# Patient Record
Sex: FEMALE | Race: BLACK OR AFRICAN AMERICAN | Employment: FULL TIME | URBAN - METROPOLITAN AREA
[De-identification: names, ages, dates, MRNs, and addresses within clinical notes are randomized per-mention and may not be internally consistent; named-entity substitution may affect disease eponyms.]

---

## 2018-12-11 ENCOUNTER — OFFICE VISIT (OUTPATIENT)
Dept: FAMILY MEDICINE CLINIC | Age: 53
End: 2018-12-11
Payer: COMMERCIAL

## 2018-12-11 VITALS
DIASTOLIC BLOOD PRESSURE: 82 MMHG | HEIGHT: 59 IN | SYSTOLIC BLOOD PRESSURE: 122 MMHG | HEART RATE: 87 BPM | RESPIRATION RATE: 16 BRPM | OXYGEN SATURATION: 98 % | TEMPERATURE: 98.1 F | BODY MASS INDEX: 43.85 KG/M2 | WEIGHT: 217.5 LBS

## 2018-12-11 DIAGNOSIS — Z23 NEED FOR TETANUS BOOSTER: ICD-10-CM

## 2018-12-11 DIAGNOSIS — Z23 NEED FOR INFLUENZA VACCINATION: ICD-10-CM

## 2018-12-11 DIAGNOSIS — Z00.00 WELL ADULT EXAM: Primary | ICD-10-CM

## 2018-12-11 DIAGNOSIS — Z12.39 SCREENING FOR BREAST CANCER: ICD-10-CM

## 2018-12-11 DIAGNOSIS — Z12.11 SCREEN FOR COLON CANCER: ICD-10-CM

## 2018-12-11 PROCEDURE — 90686 IIV4 VACC NO PRSV 0.5 ML IM: CPT | Performed by: FAMILY MEDICINE

## 2018-12-11 PROCEDURE — 90472 IMMUNIZATION ADMIN EACH ADD: CPT | Performed by: FAMILY MEDICINE

## 2018-12-11 PROCEDURE — 90471 IMMUNIZATION ADMIN: CPT | Performed by: FAMILY MEDICINE

## 2018-12-11 PROCEDURE — 99386 PREV VISIT NEW AGE 40-64: CPT | Performed by: FAMILY MEDICINE

## 2018-12-11 PROCEDURE — 90715 TDAP VACCINE 7 YRS/> IM: CPT | Performed by: FAMILY MEDICINE

## 2018-12-11 ASSESSMENT — PATIENT HEALTH QUESTIONNAIRE - PHQ9
2. FEELING DOWN, DEPRESSED OR HOPELESS: 0
SUM OF ALL RESPONSES TO PHQ QUESTIONS 1-9: 0
SUM OF ALL RESPONSES TO PHQ QUESTIONS 1-9: 0
1. LITTLE INTEREST OR PLEASURE IN DOING THINGS: 0
SUM OF ALL RESPONSES TO PHQ QUESTIONS 1-9: 0
SUM OF ALL RESPONSES TO PHQ9 QUESTIONS 1 & 2: 0
SUM OF ALL RESPONSES TO PHQ QUESTIONS 1-9: 0
2. FEELING DOWN, DEPRESSED OR HOPELESS: 0
1. LITTLE INTEREST OR PLEASURE IN DOING THINGS: 0
SUM OF ALL RESPONSES TO PHQ9 QUESTIONS 1 & 2: 0

## 2018-12-11 ASSESSMENT — ENCOUNTER SYMPTOMS
EYE ITCHING: 0
WHEEZING: 0
EYE REDNESS: 0
TROUBLE SWALLOWING: 0
NAUSEA: 0
CHEST TIGHTNESS: 0
VOMITING: 0
RHINORRHEA: 0
SHORTNESS OF BREATH: 0
ABDOMINAL PAIN: 0

## 2018-12-11 NOTE — PROGRESS NOTES
98.1 °F (36.7 °C), temperature source Tympanic, resp. rate 16, height 4' 11\" (1.499 m), weight 217 lb 8 oz (98.7 kg), last menstrual period 11/20/2018, SpO2 98 %, not currently breastfeeding. Physical Exam   Constitutional: She is oriented to person, place, and time. She appears well-developed and well-nourished. No distress. HENT:   Head: Normocephalic and atraumatic. Right Ear: External ear normal.   Left Ear: External ear normal.   Nose: Nose normal.   Mouth/Throat: Oropharynx is clear and moist. No oropharyngeal exudate. Eyes: Pupils are equal, round, and reactive to light. Conjunctivae and EOM are normal. Right eye exhibits no discharge. Left eye exhibits no discharge. No scleral icterus. Neck: Normal range of motion. Neck supple. No JVD present. No tracheal deviation present. No thyromegaly present. Cardiovascular: Normal rate, regular rhythm, normal heart sounds and intact distal pulses. Exam reveals no gallop and no friction rub. No murmur heard. Pulmonary/Chest: Effort normal and breath sounds normal. No respiratory distress. She has no wheezes. She has no rales. She exhibits no tenderness. Abdominal: Soft. Bowel sounds are normal. She exhibits no distension and no mass. There is no tenderness. There is no rebound and no guarding. Musculoskeletal: Normal range of motion. She exhibits no edema or tenderness. Lymphadenopathy:     She has no cervical adenopathy. Neurological: She is alert and oriented to person, place, and time. She has normal reflexes. No cranial nerve deficit. She exhibits normal muscle tone. Coordination normal.   Skin: Skin is warm. No rash noted. She is not diaphoretic. No erythema. No pallor. Psychiatric: She has a normal mood and affect. Her behavior is normal. Judgment and thought content normal.   Nursing note and vitals reviewed. Assessment:       Diagnosis Orders   1.  Well adult exam  Comprehensive Metabolic Panel    Lipid Panel    CBC Auto

## 2019-01-11 DIAGNOSIS — Z00.00 WELL ADULT EXAM: ICD-10-CM

## 2019-01-11 DIAGNOSIS — Z00.00 WELL ADULT EXAM: Primary | ICD-10-CM

## 2019-01-11 LAB
A/G RATIO: 1.2 (ref 1.1–2.2)
ALBUMIN SERPL-MCNC: 4.1 G/DL (ref 3.4–5)
ALP BLD-CCNC: 94 U/L (ref 40–129)
ALT SERPL-CCNC: 10 U/L (ref 10–40)
ANION GAP SERPL CALCULATED.3IONS-SCNC: 15 MMOL/L (ref 3–16)
AST SERPL-CCNC: 11 U/L (ref 15–37)
BASOPHILS ABSOLUTE: 0.1 K/UL (ref 0–0.2)
BASOPHILS RELATIVE PERCENT: 0.8 %
BILIRUB SERPL-MCNC: 0.3 MG/DL (ref 0–1)
BUN BLDV-MCNC: 10 MG/DL (ref 7–20)
CALCIUM SERPL-MCNC: 9 MG/DL (ref 8.3–10.6)
CHLORIDE BLD-SCNC: 102 MMOL/L (ref 99–110)
CHOLESTEROL, TOTAL: 158 MG/DL (ref 0–199)
CO2: 22 MMOL/L (ref 21–32)
CREAT SERPL-MCNC: <0.5 MG/DL (ref 0.6–1.1)
EOSINOPHILS ABSOLUTE: 0.1 K/UL (ref 0–0.6)
EOSINOPHILS RELATIVE PERCENT: 1.3 %
GFR AFRICAN AMERICAN: >60
GFR NON-AFRICAN AMERICAN: >60
GLOBULIN: 3.4 G/DL
GLUCOSE BLD-MCNC: 137 MG/DL (ref 70–99)
HCT VFR BLD CALC: 37.6 % (ref 36–48)
HDLC SERPL-MCNC: 53 MG/DL (ref 40–60)
HEMOGLOBIN: 11.8 G/DL (ref 12–16)
LDL CHOLESTEROL CALCULATED: 89 MG/DL
LYMPHOCYTES ABSOLUTE: 2.3 K/UL (ref 1–5.1)
LYMPHOCYTES RELATIVE PERCENT: 20.2 %
MCH RBC QN AUTO: 23.4 PG (ref 26–34)
MCHC RBC AUTO-ENTMCNC: 31.4 G/DL (ref 31–36)
MCV RBC AUTO: 74.6 FL (ref 80–100)
MONOCYTES ABSOLUTE: 0.4 K/UL (ref 0–1.3)
MONOCYTES RELATIVE PERCENT: 4 %
NEUTROPHILS ABSOLUTE: 8.3 K/UL (ref 1.7–7.7)
NEUTROPHILS RELATIVE PERCENT: 73.7 %
PDW BLD-RTO: 19.4 % (ref 12.4–15.4)
PLATELET # BLD: 285 K/UL (ref 135–450)
PMV BLD AUTO: 8.4 FL (ref 5–10.5)
POTASSIUM SERPL-SCNC: 4.3 MMOL/L (ref 3.5–5.1)
RBC # BLD: 5.05 M/UL (ref 4–5.2)
SODIUM BLD-SCNC: 139 MMOL/L (ref 136–145)
TOTAL PROTEIN: 7.5 G/DL (ref 6.4–8.2)
TRIGL SERPL-MCNC: 82 MG/DL (ref 0–150)
TSH SERPL DL<=0.05 MIU/L-ACNC: 1.41 UIU/ML (ref 0.27–4.2)
VLDLC SERPL CALC-MCNC: 16 MG/DL
WBC # BLD: 11.2 K/UL (ref 4–11)

## 2019-01-16 DIAGNOSIS — R73.09 ELEVATED GLUCOSE LEVEL: Primary | ICD-10-CM

## 2019-01-16 LAB — MISCELLANEOUS LAB TEST ORDER: NORMAL

## 2019-01-28 ENCOUNTER — OFFICE VISIT (OUTPATIENT)
Dept: FAMILY MEDICINE CLINIC | Age: 54
End: 2019-01-28
Payer: COMMERCIAL

## 2019-01-28 VITALS
RESPIRATION RATE: 16 BRPM | TEMPERATURE: 98.1 F | BODY MASS INDEX: 45.18 KG/M2 | SYSTOLIC BLOOD PRESSURE: 122 MMHG | OXYGEN SATURATION: 98 % | HEIGHT: 59 IN | WEIGHT: 224.1 LBS | DIASTOLIC BLOOD PRESSURE: 80 MMHG | HEART RATE: 86 BPM

## 2019-01-28 DIAGNOSIS — D58.2 HIGH BLOOD HEMOGLOBIN F (HCC): ICD-10-CM

## 2019-01-28 DIAGNOSIS — R73.9 HYPERGLYCEMIA: Primary | ICD-10-CM

## 2019-01-28 PROCEDURE — 99214 OFFICE O/P EST MOD 30 MIN: CPT | Performed by: FAMILY MEDICINE

## 2019-01-29 PROBLEM — D58.2 HIGH BLOOD HEMOGLOBIN F (HCC): Status: ACTIVE | Noted: 2019-01-29

## 2019-01-29 PROBLEM — R73.9 HYPERGLYCEMIA: Status: ACTIVE | Noted: 2019-01-29

## 2019-01-29 ASSESSMENT — ENCOUNTER SYMPTOMS
PHOTOPHOBIA: 0
SHORTNESS OF BREATH: 0
CHEST TIGHTNESS: 0
COLOR CHANGE: 0

## 2019-02-26 ENCOUNTER — OFFICE VISIT (OUTPATIENT)
Dept: FAMILY MEDICINE CLINIC | Age: 54
End: 2019-02-26
Payer: COMMERCIAL

## 2019-02-26 VITALS
TEMPERATURE: 98 F | OXYGEN SATURATION: 98 % | BODY MASS INDEX: 43.42 KG/M2 | WEIGHT: 215 LBS | HEART RATE: 92 BPM | DIASTOLIC BLOOD PRESSURE: 72 MMHG | SYSTOLIC BLOOD PRESSURE: 128 MMHG

## 2019-02-26 DIAGNOSIS — Z12.11 SCREEN FOR COLON CANCER: ICD-10-CM

## 2019-02-26 DIAGNOSIS — S16.1XXA STRAIN OF NECK MUSCLE, INITIAL ENCOUNTER: ICD-10-CM

## 2019-02-26 DIAGNOSIS — R05.9 COUGH: ICD-10-CM

## 2019-02-26 DIAGNOSIS — Z12.31 VISIT FOR SCREENING MAMMOGRAM: ICD-10-CM

## 2019-02-26 DIAGNOSIS — M54.6 ACUTE BILATERAL THORACIC BACK PAIN: Primary | ICD-10-CM

## 2019-02-26 DIAGNOSIS — R09.81 SINUS CONGESTION: ICD-10-CM

## 2019-02-26 PROCEDURE — 99214 OFFICE O/P EST MOD 30 MIN: CPT | Performed by: FAMILY MEDICINE

## 2019-02-26 RX ORDER — CYCLOBENZAPRINE HCL 10 MG
10 TABLET ORAL
COMMUNITY
Start: 2019-02-15 | End: 2020-06-02

## 2019-02-26 RX ORDER — FLUTICASONE PROPIONATE 50 MCG
2 SPRAY, SUSPENSION (ML) NASAL DAILY
Qty: 1 BOTTLE | Refills: 3 | Status: SHIPPED | OUTPATIENT
Start: 2019-02-26 | End: 2022-08-22

## 2019-02-26 ASSESSMENT — ENCOUNTER SYMPTOMS
HEARTBURN: 1
BOWEL INCONTINENCE: 0
SHORTNESS OF BREATH: 0
BACK PAIN: 1
ABDOMINAL PAIN: 0
WHEEZING: 0
COUGH: 1

## 2019-03-04 DIAGNOSIS — Z12.11 SCREENING FOR COLON CANCER: Primary | ICD-10-CM

## 2019-03-04 LAB
CONTROL: NORMAL
HEMOCCULT STL QL: POSITIVE

## 2019-03-04 PROCEDURE — 82274 ASSAY TEST FOR BLOOD FECAL: CPT | Performed by: FAMILY MEDICINE

## 2019-11-19 ENCOUNTER — OFFICE VISIT (OUTPATIENT)
Dept: FAMILY MEDICINE CLINIC | Age: 54
End: 2019-11-19

## 2019-11-19 VITALS
HEART RATE: 78 BPM | HEIGHT: 59 IN | RESPIRATION RATE: 16 BRPM | OXYGEN SATURATION: 98 % | BODY MASS INDEX: 42.58 KG/M2 | WEIGHT: 211.2 LBS | SYSTOLIC BLOOD PRESSURE: 118 MMHG | DIASTOLIC BLOOD PRESSURE: 72 MMHG | TEMPERATURE: 97.1 F

## 2019-11-19 DIAGNOSIS — Z23 NEED FOR INFLUENZA VACCINATION: ICD-10-CM

## 2019-11-19 DIAGNOSIS — M25.562 CHRONIC PAIN OF LEFT KNEE: Primary | ICD-10-CM

## 2019-11-19 DIAGNOSIS — G89.29 CHRONIC PAIN OF LEFT KNEE: Primary | ICD-10-CM

## 2019-11-19 PROCEDURE — 90686 IIV4 VACC NO PRSV 0.5 ML IM: CPT | Performed by: FAMILY MEDICINE

## 2019-11-19 PROCEDURE — 90471 IMMUNIZATION ADMIN: CPT | Performed by: FAMILY MEDICINE

## 2019-11-19 PROCEDURE — 99214 OFFICE O/P EST MOD 30 MIN: CPT | Performed by: FAMILY MEDICINE

## 2019-11-19 ASSESSMENT — PATIENT HEALTH QUESTIONNAIRE - PHQ9
SUM OF ALL RESPONSES TO PHQ QUESTIONS 1-9: 0
1. LITTLE INTEREST OR PLEASURE IN DOING THINGS: 0
SUM OF ALL RESPONSES TO PHQ QUESTIONS 1-9: 0
SUM OF ALL RESPONSES TO PHQ9 QUESTIONS 1 & 2: 0
2. FEELING DOWN, DEPRESSED OR HOPELESS: 0

## 2019-11-19 ASSESSMENT — ENCOUNTER SYMPTOMS: COLOR CHANGE: 0

## 2019-11-22 DIAGNOSIS — M25.562 CHRONIC PAIN OF LEFT KNEE: Primary | ICD-10-CM

## 2019-11-22 DIAGNOSIS — G89.29 CHRONIC PAIN OF LEFT KNEE: Primary | ICD-10-CM

## 2020-02-11 ENCOUNTER — OFFICE VISIT (OUTPATIENT)
Dept: ORTHOPEDIC SURGERY | Age: 55
End: 2020-02-11
Payer: COMMERCIAL

## 2020-02-11 VITALS
HEIGHT: 59 IN | SYSTOLIC BLOOD PRESSURE: 121 MMHG | HEART RATE: 74 BPM | BODY MASS INDEX: 42.94 KG/M2 | WEIGHT: 213 LBS | DIASTOLIC BLOOD PRESSURE: 60 MMHG

## 2020-02-11 PROCEDURE — 99204 OFFICE O/P NEW MOD 45 MIN: CPT | Performed by: PHYSICIAN ASSISTANT

## 2020-02-11 NOTE — PROGRESS NOTES
Patient Name: León Rene  : 1965  DOS: 2020        Chief Complaint:   Chief Complaint   Patient presents with    Knee Pain     Left knee       History of Present Illness:  León Rene is a 49 y.o. female who presents with a chief complaint of continued complaints of pain in the knee with irritation with walking, stairs and with overuse. Pain in the knee regularly with swelling and discomfort. Increase in pain over the past several years time. Patient notes she has been dealing with left knee pain over the past few years notes that she has been seeing a orthopedic at [facility] who is been giving her cortisone injections as well as Visco supplementation injections. She notes that the knee swells up approximately 3 to 4 weeks after receiving a corticosteroid injection and becomes very painful and will lock up causing her and inability to move his knee. She notes that she did not have this issue with the viscosupplementation injections when she did have them and they provided her 6 months worth of relief in regards to her pain. She notes she utilizes a compression brace to help with her knee on a daily basis and notes that she has limited to no issues when she utilizes her brace and when the knot/swelling is not there. However when the knot and swelling is there or does not use her knee brace the knee pain can get upwards of 10 out of 10 and has sent her to the emergency room a couple of times. If it injury fall trauma. Denies utilization of cane crutch or walker. Denies numbness burning tingling radiating pain down the leg. Does note on a day-to-day basis if she does not utilize her brace she will get a lot of pain especially with going up and down stairs as well as long periods of standing walking. Notes she is currently utilizing combination of Tylenol and ibuprofen to help manage her pain she was weaned off of pain medication which did provide some relief.           Medical History  Current Medications:   Prior to Admission medications    Medication Sig Start Date End Date Taking? Authorizing Provider   cyclobenzaprine (FLEXERIL) 10 MG tablet Take 10 mg by mouth 2/15/19   Historical Provider, MD   fluticasone [facility]) 50 MCG/ACT nasal spray 2 sprays by Nasal route daily 2/26/19 2/26/20  Carlitos Montez MD     Allergies: No Known Allergies    Review of Systems:     Review of Systems ______  Constitutional: Negative for fever and diaphoresis. ____________  Respiratory: Negative for shortness of breath.  ________  Gastrointestinal: Negative for abdominal pain. ________  Musculoskeletal: Positive for joint pain. ____  Skin: Negative for itching. ____  Neurological: Negative for loss of consciousness. ______________  All other systems reviewed and negative     No past medical history on file.   Family History   Problem Relation Age of Onset    Kidney Disease Mother     Heart Attack Father      Social History     Socioeconomic History    Marital status: Single     Spouse name: Not on file    Number of children: Not on file    Years of education: Not on file    Highest education level: Not on file   Occupational History    Not on file   Social Needs    Financial resource strain: Not on file    Food insecurity:     Worry: Not on file     Inability: Not on file    Transportation needs:     Medical: Not on file     Non-medical: Not on file   Tobacco Use    Smoking status: Never Smoker    Smokeless tobacco: Never Used   Substance and Sexual Activity    Alcohol use: No    Drug use: No    Sexual activity: Not on file   Lifestyle    Physical activity:     Days per week: Not on file     Minutes per session: Not on file    Stress: Not on file   Relationships    Social connections:     Talks on phone: Not on file     Gets together: Not on file     Attends [employer] service: Not on file     Active member of club or organization: Not on file     Attends meetings of clubs or organizations: Not on file     Relationship status: Not on file    Intimate partner violence:     Fear of current or ex partner: Not on file     Emotionally abused: Not on file     Physically abused: Not on file     Forced sexual activity: Not on file   Other Topics Concern    Not on file   Social History Narrative    Not on file         Physical Exam __  Constitutional: She is oriented to person, place, and time and well-developed, well-nourished, and in no distress. No distress. ____  HENT:   Head: Normocephalic and atraumatic. ____  Eyes: Conjunctivae are normal. ________  Cardiovascular: Intact distal pulses. ____  Pulmonary/Chest: Effort normal. ________________________  Neurological: She is alert and oriented to person, place, and time. ____  Skin: Skin is dry. She is not diaphoretic. ____  Psychiatric: Mood, affect and judgment normal. ______          Assessment   Vital Signs:      Vitals:    02/11/20 1338   BP: 121/60   Pulse: 74       left Knee shows minimal evidence for DJD with patellar obvious pseudolaxity, pain with weight bearing, antalgic gait and minimal palpable osteophytes. Inspection: Moderate anterior swelling. Swelling is present with mild effusion. The posterior aspect of the knee appears to be full with tenderness. There is no erythema, rash, or ecchymosis. Range of Motion:  Right 0 to 120 degrees left 0 to 120 degrees    No pain with varus or valgus testing    There is no noted deformity    Strength:  Hamstrings rated: 4/5. Quadriceps rated: 5/5    Palpation: There is moderate tenderness along the patellar greater lateral than medial joint line. Special Tests: Patellar Compression test is positive. Valgus & Varus test is positive. Skin: There are no rashes, ulcerations or lesions. Gait: Gait pattern is antalgic  Skin shows no rashes/ecchymosis to the affected area, no hyperesthesias, no discoloration, no temperature or color discrepancies.    NEUROLOGICALLY: There is no

## 2020-03-16 ENCOUNTER — HOSPITAL ENCOUNTER (OUTPATIENT)
Dept: MRI IMAGING | Age: 55
Discharge: HOME | End: 2020-03-16
Payer: COMMERCIAL

## 2020-03-16 PROCEDURE — 73721 MRI JNT OF LWR EXTRE W/O DYE: CPT

## 2020-06-02 ENCOUNTER — OFFICE VISIT (OUTPATIENT)
Dept: ORTHOPEDIC SURGERY | Age: 55
End: 2020-06-02
Payer: COMMERCIAL

## 2020-06-02 VITALS
DIASTOLIC BLOOD PRESSURE: 78 MMHG | BODY MASS INDEX: 45.95 KG/M2 | HEIGHT: 57 IN | TEMPERATURE: 98.1 F | WEIGHT: 212.96 LBS | HEART RATE: 82 BPM | SYSTOLIC BLOOD PRESSURE: 124 MMHG

## 2020-06-02 PROBLEM — G89.29 CHRONIC PAIN OF LEFT KNEE: Status: ACTIVE | Noted: 2020-06-02

## 2020-06-02 PROBLEM — M17.12 PRIMARY OSTEOARTHRITIS OF LEFT KNEE: Status: ACTIVE | Noted: 2020-06-02

## 2020-06-02 PROBLEM — M25.562 CHRONIC PAIN OF LEFT KNEE: Status: ACTIVE | Noted: 2020-06-02

## 2020-06-02 PROCEDURE — 99203 OFFICE O/P NEW LOW 30 MIN: CPT | Performed by: ORTHOPAEDIC SURGERY

## 2020-06-02 NOTE — PROGRESS NOTES
Heart Attack Father      Social History     Socioeconomic History    Marital status: Single     Spouse name: Not on file    Number of children: Not on file    Years of education: Not on file    Highest education level: Not on file   Occupational History    Not on file   Social Needs    Financial resource strain: Not on file    Food insecurity     Worry: Not on file     Inability: Not on file    Transportation needs     Medical: Not on file     Non-medical: Not on file   Tobacco Use    Smoking status: Never Smoker    Smokeless tobacco: Never Used   Substance and Sexual Activity    Alcohol use: No    Drug use: No    Sexual activity: Not on file   Lifestyle    Physical activity     Days per week: Not on file     Minutes per session: Not on file    Stress: Not on file   Relationships    Social connections     Talks on phone: Not on file     Gets together: Not on file     Attends Pent[employer]al service: Not on file     Active member of club or organization: Not on file     Attends meetings of clubs or organizations: Not on file     Relationship status: Not on file    Intimate partner violence     Fear of current or ex partner: Not on file     Emotionally abused: Not on file     Physically abused: Not on file     Forced sexual activity: Not on file   Other Topics Concern    Not on file   Social History Narrative    Not on file         Physical Exam __  Constitutional: She is oriented to person, place, and time and well-developed, well-nourished, and in no distress. No distress. ____  HENT:   Head: Normocephalic and atraumatic. ____  Eyes: Conjunctivae are normal. ________  Cardiovascular: Intact distal pulses. ____  Pulmonary/Chest: Effort normal. ________________________  Neurological: She is alert and oriented to person, place, and time. ____  Skin: Skin is dry.  She is not diaphoretic. ____  Psychiatric: Mood, affect and judgment normal. ______          Assessment   Vital Signs:      Vitals:    06/02/20 presence of patellofemoral osteoarthritis small meniscal tear is noted in the knee without evidence of major need for surgical intervention cartilage surfaces medial and laterally appear to be grossly intact other than defects and osteophytes medially which are mild    Assessment and Plan:       Diagnosis Orders   1. Chronic pain of left knee  Ambulatory referral to Physical Therapy    EUFLEXXA INJECTION (For Auth/Precert)   2. Primary osteoarthritis of left knee  Ambulatory referral to Physical Therapy    EUFLEXXA INJECTION (For Auth/Precert)              The orders below, if any, were placed during this visit:   Orders Placed This Encounter   Procedures    Ambulatory referral to Physical Therapy     Referral Priority:   Routine     Referral Type:   Eval and Treat     Requested Specialty:   Physical Therapy     Number of Visits Requested:   1    EUFLEXXA INJECTION (For Auth/Precert)     Standing Status:   Future     Standing Expiration Date:   6/2/2021              Treatment Plan: I discussed the diagnosis of arthritis with the patient. We've discussed treatment options which would include anti-inflammatory medication, physical therapy, bracing, cortisone injections, and Visco supplementation. We have also discussed the benefits of low impact exercise and weight loss. We have also discussed the possibility of joint replacement surgery in the future. 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     Physical therapy was recommended strongly to try to obtain better quad mechanics and additionally weight loss was emphasized significantly for her and the patellofemoral issues         Nella Bernstein MD

## 2020-06-04 ENCOUNTER — TELEPHONE (OUTPATIENT)
Dept: ORTHOPEDIC SURGERY | Age: 55
End: 2020-06-04

## 2020-06-24 ENCOUNTER — TELEPHONE (OUTPATIENT)
Dept: ORTHOPEDIC SURGERY | Age: 55
End: 2020-06-24

## 2020-06-30 ENCOUNTER — TELEPHONE (OUTPATIENT)
Dept: FAMILY MEDICINE CLINIC | Age: 55
End: 2020-06-30

## 2020-06-30 NOTE — TELEPHONE ENCOUNTER
[phone] (M)  OV: 11/19/2019    Patient is experiencing menopause and has an extreme itch in her private area. The itch has become painful and the patient has scratched to the point that the area is bleeding. Patient would like a medication to be prescribed or an appt to be scheduled. Please advise.

## 2020-07-06 NOTE — TELEPHONE ENCOUNTER
Patient is calling and can not make a 1:00 today would like to know what other day she can come in  Please advise  Marjorie Jones [phone]

## 2020-07-14 ENCOUNTER — OFFICE VISIT (OUTPATIENT)
Dept: ORTHOPEDIC SURGERY | Age: 55
End: 2020-07-14
Payer: COMMERCIAL

## 2020-07-14 VITALS
BODY MASS INDEX: 45.74 KG/M2 | HEART RATE: 81 BPM | SYSTOLIC BLOOD PRESSURE: 127 MMHG | WEIGHT: 212 LBS | DIASTOLIC BLOOD PRESSURE: 76 MMHG | HEIGHT: 57 IN | TEMPERATURE: 98.6 F

## 2020-07-14 PROCEDURE — 20610 DRAIN/INJ JOINT/BURSA W/O US: CPT | Performed by: PHYSICIAN ASSISTANT

## 2020-07-14 PROCEDURE — 99213 OFFICE O/P EST LOW 20 MIN: CPT | Performed by: PHYSICIAN ASSISTANT

## 2020-07-14 NOTE — PROGRESS NOTES
Patient Name: Christal Zhou  : 1965  DOS: 2020        Chief Complaint:   Chief Complaint   Patient presents with    Knee Pain     LT Alessandrafilemon Murillo #1       History of Present Illness:  Christal Zhou is a 48 y.o. female who presents with a chief complaint of continued complaints of pain in the knee with irritation with walking, stairs and with overuse. Pain in the knee regularly with swelling and discomfort. Currently: 2020  Patient is here for follow-up regarding left knee pain and osteoarthritis. She notes she is here for the initiation of the Euflexxa series. She states she is hopeful that this will provide better relief in regards to pain versus the cortisone injections. 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 Note she is utilizing a compression brace on the knee on a regular basis as it does help with reduction in pain and some stabilization. Notes most of the pain is with transitions, stairs and standing and walking for long periods of time. Rates her overall pain is a 7 at 8 out of 10. Denies numbness burning tingling radiating pains down the leg. Denies any falls trauma or injury. Is utilizing a cane to help with ambulation. Previously: 2020  Increase in pain over the past several months time. 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 Did increase in pain with work activities. Pain and slipping regularly. Did not do well with cortisone injections. She stated she had periods of time where she had to be hospitalized after the cortisone injections the knee would lock up and she would have severe amounts of pain. No gel injections were obtained            Medical History  Current Medications:   Prior to Admission medications    Medication Sig Start Date End Date Taking?  Authorizing Provider   Diclofenac Sodium POWD 2 g by Does not apply route 4 times daily Formula #3D Elizabeth 3% Baclo 2% Diclo 3%  Jai 6% Lido 2% Prilo 2% 6/2/20   Jah Quinones MD   diclofenac (VOLTAREN) 50 MG EC tablet Take 1 tablet by mouth 2 times daily (with meals) 6/2/20   Jah Quinones MD   fluticasone (FLONASE) 50 MCG/ACT nasal spray 2 sprays by Nasal route daily 2/26/19 2/26/20  Laura Raygoza MD     Allergies: No Known Allergies    Review of Systems:     Review of Systems ______  Constitutional: Negative for fever and diaphoresis. ____________  Respiratory: Negative for shortness of breath.  ________  Gastrointestinal: Negative for abdominal pain. ________  Musculoskeletal: Positive for joint pain. ____  Skin: Negative for itching. ____  Neurological: Negative for loss of consciousness. ______________  All other systems reviewed and negative     No past medical history on file.   Family History   Problem Relation Age of Onset    Kidney Disease Mother     Heart Attack Father      Social History     Socioeconomic History    Marital status: Single     Spouse name: Not on file    Number of children: Not on file    Years of education: Not on file    Highest education level: Not on file   Occupational History    Not on file   Social Needs    Financial resource strain: Not on file    Food insecurity     Worry: Not on file     Inability: Not on file    Transportation needs     Medical: Not on file     Non-medical: Not on file   Tobacco Use    Smoking status: Never Smoker    Smokeless tobacco: Never Used   Substance and Sexual Activity    Alcohol use: No    Drug use: No    Sexual activity: Not on file   Lifestyle    Physical activity     Days per week: Not on file     Minutes per session: Not on file    Stress: Not on file   Relationships    Social connections     Talks on phone: Not on file     Gets together: Not on file     Attends [employer] service: Not on file     Active member of club or organization: Not on file     Attends meetings of clubs or organizations: Not on file     Relationship status: Not on file    Intimate partner violence     Fear of current or ex partner: Not on file     Emotionally abused: Not on file     Physically abused: Not on file     Forced sexual activity: Not on file   Other Topics Concern    Not on file   Social History Narrative    Not on file         Physical Exam __  Constitutional: She is oriented to person, place, and time and well-developed, well-nourished, and in no distress. No distress. ____  HENT:   Head: Normocephalic and atraumatic. ____  Eyes: Conjunctivae are normal. ________  Cardiovascular: Intact distal pulses. ____  Pulmonary/Chest: Effort normal. ________________________  Neurological: She is alert and oriented to person, place, and time. ____  Skin: Skin is dry. She is not diaphoretic. ____  Psychiatric: Mood, affect and judgment normal. ______          Assessment   Vital Signs:      Vitals:    07/14/20 1150   BP: 127/76   Pulse: 81   Temp: 98.6 °F (37 °C)       left Knee shows evidence for DJD with medial minimal obvious pseudolaxity, pain with weight bearing, antalgic gait and palpable osteophytes. Give way of the knee and obvious antalgic gait noted    Inspection: Moderate anterior swelling. Swelling is present with mild effusion. The posterior aspect of the knee appears to be full with tenderness. There is no erythema, rash, or ecchymosis. Hypermobility of the patella is noted with obvious crepitus in this area    Range of Motion:  Right 0-120 left -5-110     Pain with varus valgus mild patellofemoral major to testing    There is no noted deformity swelling only    Strength:  Hamstrings rated: 4/5. Quadriceps rated: 4/5    Palpation: There is moderate tenderness along the medial and patellofemoral joint line. Special Tests: Patellar Compression test is positive. Valgus & Varus test is positive. Skin: There are no rashes, ulcerations or lesions.     Gait: Gait pattern is antalgic  Skin shows no rashes/ecchymosis to the affected area, no hyperesthesias, no discoloration, no temperature or color discrepancies. NEUROLOGICALLY: There is no evidence for sensory or motor deficits in the extremity. Coordination appears full with no spacticity or rigidity. Reflexes appear to be symmetric. Distal circulation intact. No signs of RSD. Additional Comments: Hip range of motion appears to be intact she does have significant issues with morbid obesity but is able to mobilize out of a sitting position standing and walking are very difficult but are improved markedly with use of a cane on the opposite side        Procedure(s):   Injection Procedure Note  Procedure Details     Verbal consent was obtained for the procedure. The left knee(s) was prepped with iodine and the skin was anesthetized. Using a 22 gauge needle the left knee(s) joint is injected with 2 ml Euflexxa viscosupplementation under the lateral aspect of the knee. The injection site was cleansed with topical isopropyl alcohol and a dressing was applied. Complications:  None; patient tolerated the procedure well. Diagnostic Test Findings:   Xray   Have reviewed the xrays above from 2/11/2020  and my impression is: AP lateral sunrise and PA flexion of the left knee performed on 2/11/2020 shows mild medial joint space narrowing and significant patellofemoral osteoarthritis. MRI evaluation performed on 3/16/2020 confirms the presence of patellofemoral osteoarthritis small meniscal tear is noted in the knee without evidence of major need for surgical intervention cartilage surfaces medial and laterally appear to be grossly intact other than defects and osteophytes medially which are mild    Assessment and Plan:       Diagnosis Orders   1.  Primary osteoarthritis of left knee  IA ARTHROCENTESIS ASPIR&/INJ MAJOR JT/BURSA W/O US    sodium hyaluronate (viscosup) injection 20 mg              The orders below, if any, were placed during this visit:   Orders Placed This Encounter   Procedures    [place] ARTHROCENTESIS ASPIR&/INJ MAJOR JT/BURSA W/O US              Treatment Plan: I discussed the diagnosis of arthritis with the patient. We've discussed treatment options which would include anti-inflammatory medication, physical therapy, bracing, cortisone injections, and Visco supplementation. We have also discussed the benefits of low impact exercise and weight loss. We have also discussed the possibility of joint replacement surgery in the future. -Initiation of viscosupplementation series was performed at today's visit given initial Euflexxa injection with no complaints or concerns arisen.  -Advised patient to rest ice and elevate the knee over the next 24 hours and to avoid heavy or high levels of activity and ice on a regular basis for the next 24 hours. -Advised patient to continue with physical therapy  -Recommended utilization of Cho-Pat knee strap brace  -Advised for follow-up in 1 week for repeat Euflexxa injection for the second injection of the three-part series. 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     Physical therapy was recommended strongly to try to obtain better quad mechanics and additionally weight loss was emphasized significantly for her and the patellofemoral issues         CATA Mallory

## 2020-07-21 ENCOUNTER — OFFICE VISIT (OUTPATIENT)
Dept: ORTHOPEDIC SURGERY | Age: 55
End: 2020-07-21
Payer: COMMERCIAL

## 2020-07-21 VITALS
TEMPERATURE: 98.1 F | HEART RATE: 79 BPM | HEIGHT: 57 IN | DIASTOLIC BLOOD PRESSURE: 71 MMHG | SYSTOLIC BLOOD PRESSURE: 109 MMHG | WEIGHT: 212 LBS | BODY MASS INDEX: 45.74 KG/M2

## 2020-07-21 PROCEDURE — 20610 DRAIN/INJ JOINT/BURSA W/O US: CPT | Performed by: PHYSICIAN ASSISTANT

## 2020-07-21 NOTE — PROGRESS NOTES
Patient Name: Ady Rutledge  : 1965  DOS: 2020        Chief Complaint:   Chief Complaint   Patient presents with    Knee Pain     Left knee-Euflexxa #2       History of Present Illness:  Ady Rutledge is a 48 y.o. female who presents with a chief complaint of continued complaints of pain in the knee with irritation with walking, stairs and with overuse. Pain in the knee regularly with swelling and discomfort. Currently: 2020  Patient is here for follow-up regarding left knee pain and osteoarthritis. She is also here for her second injection of the Euflexxa series she notes that she has had improvement in her knee pain overall rates it as a 2 out of 10 and is still continuing to perform within physical therapy. She is hopeful to return back to work on Monday of next week. She is still utilizing the brace to help with ambulation on the left knee and utilizes the cane intermittently. Previously: 2020  Patient is here for follow-up regarding left knee pain and osteoarthritis. She notes she is here for the initiation of the Euflexxa series. She states she is hopeful that this will provide better relief in regards to pain versus the cortisone injections. 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 Note she is utilizing a compression brace on the knee on a regular basis as it does help with reduction in pain and some stabilization. Notes most of the pain is with transitions, stairs and standing and walking for long periods of time. Rates her overall pain is a 7 at 8 out of 10. Denies numbness burning tingling radiating pains down the leg. Denies any falls trauma or injury. Is utilizing a cane to help with ambulation. Previously: 2020  Increase in pain over the past several months time. 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    Did increase in pain with work activities. Pain and slipping regularly. Did not do well with cortisone injections. She stated she had periods of time where she had to be hospitalized after the cortisone injections the knee would lock up and she would have severe amounts of pain. No gel injections were obtained            Medical History  Current Medications:   Prior to Admission medications    Medication Sig Start Date End Date Taking? Authorizing Provider   Diclofenac Sodium POWD 2 g by Does not apply route 4 times daily Formula #3D Elizabeth 3% Baclo 2% Diclo 3%  Jai 6% Lido 2% Prilo 2% 6/2/20   David Boyd MD   diclofenac (VOLTAREN) 50 MG EC tablet Take 1 tablet by mouth 2 times daily (with meals) 6/2/20   David Boyd MD   fluticasone (FLONASE) 50 MCG/ACT nasal spray 2 sprays by Nasal route daily 2/26/19 2/26/20  Catana Homans, MD     Allergies: No Known Allergies    Review of Systems:     Review of Systems ______  Constitutional: Negative for fever and diaphoresis. ____________  Respiratory: Negative for shortness of breath.  ________  Gastrointestinal: Negative for abdominal pain. ________  Musculoskeletal: Positive for joint pain. ____  Skin: Negative for itching. ____  Neurological: Negative for loss of consciousness. ______________  All other systems reviewed and negative     No past medical history on file.   Family History   Problem Relation Age of Onset    Kidney Disease Mother     Heart Attack Father      Social History     Socioeconomic History    Marital status: Single     Spouse name: Not on file    Number of children: Not on file    Years of education: Not on file    Highest education level: Not on file   Occupational History    Not on file   Social Needs    Financial resource strain: Not on file    Food insecurity     Worry: Not on file     Inability: Not on file    Transportation needs     Medical: Not on file     Non-medical: Not on file   Tobacco Use    Smoking status: Never Smoker    Smokeless Motion:  Right 0-120 left -5-110     Pain with varus valgus mild patellofemoral major to testing    There is no noted deformity swelling only    Strength:  Hamstrings rated: 4/5. Quadriceps rated: 4/5    Palpation: There is moderate tenderness along the medial and patellofemoral joint line. Special Tests: Patellar Compression test is positive. Valgus & Varus test is positive. Skin: There are no rashes, ulcerations or lesions. Gait: Gait pattern is antalgic  Skin shows no rashes/ecchymosis to the affected area, no hyperesthesias, no discoloration, no temperature or color discrepancies. NEUROLOGICALLY: There is no evidence for sensory or motor deficits in the extremity. Coordination appears full with no spacticity or rigidity. Reflexes appear to be symmetric. Distal circulation intact. No signs of RSD. Additional Comments: Hip range of motion appears to be intact she does have significant issues with morbid obesity but is able to mobilize out of a sitting position standing and walking are very difficult but are improved markedly with use of a cane on the opposite side        Procedure(s):   Injection Procedure Note  Procedure Details     Verbal consent was obtained for the procedure. The left knee(s) was prepped with iodine and the skin was anesthetized. Using a 22 gauge needle the left knee(s) joint is injected with 2 ml Euflexxa viscosupplementation under the lateral aspect of the knee. The injection site was cleansed with topical isopropyl alcohol and a dressing was applied. Complications:  None; patient tolerated the procedure well. Diagnostic Test Findings:   Xray   Have reviewed the xrays above from 2/11/2020  and my impression is: AP lateral sunrise and PA flexion of the left knee performed on 2/11/2020 shows mild medial joint space narrowing and significant patellofemoral osteoarthritis.   MRI evaluation performed on 3/16/2020 confirms the presence of patellofemoral osteoarthritis small meniscal tear is noted in the knee without evidence of major need for surgical intervention cartilage surfaces medial and laterally appear to be grossly intact other than defects and osteophytes medially which are mild    Assessment and Plan:       Diagnosis Orders   1. Primary osteoarthritis of left knee  [place] ARTHROCENTESIS ASPIR&/INJ MAJOR JT/BURSA W/O US    sodium hyaluronate (viscosup) injection 20 mg              The orders below, if any, were placed during this visit:   Orders Placed This Encounter   Procedures    [place] ARTHROCENTESIS ASPIR&/INJ MAJOR JT/BURSA W/O US              Treatment Plan: I discussed the diagnosis of arthritis with the patient. We've discussed treatment options which would include anti-inflammatory medication, physical therapy, bracing, cortisone injections, and Visco supplementation. We have also discussed the benefits of low impact exercise and weight loss. We have also discussed the possibility of joint replacement surgery in the future. -Second injection of of viscosupplementation series was performed at today's visit given second Euflexxa injection with no complaints or concerns arisen.  -Advised patient to rest ice and elevate the knee over the next 24 hours and to avoid heavy or high levels of activity and ice on a regular basis for the next 24 hours. -Advised patient to continue with physical therapy  -Recommended utilization of Cho-Pat knee strap brace  -Advised for follow-up in 1 week for repeat Euflexxa injection for the third injection of the three-part series. 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     Physical therapy was recommended strongly to try to obtain better quad mechanics and additionally weight loss was emphasized significantly for her and the patellofemoral issues Lubna Walters, PA

## 2020-07-28 ENCOUNTER — OFFICE VISIT (OUTPATIENT)
Dept: ORTHOPEDIC SURGERY | Age: 55
End: 2020-07-28
Payer: COMMERCIAL

## 2020-07-28 VITALS
DIASTOLIC BLOOD PRESSURE: 68 MMHG | TEMPERATURE: 99.9 F | BODY MASS INDEX: 45.74 KG/M2 | SYSTOLIC BLOOD PRESSURE: 114 MMHG | HEART RATE: 79 BPM | HEIGHT: 57 IN | WEIGHT: 212 LBS

## 2020-07-28 PROCEDURE — 20610 DRAIN/INJ JOINT/BURSA W/O US: CPT | Performed by: PHYSICIAN ASSISTANT

## 2020-07-28 NOTE — PROGRESS NOTES
in regards to pain versus the cortisone injections. 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 Note she is utilizing a compression brace on the knee on a regular basis as it does help with reduction in pain and some stabilization. Notes most of the pain is with transitions, stairs and standing and walking for long periods of time. Rates her overall pain is a 7 at 8 out of 10. Denies numbness burning tingling radiating pains down the leg. Denies any falls trauma or injury. Is utilizing a cane to help with ambulation. Previously: 6/2/2020  Increase in pain over the past several months time. 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 Did increase in pain with work activities. Pain and slipping regularly. Did not do well with cortisone injections. She stated she had periods of time where she had to be hospitalized after the cortisone injections the knee would lock up and she would have severe amounts of pain. No gel injections were obtained            Medical History  Current Medications:   Prior to Admission medications    Medication Sig Start Date End Date Taking? Authorizing Provider   Diclofenac Sodium POWD 2 g by Does not apply route 4 times daily Formula #3D Elizabeth 3% Baclo 2% Diclo 3%  Jai 6% Lido 2% Prilo 2% 6/2/20   Helen Ho MD   diclofenac (VOLTAREN) 50 MG EC tablet Take 1 tablet by mouth 2 times daily (with meals) 6/2/20   Helen Ho MD   fluticasone (FLONASE) 50 MCG/ACT nasal spray 2 sprays by Nasal route daily 2/26/19 2/26/20  Debra Zhang MD     Allergies: No Known Allergies    Review of Systems:     Review of Systems ______  Constitutional: Negative for fever and diaphoresis. ____________  Respiratory: Negative for shortness of breath.  ________  Gastrointestinal: Negative for abdominal pain. ________  Musculoskeletal: Positive for joint pain.  ____  Skin: Negative for itching. ____  Neurological: Negative for loss of consciousness. ______________  All other systems reviewed and negative     No past medical history on file. Family History   Problem Relation Age of Onset    Kidney Disease Mother     Heart Attack Father      Social History     Socioeconomic History    Marital status: Single     Spouse name: Not on file    Number of children: Not on file    Years of education: Not on file    Highest education level: Not on file   Occupational History    Not on file   Social Needs    Financial resource strain: Not on file    Food insecurity     Worry: Not on file     Inability: Not on file    Transportation needs     Medical: Not on file     Non-medical: Not on file   Tobacco Use    Smoking status: Never Smoker    Smokeless tobacco: Never Used   Substance and Sexual Activity    Alcohol use: No    Drug use: No    Sexual activity: Not on file   Lifestyle    Physical activity     Days per week: Not on file     Minutes per session: Not on file    Stress: Not on file   Relationships    Social connections     Talks on phone: Not on file     Gets together: Not on file     Attends Oriental orthodox service: Not on file     Active member of club or organization: Not on file     Attends meetings of clubs or organizations: Not on file     Relationship status: Not on file    Intimate partner violence     Fear of current or ex partner: Not on file     Emotionally abused: Not on file     Physically abused: Not on file     Forced sexual activity: Not on file   Other Topics Concern    Not on file   Social History Narrative    Not on file         Physical Exam __  Constitutional: She is oriented to person, place, and time and well-developed, well-nourished, and in no distress. No distress. ____  HENT:   Head: Normocephalic and atraumatic. ____  Eyes: Conjunctivae are normal. ________  Cardiovascular: Intact distal pulses.   ____  Pulmonary/Chest: Effort normal. Note  Procedure Details     Verbal consent was obtained for the procedure. The left knee(s) was prepped with iodine and the skin was anesthetized. Using a 22 gauge needle the left knee(s) joint is injected with 2 ml Euflexxa viscosupplementation under the lateral aspect of the knee. The injection site was cleansed with topical isopropyl alcohol and a dressing was applied. Complications:  None; patient tolerated the procedure well. Diagnostic Test Findings:   Xray   Have reviewed the xrays above from 2/11/2020  and my impression is: AP lateral sunrise and PA flexion of the left knee performed on 2/11/2020 shows mild medial joint space narrowing and significant patellofemoral osteoarthritis. MRI evaluation performed on 3/16/2020 confirms the presence of patellofemoral osteoarthritis small meniscal tear is noted in the knee without evidence of major need for surgical intervention cartilage surfaces medial and laterally appear to be grossly intact other than defects and osteophytes medially which are mild    Assessment and Plan:       Diagnosis Orders   1. Primary osteoarthritis of left knee  [place] ARTHROCENTESIS ASPIR&/INJ MAJOR JT/BURSA W/O US    sodium hyaluronate (viscosup) injection 20 mg              The orders below, if any, were placed during this visit:   Orders Placed This Encounter   Procedures    [place] ARTHROCENTESIS ASPIR&/INJ MAJOR JT/BURSA W/O US              Treatment Plan: I discussed the diagnosis of arthritis with the patient. We've discussed treatment options which would include anti-inflammatory medication, physical therapy, bracing, cortisone injections, and Visco supplementation. We have also discussed the benefits of low impact exercise and weight loss.   We have also discussed the possibility of joint replacement surgery in the future.  -Third and final injection of of viscosupplementation series was performed at today's visit given second Euflexxa injection with no complaints or concerns

## 2020-10-21 ENCOUNTER — TELEPHONE (OUTPATIENT)
Dept: FAMILY MEDICINE CLINIC | Age: 55
End: 2020-10-21

## 2020-10-21 ENCOUNTER — NURSE TRIAGE (OUTPATIENT)
Dept: OTHER | Facility: CLINIC | Age: 55
End: 2020-10-21

## 2020-10-21 NOTE — TELEPHONE ENCOUNTER
----- Message from Eden Patricio sent at 10/21/2020  1:42 PM EDT -----  Subject: Appointment Request    Reason for Call: Semi-Routine Return from RN Triage    QUESTIONS  Type of Appointment? Established Patient  Reason for appointment request? Available appointments did not meet   patient need  Additional Information for Provider? Patient fell on Monday   Needs to be seen within 3 days. Neck   shoulders and upper chest are sore.   ---------------------------------------------------------------------------  --------------  CALL BACK INFO  What is the best way for the office to contact you? OK to leave message on   voicemail  Preferred Call Back Phone Number? [phone]  ---------------------------------------------------------------------------  --------------  SCRIPT ANSWERS  Patient needs to be seen within 5 days? Yes  Nurse Name? Lucretia Menon  (Did RN indicate the need for Red Scheduling?)? No  Have you been diagnosed with COVID-19 (Coronavirus)   tested for COVID-19 (Coronavirus)   or told that you are suspected of having COVID-19 (Coronavirus)? No  Have you had a fever or taken medication to treat a fever within the past   3 days? No  Have you had a cough   shortness of breath or flu-like symptoms within the past 3 days? No  Do you currently have flu-like symptoms including fever or chills   cough   shortness of breath   or difficulty breathing   or new loss of taste or smell? No  (Service Expert  click yes below to proceed with [employer] As Usual   Scheduling)?  Yes

## 2020-10-21 NOTE — TELEPHONE ENCOUNTER
Reason for Disposition   Injury and pain has not improved after 3 days    Answer Assessment - Initial Assessment Questions  1. MECHANISM: \"How did the injury happen? \" (Consider the possibility of domestic violence or elder abuse)      Pt fell on Monday on her right arm    2. ONSET: \"When did the injury happen? \" (Minutes or hours ago)      Monday    3. LOCATION: \"What part of the neck is injured? \"      Back of her neck, radiates to shoulders and upper chest    4. SEVERITY: \"Can you move the neck normally? \"      Able to touch chin to chest and move it side to side normally    5. PAIN: \"Is there any pain? \" If so, ask: \"How bad is the pain? \"   (Scale 1-10; or mild, moderate, severe)      5-6/10    6. CORD SYMPTOMS: \"Any weakness or numbness of the arms or legs? \"      Pt reports she did at first, but denies currently. 7. SIZE: For cuts, bruises, or swelling, ask: \"How large is it? \" (e.g., inches or centimeters)       Denies    8. TETANUS: For any breaks in the skin, ask: \"When was the last tetanus booster? \"      No breaks in skin    9. OTHER SYMPTOMS: \"Do you have any other symptoms? \" (e.g., headache)     Pt reports she had a headache on Monday night, but does not currently. Ibuprofen has not provided much relief for her soreness. 10. PREGNANCY: \"Is there any chance you are pregnant? \" \"When was your last menstrual period? \"        Denies    Pt has arthritis and a torn meniscus which she wears a brace for. Pt reports she did bruise her spine 3-4 years ago and required PT    Protocols used: NECK INJURY-ADULT-[place]    Patient called pre-service center [facility]) [place] with red flag complaint. Brief description of triage: Pt calls in reporting that she fell on Monday and her neck, shoulders, and upper chest are sore.      Triage indicates for patient to be seen today in office    Care advice provided, patient verbalizes understanding; denies any other questions or concerns; instructed to call back for any new or worsening symptoms. Writer provided warm transfer to [place] Diallo at [facility] for appointment scheduling. Attention Provider: Thank you for allowing me to participate in the care of your patient. The patient was connected to triage in response to information provided to the [facility]. Please do not respond through this encounter as the response is not directed to a shared pool.

## 2020-12-22 ENCOUNTER — OFFICE VISIT (OUTPATIENT)
Dept: ORTHOPEDIC SURGERY | Age: 55
End: 2020-12-22
Payer: COMMERCIAL

## 2020-12-22 VITALS
BODY MASS INDEX: 45.74 KG/M2 | TEMPERATURE: 97.1 F | SYSTOLIC BLOOD PRESSURE: 107 MMHG | DIASTOLIC BLOOD PRESSURE: 67 MMHG | HEIGHT: 57 IN | HEART RATE: 93 BPM | WEIGHT: 212 LBS

## 2020-12-22 PROCEDURE — 99213 OFFICE O/P EST LOW 20 MIN: CPT | Performed by: ORTHOPAEDIC SURGERY

## 2021-01-04 ENCOUNTER — TELEPHONE (OUTPATIENT)
Dept: ORTHOPEDIC SURGERY | Age: 56
End: 2021-01-04

## 2021-01-05 ENCOUNTER — OFFICE VISIT (OUTPATIENT)
Dept: ORTHOPEDIC SURGERY | Age: 56
End: 2021-01-05
Payer: COMMERCIAL

## 2021-01-05 VITALS
DIASTOLIC BLOOD PRESSURE: 74 MMHG | WEIGHT: 212 LBS | SYSTOLIC BLOOD PRESSURE: 108 MMHG | HEART RATE: 94 BPM | BODY MASS INDEX: 45.74 KG/M2 | TEMPERATURE: 96.8 F | HEIGHT: 57 IN

## 2021-01-05 DIAGNOSIS — M17.12 PRIMARY OSTEOARTHRITIS OF LEFT KNEE: ICD-10-CM

## 2021-01-05 DIAGNOSIS — G89.29 CHRONIC PAIN OF LEFT KNEE: Primary | ICD-10-CM

## 2021-01-05 DIAGNOSIS — M25.562 CHRONIC PAIN OF LEFT KNEE: Primary | ICD-10-CM

## 2021-01-05 PROCEDURE — 99214 OFFICE O/P EST MOD 30 MIN: CPT | Performed by: PHYSICIAN ASSISTANT

## 2021-01-05 PROCEDURE — 20610 DRAIN/INJ JOINT/BURSA W/O US: CPT | Performed by: PHYSICIAN ASSISTANT

## 2021-01-05 RX ORDER — LIDOCAINE HYDROCHLORIDE 10 MG/ML
20 INJECTION, SOLUTION INFILTRATION; PERINEURAL ONCE
Status: COMPLETED | OUTPATIENT
Start: 2021-01-05 | End: 2021-01-05

## 2021-01-05 RX ORDER — TRIAMCINOLONE ACETONIDE 40 MG/ML
40 INJECTION, SUSPENSION INTRA-ARTICULAR; INTRAMUSCULAR ONCE
Status: COMPLETED | OUTPATIENT
Start: 2021-01-05 | End: 2021-01-05

## 2021-01-05 RX ORDER — BUPIVACAINE HYDROCHLORIDE 2.5 MG/ML
30 INJECTION, SOLUTION INFILTRATION; PERINEURAL ONCE
Status: COMPLETED | OUTPATIENT
Start: 2021-01-05 | End: 2021-01-05

## 2021-01-05 RX ADMIN — BUPIVACAINE HYDROCHLORIDE 75 MG: 2.5 INJECTION, SOLUTION INFILTRATION; PERINEURAL at 13:57

## 2021-01-05 RX ADMIN — TRIAMCINOLONE ACETONIDE 40 MG: 40 INJECTION, SUSPENSION INTRA-ARTICULAR; INTRAMUSCULAR at 13:58

## 2021-01-05 RX ADMIN — LIDOCAINE HYDROCHLORIDE 20 ML: 10 INJECTION, SOLUTION INFILTRATION; PERINEURAL at 13:58

## 2021-01-05 NOTE — LETTER
[place] [facility]  [address] [address]  Phone: [phone]  Fax: [address] [place], [place]        January 5, 2021     Patient: Demetris Cheek   YOB: 1965   Date of Visit: 1/5/2021       To Whom It May Concern: It is my medical opinion that Eber Tovar should remain out of work until 01/21/2021. If you have any questions or concerns, please don't hesitate to call.     Sincerely,      MD Lou Sanchez PA

## 2021-01-05 NOTE — LETTER
[place] [facility]  [address] [address]  Phone: [phone]  Fax: [phone], [address]        January 5, 2021     Patient: Adonay Borges   YOB: 1965   Date of Visit: 1/5/2021       To Whom It May Concern: It is my medical opinion that Barb Gallegos should remain out of work until 01/11/2021. If you have any questions or concerns, please don't hesitate to call.     Sincerely,      MD Gabriel Wilson PA

## 2021-01-05 NOTE — PROGRESS NOTES
Patient Name: Aleta Johnson  : 1965  DOS: 2021        Chief Complaint:   Chief Complaint   Patient presents with    Knee Pain     Left knee       History of Present Illness:  Aleta Johnson is a 48 y.o. female who presents with a chief complaint of continued complaints of pain in the knee with irritation with walking, stairs and with overuse. Pain in the knee regularly with swelling and discomfort. Currently: 2021  Patient is here for follow-up regarding left knee pain and osteoarthritis. She notes that the knee is locked up on her she is unable to fully extend the knee and has a lot of pain in the anterior aspect of the knee. Note she has been utilizing ice rest elevation is much as possible as well as muscle cramps and ibuprofen which provides some benefit. Rates her overall pain as a 9 out of 10. She denies any fall trauma or injury still utilizing cane to help with ambulation denies any numbness burning tingling radiating pains down the leg. She is hopeful to restart the gel injections if possible as they helped her for 6 months time and works very well in regards to controlling her overall pain until about 1 month ago. If not she would be helpful to pursue some form of cortisone injection if possible to help provide some relief. She is still utilizing a soft compression brace across the knee which does provide some relief. 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 Notes pain worsens with transitions, full extension, deep flexion stairs and/or bending/squatting or kneeling. Previously: 2020  Weston Shank twice at work with injury to the knees. Difficulty with getting up from sitting         2020  Patient is here for follow-up regarding left knee pain osteoarthritis she is here for third and final Euflexxa injection of the three-part series.   She notes she has had improvement in her overall pain notes that she did irritate the knee yesterday so her pain is a 5 out of 10 but notes overall she is doing a lot better with the series. She has gone back to work on Monday and has done well so far over the past 2 days. Denies any fall trauma or injury denies numbness burning tingling radiating pains down the leg. She does note that she has tried going without the brace but does note some hyperextension buckling that occurs randomly. She is still pursuing physical therapy and plans to pursue exercises on her own at home including utilization of a bike. Previously: 7/21/2020  Patient is here for follow-up regarding left knee pain and osteoarthritis. She is also here for her second injection of the Euflexxa series she notes that she has had improvement in her knee pain overall rates it as a 2 out of 10 and is still continuing to perform within physical therapy. She is hopeful to return back to work on Monday of next week. She is still utilizing the brace to help with ambulation on the left knee and utilizes the cane intermittently. Previously: 7/14/2020  Patient is here for follow-up regarding left knee pain and osteoarthritis. She notes she is here for the initiation of the Euflexxa series. She states she is hopeful that this will provide better relief in regards to pain versus the cortisone injections. 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 Note she is utilizing a compression brace on the knee on a regular basis as it does help with reduction in pain and some stabilization. Notes most of the pain is with transitions, stairs and standing and walking for long periods of time. Rates her overall pain is a 7 at 8 out of 10. Denies numbness burning tingling radiating pains down the leg. Denies any falls trauma or injury. Is utilizing a cane to help with ambulation.     Previously: 6/2/2020  Increase in pain over the past several months time. 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 Did increase in pain with work activities. Pain and slipping regularly. Did not do well with cortisone injections. She stated she had periods of time where she had to be hospitalized after the cortisone injections the knee would lock up and she would have severe amounts of pain. No gel injections were obtained            Medical History  Current Medications:   Prior to Admission medications    Medication Sig Start Date End Date Taking? Authorizing Provider   Diclofenac Sodium POWD 2 g by Does not apply route 4 times daily Formula #3D Elizabeth 3% Baclo 2% Diclo 3%  Jai 6% Lido 2% Prilo 2% 6/2/20   Kelsie Joe MD   diclofenac (VOLTAREN) 50 MG EC tablet Take 1 tablet by mouth 2 times daily (with meals) 6/2/20   Kelsie Joe MD   fluticasone (FLONASE) 50 MCG/ACT nasal spray 2 sprays by Nasal route daily 2/26/19 2/26/20  Josephine Brennan MD     Allergies: No Known Allergies    Review of Systems:     Review of Systems ______  Constitutional: Negative for fever and diaphoresis. ____________  Respiratory: Negative for shortness of breath.  ________  Gastrointestinal: Negative for abdominal pain. ________  Musculoskeletal: Positive for joint pain. ____  Skin: Negative for itching. ____  Neurological: Negative for loss of consciousness. ______________  All other systems reviewed and negative     No past medical history on file.   Family History   Problem Relation Age of Onset    Kidney Disease Mother     Heart Attack Father      Social History     Socioeconomic History    Marital status: Single     Spouse name: Not on file    Number of children: Not on file    Years of education: Not on file    Highest education level: Not on file   Occupational History    Not on file   Social Needs    Financial resource strain: Not on file    Food insecurity     Worry: Not on file     Inability: Not on file    Transportation needs     Medical: Not on file Non-medical: Not on file   Tobacco Use    Smoking status: Never Smoker    Smokeless tobacco: Never Used   Substance and Sexual Activity    Alcohol use: No    Drug use: No    Sexual activity: Not on file   Lifestyle    Physical activity     Days per week: Not on file     Minutes per session: Not on file    Stress: Not on file   Relationships    Social connections     Talks on phone: Not on file     Gets together: Not on file     Attends Religion service: Not on file     Active member of club or organization: Not on file     Attends meetings of clubs or organizations: Not on file     Relationship status: Not on file    Intimate partner violence     Fear of current or ex partner: Not on file     Emotionally abused: Not on file     Physically abused: Not on file     Forced sexual activity: Not on file   Other Topics Concern    Not on file   Social History Narrative    Not on file         Physical Exam __  Constitutional: She is oriented to person, place, and time and well-developed, well-nourished, and in no distress. No distress. ____  HENT:   Head: Normocephalic and atraumatic. ____  Eyes: Conjunctivae are normal. ________  Cardiovascular: Intact distal pulses. ____  Pulmonary/Chest: Effort normal. ________________________  Neurological: She is alert and oriented to person, place, and time. ____  Skin: Skin is dry. She is not diaphoretic. ____  Psychiatric: Mood, affect and judgment normal. ______          Assessment   Vital Signs:      Vitals:    01/05/21 1050   BP: 108/74   Pulse: 94   Temp: 96.8 °F (36 °C)       left Knee shows evidence for DJD with medial minimal obvious pseudolaxity, pain with weight bearing, antalgic gait and palpable osteophytes. Give way of the knee and obvious antalgic gait noted    Inspection: Moderate anterior swelling. Swelling is present with mild effusion. The posterior aspect of the knee appears to be full with tenderness. There is no erythema, rash, or ecchymosis. Hypermobility of the patella is noted with obvious crepitus in this area    Range of Motion:  Right 0-120 left -5-110     Pain with varus valgus mild patellofemoral major to testing    There is no noted deformity swelling only    Strength:  Hamstrings rated: 4/5. Quadriceps rated: 4/5    Palpation: There is moderate tenderness along the medial and patellofemoral joint line. Special Tests: Patellar Compression test is positive. Valgus & Varus test is positive. Skin: There are no rashes, ulcerations or lesions. Gait: Gait pattern is antalgic  Skin shows no rashes/ecchymosis to the affected area, no hyperesthesias, no discoloration, no temperature or color discrepancies. NEUROLOGICALLY: There is no evidence for sensory or motor deficits in the extremity. Coordination appears full with no spacticity or rigidity. Reflexes appear to be symmetric. Distal circulation intact. No signs of RSD. Additional Comments: Hip range of motion appears to be intact she does have significant issues with morbid obesity but is able to mobilize out of a sitting position standing and walking are very difficult but are improved markedly with use of a cane on the opposite side             Diagnostic Test Findings:   Xray   Have reviewed the xrays above from 2/11/2020  and my impression is: AP lateral sunrise and PA flexion of the left knee performed on 2/11/2020 shows mild medial joint space narrowing and significant patellofemoral osteoarthritis. MRI evaluation performed on 3/16/2020 confirms the presence of patellofemoral osteoarthritis small meniscal tear is noted in the knee without evidence of major need for surgical intervention cartilage surfaces medial and laterally appear to be grossly intact other than defects and osteophytes medially which are mild    Assessment and Plan:       Diagnosis Orders   1.  Chronic pain of left knee  [place] ARTHROCENTESIS ASPIR&/INJ MAJOR JT/BURSA W/O US    triamcinolone acetonide

## 2021-01-07 ENCOUNTER — TELEPHONE (OUTPATIENT)
Dept: ORTHOPEDIC SURGERY | Age: 56
End: 2021-01-07

## 2021-01-07 DIAGNOSIS — M17.12 PRIMARY OSTEOARTHRITIS OF LEFT KNEE: Primary | ICD-10-CM

## 2021-01-11 ENCOUNTER — TELEPHONE (OUTPATIENT)
Dept: ORTHOPEDIC SURGERY | Age: 56
End: 2021-01-11

## 2021-01-18 ENCOUNTER — TELEPHONE (OUTPATIENT)
Dept: ORTHOPEDIC SURGERY | Age: 56
End: 2021-01-18

## 2021-01-18 NOTE — TELEPHONE ENCOUNTER
01/18/2021  EUFLEXXA  (SERIES OF 3)  LEFT   KNEE. APPROVED #  [id number]. DATES: 01/28/2021 - 03/28/2021. PER FAX FROM [facility].   AP

## 2021-01-27 ENCOUNTER — OFFICE VISIT (OUTPATIENT)
Dept: ORTHOPEDIC SURGERY | Age: 56
End: 2021-01-27
Payer: COMMERCIAL

## 2021-01-27 VITALS
WEIGHT: 212 LBS | HEIGHT: 57 IN | HEART RATE: 83 BPM | BODY MASS INDEX: 45.74 KG/M2 | SYSTOLIC BLOOD PRESSURE: 121 MMHG | DIASTOLIC BLOOD PRESSURE: 73 MMHG | TEMPERATURE: 96.8 F

## 2021-01-27 DIAGNOSIS — M17.12 PRIMARY OSTEOARTHRITIS OF LEFT KNEE: Primary | ICD-10-CM

## 2021-01-27 PROCEDURE — 99213 OFFICE O/P EST LOW 20 MIN: CPT | Performed by: PHYSICIAN ASSISTANT

## 2021-01-27 PROCEDURE — 20610 DRAIN/INJ JOINT/BURSA W/O US: CPT | Performed by: PHYSICIAN ASSISTANT

## 2021-01-27 RX ORDER — HYALURONATE SODIUM 10 MG/ML
20 SYRINGE (ML) INTRAARTICULAR ONCE
Status: COMPLETED | OUTPATIENT
Start: 2021-01-27 | End: 2021-01-27

## 2021-01-27 RX ADMIN — Medication 20 MG: at 16:08

## 2021-01-27 NOTE — PROGRESS NOTES
Patient Name: Juan F Cameron  : 1965  DOS: 2021        Chief Complaint:   Chief Complaint   Patient presents with    Knee Pain     LT Ximena Bowen #1       History of Present Illness:  Juan F Cameron is a 48 y.o. female who presents with a chief complaint of continued complaints of pain in the knee with irritation with walking, stairs and with overuse. Pain in the knee regularly with swelling and discomfort. Currently: 2021  Patient is here for follow-up regarding left knee pain and osteoarthritis. She notes that the cortisone injection that was performed on 2021 provided little to no relief. She notes that helped for about 1 week and then faded off. She does note that the locking pain that she was having has reduced a little bit. She is still utilizing a cane to help with ambulation. Denies numbness burning tingling radiating pains in the leg. Denies any fall trauma or injury. Rates her overall pain is. 10. She does sit the Euflexxa injections helped her significantly lasted 5 almost 6 months with utilization last time so she is hopeful for the same kind of improvement with this series. Previously: 2021  Patient is here for follow-up regarding left knee pain and osteoarthritis. She notes that the knee is locked up on her she is unable to fully extend the knee and has a lot of pain in the anterior aspect of the knee. Note she has been utilizing ice rest elevation is much as possible as well as muscle cramps and ibuprofen which provides some benefit. Rates her overall pain as a 9 out of 10. She denies any fall trauma or injury still utilizing cane to help with ambulation denies any numbness burning tingling radiating pains down the leg. She is hopeful to restart the gel injections if possible as they helped her for 6 months time and works very well in regards to controlling her overall pain until about 1 month ago.   If not she would be helpful to pursue some form of cortisone injection if possible to help provide some relief. She is still utilizing a soft compression brace across the knee which does provide some relief. 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 Notes pain worsens with transitions, full extension, deep flexion stairs and/or bending/squatting or kneeling. Previously: 12/22/2020  Merly Cunningham twice at work with injury to the knees. Difficulty with getting up from sitting         7/28/2020  Patient is here for follow-up regarding left knee pain osteoarthritis she is here for third and final Euflexxa injection of the three-part series. She notes she has had improvement in her overall pain notes that she did irritate the knee yesterday so her pain is a 5 out of 10 but notes overall she is doing a lot better with the series. She has gone back to work on Monday and has done well so far over the past 2 days. Denies any fall trauma or injury denies numbness burning tingling radiating pains down the leg. She does note that she has tried going without the brace but does note some hyperextension buckling that occurs randomly. She is still pursuing physical therapy and plans to pursue exercises on her own at home including utilization of a bike. Previously: 7/21/2020  Patient is here for follow-up regarding left knee pain and osteoarthritis. She is also here for her second injection of the Euflexxa series she notes that she has had improvement in her knee pain overall rates it as a 2 out of 10 and is still continuing to perform within physical therapy. She is hopeful to return back to work on Monday of next week. She is still utilizing the brace to help with ambulation on the left knee and utilizes the cane intermittently. Previously: 7/14/2020  Patient is here for follow-up regarding left knee pain and osteoarthritis. She notes she is here for the initiation of the Euflexxa series.   She states she is hopeful that this will provide better relief in regards to pain versus the cortisone injections. 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 Note she is utilizing a compression brace on the knee on a regular basis as it does help with reduction in pain and some stabilization. Notes most of the pain is with transitions, stairs and standing and walking for long periods of time. Rates her overall pain is a 7 at 8 out of 10. Denies numbness burning tingling radiating pains down the leg. Denies any falls trauma or injury. Is utilizing a cane to help with ambulation. Previously: 6/2/2020  Increase in pain over the past several months time. 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 Did increase in pain with work activities. Pain and slipping regularly. Did not do well with cortisone injections. She stated she had periods of time where she had to be hospitalized after the cortisone injections the knee would lock up and she would have severe amounts of pain. No gel injections were obtained            Medical History  Current Medications:   Prior to Admission medications    Medication Sig Start Date End Date Taking? Authorizing Provider   Diclofenac Sodium POWD 2 g by Does not apply route 4 times daily Formula #3D Elizabeth 3% Baclo 2% Diclo 3%  Jai 6% Lido 2% Prilo 2% 6/2/20   Saranya Isaac MD   diclofenac (VOLTAREN) 50 MG EC tablet Take 1 tablet by mouth 2 times daily (with meals) 6/2/20   Saranya Isaac MD   fluticasone (FLONASE) 50 MCG/ACT nasal spray 2 sprays by Nasal route daily 2/26/19 2/26/20  Lina Santiago MD     Allergies: No Known Allergies    Review of Systems:     Review of Systems ______  Constitutional: Negative for fever and diaphoresis.  ____________  Respiratory: Negative for shortness of breath.  ________  Gastrointestinal: Negative for abdominal pain. ________  Musculoskeletal: Positive for joint pain. ____  Skin: Negative for itching. ____  Neurological: Negative for loss of consciousness. ______________  All other systems reviewed and negative     No past medical history on file. Family History   Problem Relation Age of Onset    Kidney Disease Mother     Heart Attack Father      Social History     Socioeconomic History    Marital status: Single     Spouse name: Not on file    Number of children: Not on file    Years of education: Not on file    Highest education level: Not on file   Occupational History    Not on file   Social Needs    Financial resource strain: Not on file    Food insecurity     Worry: Not on file     Inability: Not on file    Transportation needs     Medical: Not on file     Non-medical: Not on file   Tobacco Use    Smoking status: Never Smoker    Smokeless tobacco: Never Used   Substance and Sexual Activity    Alcohol use: No    Drug use: No    Sexual activity: Not on file   Lifestyle    Physical activity     Days per week: Not on file     Minutes per session: Not on file    Stress: Not on file   Relationships    Social connections     Talks on phone: Not on file     Gets together: Not on file     Attends Jehovah's witness service: Not on file     Active member of club or organization: Not on file     Attends meetings of clubs or organizations: Not on file     Relationship status: Not on file    Intimate partner violence     Fear of current or ex partner: Not on file     Emotionally abused: Not on file     Physically abused: Not on file     Forced sexual activity: Not on file   Other Topics Concern    Not on file   Social History Narrative    Not on file         Physical Exam __  Constitutional: She is oriented to person, place, and time and well-developed, well-nourished, and in no distress. No distress. ____  HENT:   Head: Normocephalic and atraumatic. ____  Eyes: Conjunctivae are normal. ________  Cardiovascular: Intact distal pulses.   ____  Pulmonary/Chest: Effort normal. ________________________  Neurological: She is alert and oriented to person, place, and time. ____  Skin: Skin is dry. She is not diaphoretic. ____  Psychiatric: Mood, affect and judgment normal. ______          Assessment   Vital Signs:      Vitals:    01/27/21 1407   BP: 121/73   Pulse: 83   Temp: 96.8 °F (36 °C)       left Knee shows evidence for DJD with medial minimal obvious pseudolaxity, pain with weight bearing, antalgic gait and palpable osteophytes. Give way of the knee and obvious antalgic gait noted    Inspection: Moderate anterior swelling. Swelling is present with mild effusion. The posterior aspect of the knee appears to be full with tenderness. There is no erythema, rash, or ecchymosis. Hypermobility of the patella is noted with obvious crepitus in this area    Range of Motion:  Right 0-120 left -5-110     Pain with varus valgus mild patellofemoral major to testing    There is no noted deformity swelling only    Strength:  Hamstrings rated: 4/5. Quadriceps rated: 4/5    Palpation: There is moderate tenderness along the medial and patellofemoral joint line. Special Tests: Patellar Compression test is positive. Valgus & Varus test is positive. Skin: There are no rashes, ulcerations or lesions. Gait: Gait pattern is antalgic  Skin shows no rashes/ecchymosis to the affected area, no hyperesthesias, no discoloration, no temperature or color discrepancies. NEUROLOGICALLY: There is no evidence for sensory or motor deficits in the extremity. Coordination appears full with no spacticity or rigidity. Reflexes appear to be symmetric. Distal circulation intact. No signs of RSD.        Additional Comments: Hip range of motion appears to be intact she does have significant issues with morbid obesity but is able to mobilize out of a sitting position standing and walking are very difficult but are improved markedly with use of a cane on the opposite side             Diagnostic Test Findings:   Xray   Have reviewed the xrays above from 2/11/2020  and my impression is: AP lateral sunrise and PA flexion of the left knee performed on 2/11/2020 shows mild medial joint space narrowing and significant patellofemoral osteoarthritis. MRI evaluation performed on 3/16/2020 confirms the presence of patellofemoral osteoarthritis small meniscal tear is noted in the knee without evidence of major need for surgical intervention cartilage surfaces medial and laterally appear to be grossly intact other than defects and osteophytes medially which are mild    Assessment and Plan:       Diagnosis Orders   1. Primary osteoarthritis of left knee  [place] ARTHROCENTESIS ASPIR&/INJ MAJOR JT/BURSA W/O US    sodium hyaluronate (viscosup) injection 20 mg              The orders below, if any, were placed during this visit:   Orders Placed This Encounter   Procedures    [place] ARTHROCENTESIS ASPIR&/INJ MAJOR JT/BURSA W/O US              Treatment Plan: I discussed the diagnosis of arthritis with the patient. We've discussed treatment options which would include anti-inflammatory medication, physical therapy, bracing, cortisone injections, and Visco supplementation. We have also discussed the benefits of low impact exercise and weight loss. We have also discussed the possibility of joint replacement surgery in the future. -Advised patient to continue with physical therapy  -Recommended utilization of Cho-Pat knee strap brace  -Performed viscosupplementation injection of Euflexxa at today's visit to the left knee at today's visit no complaints or concerns arisen. .  -Advised to rest ice and elevate the knee for the next 24 hours and then can resume normal activities as tolerated. -Advised to follow-up in 1 week for second injection of the 3 part series. Injection Procedure Note  Procedure Details     Verbal consent was obtained for the procedure. The left knee(s) was prepped with iodine and the skin was anesthetized.

## 2021-02-01 ENCOUNTER — TELEPHONE (OUTPATIENT)
Dept: FAMILY MEDICINE CLINIC | Age: 56
End: 2021-02-01

## 2021-02-02 ENCOUNTER — OFFICE VISIT (OUTPATIENT)
Dept: ORTHOPEDIC SURGERY | Age: 56
End: 2021-02-02
Payer: COMMERCIAL

## 2021-02-02 VITALS
DIASTOLIC BLOOD PRESSURE: 73 MMHG | WEIGHT: 212 LBS | SYSTOLIC BLOOD PRESSURE: 123 MMHG | HEART RATE: 81 BPM | BODY MASS INDEX: 45.74 KG/M2 | HEIGHT: 57 IN | TEMPERATURE: 97.2 F

## 2021-02-02 DIAGNOSIS — M17.12 PRIMARY OSTEOARTHRITIS OF LEFT KNEE: Primary | ICD-10-CM

## 2021-02-02 PROCEDURE — 99024 POSTOP FOLLOW-UP VISIT: CPT | Performed by: PHYSICIAN ASSISTANT

## 2021-02-02 PROCEDURE — 20610 DRAIN/INJ JOINT/BURSA W/O US: CPT | Performed by: PHYSICIAN ASSISTANT

## 2021-02-02 RX ORDER — HYALURONATE SODIUM 10 MG/ML
20 SYRINGE (ML) INTRAARTICULAR ONCE
Status: COMPLETED | OUTPATIENT
Start: 2021-02-02 | End: 2021-02-02

## 2021-02-02 RX ADMIN — Medication 20 MG: at 13:41

## 2021-02-02 NOTE — PROGRESS NOTES
Patient Name: Zabrina Arango  : 1965  DOS: 2021        Chief Complaint:   Chief Complaint   Patient presents with    Knee Pain     LT [facility] #2       History of Present Illness:  Zabrina Arango is a 48 y.o. female who presents with a chief complaint of continued complaints of pain in the knee with irritation with walking, stairs and with overuse. Pain in the knee regularly with swelling and discomfort. Currently: 2021  Patient is here for follow-up regarding left knee pain and osteoarthritis. She is here for the second Euflexxa injection of the 3 part series she notes improvement in regards to her pain. States she has been able to mobilize better and get around a lot better because of it. Rates her pain is a 2 out of 10. She is hopeful for continued improvement with finishing up the injection. Denies any fall trauma or injury denies numbness burning tingling radiating pains down the leg. Utilizing a cane and brace and the need help with ambulation. 2021  Patient is here for follow-up regarding left knee pain and osteoarthritis. She notes that the cortisone injection that was performed on 2021 provided little to no relief. She notes that helped for about 1 week and then faded off. She does note that the locking pain that she was having has reduced a little bit. She is still utilizing a cane to help with ambulation. Denies numbness burning tingling radiating pains in the leg. Denies any fall trauma or injury. Rates her overall pain is. 10. She does sit the Euflexxa injections helped her significantly lasted 5 almost 6 months with utilization last time so she is hopeful for the same kind of improvement with this series. Previously: 2021  Patient is here for follow-up regarding left knee pain and osteoarthritis.   She notes that the knee is locked up on her she is unable to fully extend the knee and has a lot of pain in the anterior aspect of the knee.  Note she has been utilizing ice rest elevation is much as possible as well as muscle cramps and ibuprofen which provides some benefit. Rates her overall pain as a 9 out of 10. She denies any fall trauma or injury still utilizing cane to help with ambulation denies any numbness burning tingling radiating pains down the leg. She is hopeful to restart the gel injections if possible as they helped her for 6 months time and works very well in regards to controlling her overall pain until about 1 month ago. If not she would be helpful to pursue some form of cortisone injection if possible to help provide some relief. She is still utilizing a soft compression brace across the knee which does provide some relief. 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 Notes pain worsens with transitions, full extension, deep flexion stairs and/or bending/squatting or kneeling. Previously: 12/22/2020  Mara Channel twice at work with injury to the knees. Difficulty with getting up from sitting         7/28/2020  Patient is here for follow-up regarding left knee pain osteoarthritis she is here for third and final Euflexxa injection of the three-part series. She notes she has had improvement in her overall pain notes that she did irritate the knee yesterday so her pain is a 5 out of 10 but notes overall she is doing a lot better with the series. She has gone back to work on Monday and has done well so far over the past 2 days. Denies any fall trauma or injury denies numbness burning tingling radiating pains down the leg. She does note that she has tried going without the brace but does note some hyperextension buckling that occurs randomly. She is still pursuing physical therapy and plans to pursue exercises on her own at home including utilization of a bike. Previously: 7/21/2020  Patient is here for follow-up regarding left knee pain and osteoarthritis.   She is also here for her second injection of Ghada Luis MD   diclofenac (VOLTAREN) 50 MG EC tablet Take 1 tablet by mouth 2 times daily (with meals) 6/2/20   Lina Santiago MD   fluticasone [facility]) 50 MCG/ACT nasal spray 2 sprays by Nasal route daily 2/26/19 2/26/20  Delphia Burkitt, MD     Allergies: No Known Allergies    Review of Systems:     Review of Systems ______  Constitutional: Negative for fever and diaphoresis. ____________  Respiratory: Negative for shortness of breath.  ________  Gastrointestinal: Negative for abdominal pain. ________  Musculoskeletal: Positive for joint pain. ____  Skin: Negative for itching. ____  Neurological: Negative for loss of consciousness. ______________  All other systems reviewed and negative     No past medical history on file.   Family History   Problem Relation Age of Onset    Kidney Disease Mother     Heart Attack Father      Social History     Socioeconomic History    Marital status: Single     Spouse name: Not on file    Number of children: Not on file    Years of education: Not on file    Highest education level: Not on file   Occupational History    Not on file   Social Needs    Financial resource strain: Not on file    Food insecurity     Worry: Not on file     Inability: Not on file    Transportation needs     Medical: Not on file     Non-medical: Not on file   Tobacco Use    Smoking status: Never Smoker    Smokeless tobacco: Never Used   Substance and Sexual Activity    Alcohol use: No    Drug use: No    Sexual activity: Not on file   Lifestyle    Physical activity     Days per week: Not on file     Minutes per session: Not on file    Stress: Not on file   Relationships    Social connections     Talks on phone: Not on file     Gets together: Not on file     Attends Hinduism service: Not on file     Active member of club or organization: Not on file     Attends meetings of clubs or organizations: Not on file     Relationship status: Not on file    Intimate partner violence     Fear of current or ex partner: Not on file     Emotionally abused: Not on file     Physically abused: Not on file     Forced sexual activity: Not on file   Other Topics Concern    Not on file   Social History Narrative    Not on file         Physical Exam __  Constitutional: She is oriented to person, place, and time and well-developed, well-nourished, and in no distress. No distress. ____  HENT:   Head: Normocephalic and atraumatic. ____  Eyes: Conjunctivae are normal. ________  Cardiovascular: Intact distal pulses. ____  Pulmonary/Chest: Effort normal. ________________________  Neurological: She is alert and oriented to person, place, and time. ____  Skin: Skin is dry. She is not diaphoretic. ____  Psychiatric: Mood, affect and judgment normal. ______          Assessment   Vital Signs:      Vitals:    02/02/21 1205   BP: 123/73   Pulse: 81   Temp: 97.2 °F (36.2 °C)       left Knee shows evidence for DJD with medial minimal obvious pseudolaxity, pain with weight bearing, antalgic gait and palpable osteophytes. Give way of the knee and obvious antalgic gait noted    Inspection: Moderate anterior swelling. Swelling is present with mild effusion. The posterior aspect of the knee appears to be full with tenderness. There is no erythema, rash, or ecchymosis. Hypermobility of the patella is noted with obvious crepitus in this area    Range of Motion:  Right 0-120 left -5-110     Pain with varus valgus mild patellofemoral major to testing    There is no noted deformity swelling only    Strength:  Hamstrings rated: 4/5. Quadriceps rated: 4/5    Palpation: There is moderate tenderness along the medial and patellofemoral joint line. Special Tests: Patellar Compression test is positive. Valgus & Varus test is positive. Skin: There are no rashes, ulcerations or lesions.     Gait: Gait pattern is antalgic  Skin shows no rashes/ecchymosis to the affected area, no hyperesthesias, no discoloration, no temperature or color discrepancies. NEUROLOGICALLY: There is no evidence for sensory or motor deficits in the extremity. Coordination appears full with no spacticity or rigidity. Reflexes appear to be symmetric. Distal circulation intact. No signs of RSD. Additional Comments: Hip range of motion appears to be intact she does have significant issues with morbid obesity but is able to mobilize out of a sitting position standing and walking are very difficult but are improved markedly with use of a cane on the opposite side             Diagnostic Test Findings:   Xray   Have reviewed the xrays above from 2/11/2020  and my impression is: AP lateral sunrise and PA flexion of the left knee performed on 2/11/2020 shows mild medial joint space narrowing and significant patellofemoral osteoarthritis. MRI evaluation performed on 3/16/2020 confirms the presence of patellofemoral osteoarthritis small meniscal tear is noted in the knee without evidence of major need for surgical intervention cartilage surfaces medial and laterally appear to be grossly intact other than defects and osteophytes medially which are mild    Assessment and Plan:       Diagnosis Orders   1. Primary osteoarthritis of left knee  TX ARTHROCENTESIS ASPIR&/INJ MAJOR JT/BURSA W/O US    sodium hyaluronate (EUFLEXXA, HYALGAN) injection 20 mg              The orders below, if any, were placed during this visit:   Orders Placed This Encounter   Procedures    TX ARTHROCENTESIS ASPIR&/INJ MAJOR JT/BURSA W/O US              Treatment Plan: I discussed the diagnosis of arthritis with the patient. We've discussed treatment options which would include anti-inflammatory medication, physical therapy, bracing, cortisone injections, and Visco supplementation. We have also discussed the benefits of low impact exercise and weight loss. We have also discussed the possibility of joint replacement surgery in the future.          -Advised patient to continue with physical therapy  -Recommended utilization of Cho-Pat knee strap brace  -Performed viscosupplementation injection of Euflexxa at today's visit to the left knee at today's visit no complaints or concerns arisen. .  -Advised to rest ice and elevate the knee for the next 24 hours and then can resume normal activities as tolerated. -Advised to follow-up in 1 week for third injection of the 3 part series. Injection Procedure Note  Procedure Details     Verbal consent was obtained for the procedure. The left knee(s) was prepped with iodine and the skin was anesthetized. Using a 22 gauge needle the left knee(s) joint is injected with 2 ml viscosupplementation under the lateral aspect of the knee. The injection site was cleansed with topical isopropyl alcohol and a dressing was applied. Complications:  None; patient tolerated the procedure well.        Physical therapy was recommended strongly to try to obtain better quad mechanics and additionally weight loss was emphasized significantly for her and the patellofemoral issues    Her pain appears to be patellofemoral possibly some slight medial joint line irritation         CATA Hickman

## 2021-02-09 ENCOUNTER — OFFICE VISIT (OUTPATIENT)
Dept: ORTHOPEDIC SURGERY | Age: 56
End: 2021-02-09
Payer: COMMERCIAL

## 2021-02-09 VITALS
SYSTOLIC BLOOD PRESSURE: 130 MMHG | BODY MASS INDEX: 45.74 KG/M2 | TEMPERATURE: 98.2 F | HEIGHT: 57 IN | WEIGHT: 212 LBS | DIASTOLIC BLOOD PRESSURE: 74 MMHG | HEART RATE: 84 BPM

## 2021-02-09 DIAGNOSIS — M17.12 PRIMARY OSTEOARTHRITIS OF LEFT KNEE: Primary | ICD-10-CM

## 2021-02-09 PROCEDURE — 20610 DRAIN/INJ JOINT/BURSA W/O US: CPT | Performed by: PHYSICIAN ASSISTANT

## 2021-02-09 PROCEDURE — 99024 POSTOP FOLLOW-UP VISIT: CPT | Performed by: PHYSICIAN ASSISTANT

## 2021-02-09 RX ORDER — HYALURONATE SODIUM 10 MG/ML
20 SYRINGE (ML) INTRAARTICULAR ONCE
Status: COMPLETED | OUTPATIENT
Start: 2021-02-09 | End: 2021-02-09

## 2021-02-09 RX ADMIN — Medication 20 MG: at 14:40

## 2021-02-09 NOTE — PROGRESS NOTES
Patient Name: Verner Laster  : 1965  DOS: 2021        Chief Complaint:   Chief Complaint   Patient presents with    Knee Pain     lt knee-Euflexxa #3       History of Present Illness:  Verner Laster is a 48 y.o. female who presents with a chief complaint of continued complaints of pain in the knee with irritation with walking, stairs and with overuse. Pain in the knee regularly with swelling and discomfort. Currently: 2021  Patient is here for follow-up regarding left knee pain and osteoarthritis. She is here for the third Euflexxa injection of the 3 part series. She notes that she was doing better at the previous injection states her pain today is a 7 out of 10 but notes she has been more active as of the past few days may be has aggravated her tweaked it. Denies any fall trauma or injury denies numbness burning tingling radiating pains down the leg. Still utilizing a soft compression brace over the knee but not utilizing any assistive walking devices. previously: 2021  Patient is here for follow-up regarding left knee pain and osteoarthritis. She is here for the second Euflexxa injection of the 3 part series she notes improvement in regards to her pain. States she has been able to mobilize better and get around a lot better because of it. Rates her pain is a 2 out of 10. She is hopeful for continued improvement with finishing up the injection. Denies any fall trauma or injury denies numbness burning tingling radiating pains down the leg. Utilizing a cane and brace and the need help with ambulation. 2021  Patient is here for follow-up regarding left knee pain and osteoarthritis. She notes that the cortisone injection that was performed on 2021 provided little to no relief. She notes that helped for about 1 week and then faded off. She does note that the locking pain that she was having has reduced a little bit.   She is still utilizing a cane to help with ambulation. Denies numbness burning tingling radiating pains in the leg. Denies any fall trauma or injury. Rates her overall pain is. 10. She does sit the Euflexxa injections helped her significantly lasted 5 almost 6 months with utilization last time so she is hopeful for the same kind of improvement with this series. Previously: 1/5/2021  Patient is here for follow-up regarding left knee pain and osteoarthritis. She notes that the knee is locked up on her she is unable to fully extend the knee and has a lot of pain in the anterior aspect of the knee. Note she has been utilizing ice rest elevation is much as possible as well as muscle cramps and ibuprofen which provides some benefit. Rates her overall pain as a 9 out of 10. She denies any fall trauma or injury still utilizing cane to help with ambulation denies any numbness burning tingling radiating pains down the leg. She is hopeful to restart the gel injections if possible as they helped her for 6 months time and works very well in regards to controlling her overall pain until about 1 month ago. If not she would be helpful to pursue some form of cortisone injection if possible to help provide some relief. She is still utilizing a soft compression brace across the knee which does provide some relief. 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 Notes pain worsens with transitions, full extension, deep flexion stairs and/or bending/squatting or kneeling. Previously: 12/22/2020  Azul Sizer twice at work with injury to the knees. Difficulty with getting up from sitting         7/28/2020  Patient is here for follow-up regarding left knee pain osteoarthritis she is here for third and final Euflexxa injection of the three-part series. She notes she has had improvement in her overall pain notes that she did irritate the knee yesterday so her pain is a 5 out of 10 but notes overall she is doing a lot better with the series.   She has gone back to work on Monday and has done well so far over the past 2 days. Denies any fall trauma or injury denies numbness burning tingling radiating pains down the leg. She does note that she has tried going without the brace but does note some hyperextension buckling that occurs randomly. She is still pursuing physical therapy and plans to pursue exercises on her own at home including utilization of a bike. Previously: 7/21/2020  Patient is here for follow-up regarding left knee pain and osteoarthritis. She is also here for her second injection of the Euflexxa series she notes that she has had improvement in her knee pain overall rates it as a 2 out of 10 and is still continuing to perform within physical therapy. She is hopeful to return back to work on Monday of next week. She is still utilizing the brace to help with ambulation on the left knee and utilizes the cane intermittently. Previously: 7/14/2020  Patient is here for follow-up regarding left knee pain and osteoarthritis. She notes she is here for the initiation of the Euflexxa series. She states she is hopeful that this will provide better relief in regards to pain versus the cortisone injections. 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 Note she is utilizing a compression brace on the knee on a regular basis as it does help with reduction in pain and some stabilization. Notes most of the pain is with transitions, stairs and standing and walking for long periods of time. Rates her overall pain is a 7 at 8 out of 10. Denies numbness burning tingling radiating pains down the leg. Denies any falls trauma or injury. Is utilizing a cane to help with ambulation. Previously: 6/2/2020  Increase in pain over the past several months time. 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 Did increase in pain with work activities.    Pain and slipping regularly. Did not do well with cortisone injections. She stated she had periods of time where she had to be hospitalized after the cortisone injections the knee would lock up and she would have severe amounts of pain. No gel injections were obtained            Medical History  Current Medications:   Prior to Admission medications    Medication Sig Start Date End Date Taking? Authorizing Provider   Diclofenac Sodium POWD 2 g by Does not apply route 4 times daily Formula #3D Elizabeth 3% Baclo 2% Diclo 3%  Jai 6% Lido 2% Prilo 2% 6/2/20   Kelsie Joe MD   diclofenac (VOLTAREN) 50 MG EC tablet Take 1 tablet by mouth 2 times daily (with meals) 6/2/20   Kelsie Joe MD   fluticasone (FLONASE) 50 MCG/ACT nasal spray 2 sprays by Nasal route daily 2/26/19 2/26/20  Josephine Brennan MD     Allergies: No Known Allergies    Review of Systems:     Review of Systems ______  Constitutional: Negative for fever and diaphoresis. ____________  Respiratory: Negative for shortness of breath.  ________  Gastrointestinal: Negative for abdominal pain. ________  Musculoskeletal: Positive for joint pain. ____  Skin: Negative for itching. ____  Neurological: Negative for loss of consciousness. ______________  All other systems reviewed and negative     No past medical history on file.   Family History   Problem Relation Age of Onset    Kidney Disease Mother     Heart Attack Father      Social History     Socioeconomic History    Marital status: Single     Spouse name: Not on file    Number of children: Not on file    Years of education: Not on file    Highest education level: Not on file   Occupational History    Not on file   Social Needs    Financial resource strain: Not on file    Food insecurity     Worry: Not on file     Inability: Not on file    Transportation needs     Medical: Not on file     Non-medical: Not on file   Tobacco Use    Smoking status: Never Smoker    Smokeless tobacco: Never Used Substance and Sexual Activity    Alcohol use: No    Drug use: No    Sexual activity: Not on file   Lifestyle    Physical activity     Days per week: Not on file     Minutes per session: Not on file    Stress: Not on file   Relationships    Social connections     Talks on phone: Not on file     Gets together: Not on file     Attends Jewish service: Not on file     Active member of club or organization: Not on file     Attends meetings of clubs or organizations: Not on file     Relationship status: Not on file    Intimate partner violence     Fear of current or ex partner: Not on file     Emotionally abused: Not on file     Physically abused: Not on file     Forced sexual activity: Not on file   Other Topics Concern    Not on file   Social History Narrative    Not on file         Physical Exam __  Constitutional: She is oriented to person, place, and time and well-developed, well-nourished, and in no distress. No distress. ____  HENT:   Head: Normocephalic and atraumatic. ____  Eyes: Conjunctivae are normal. ________  Cardiovascular: Intact distal pulses. ____  Pulmonary/Chest: Effort normal. ________________________  Neurological: She is alert and oriented to person, place, and time. ____  Skin: Skin is dry. She is not diaphoretic. ____  Psychiatric: Mood, affect and judgment normal. ______          Assessment   Vital Signs:      Vitals:    02/09/21 1401   BP: 130/74   Pulse: 84   Temp: 98.2 °F (36.8 °C)       left Knee shows evidence for DJD with medial minimal obvious pseudolaxity, pain with weight bearing, antalgic gait and palpable osteophytes. Give way of the knee and obvious antalgic gait noted    Inspection: Moderate anterior swelling. Swelling is present with mild effusion. The posterior aspect of the knee appears to be full with tenderness. There is no erythema, rash, or ecchymosis.   Hypermobility of the patella is noted with obvious crepitus in this area    Range of Motion:  Right 0-120 left -5-110     Pain with varus valgus mild patellofemoral major to testing    There is no noted deformity swelling only    Strength:  Hamstrings rated: 4/5. Quadriceps rated: 4/5    Palpation: There is moderate tenderness along the medial and patellofemoral joint line. Special Tests: Patellar Compression test is positive. Valgus & Varus test is positive. Skin: There are no rashes, ulcerations or lesions. Gait: Gait pattern is antalgic  Skin shows no rashes/ecchymosis to the affected area, no hyperesthesias, no discoloration, no temperature or color discrepancies. NEUROLOGICALLY: There is no evidence for sensory or motor deficits in the extremity. Coordination appears full with no spacticity or rigidity. Reflexes appear to be symmetric. Distal circulation intact. No signs of RSD. Additional Comments: Hip range of motion appears to be intact she does have significant issues with morbid obesity but is able to mobilize out of a sitting position standing and walking are very difficult but are improved markedly with use of a cane on the opposite side             Diagnostic Test Findings:   Xray   Have reviewed the xrays above from 2/11/2020  and my impression is: AP lateral sunrise and PA flexion of the left knee performed on 2/11/2020 shows mild medial joint space narrowing and significant patellofemoral osteoarthritis. MRI evaluation performed on 3/16/2020 confirms the presence of patellofemoral osteoarthritis small meniscal tear is noted in the knee without evidence of major need for surgical intervention cartilage surfaces medial and laterally appear to be grossly intact other than defects and osteophytes medially which are mild    Assessment and Plan:       Diagnosis Orders   1.  Primary osteoarthritis of left knee  LA ARTHROCENTESIS ASPIR&/INJ MAJOR JT/BURSA W/O US    sodium hyaluronate (EUFLEXXA, HYALGAN) injection 20 mg              The orders below, if any, were placed during this visit: No orders of the defined types were placed in this encounter. Treatment Plan: I discussed the diagnosis of arthritis with the patient. We've discussed treatment options which would include anti-inflammatory medication, physical therapy, bracing, cortisone injections, and Visco supplementation. We have also discussed the benefits of low impact exercise and weight loss. We have also discussed the possibility of joint replacement surgery in the future. -Advised patient to continue with physical therapy  -Recommended utilization of Cho-Pat knee strap brace  -Performed viscosupplementation injection of Euflexxa at today's visit to the left knee at today's visit no complaints or concerns arisen. .  -Advised to rest ice and elevate the knee for the next 24 hours and then can resume normal activities as tolerated. -Advised to follow-up in 3 to 4 weeks for repeat evaluation to ensure appropriate resolution of patient's pain if not we will consider ordering MRI for further evaluation. Injection Procedure Note  Procedure Details     Verbal consent was obtained for the procedure. The left knee(s) was prepped with iodine and the skin was anesthetized. Using a 22 gauge needle the left knee(s) joint is injected with 2 ml viscosupplementation under the lateral aspect of the knee. The injection site was cleansed with topical isopropyl alcohol and a dressing was applied. Complications:  None; patient tolerated the procedure well.        Physical therapy was recommended strongly to try to obtain better quad mechanics and additionally weight loss was emphasized significantly for her and the patellofemoral issues    Her pain appears to be patellofemoral possibly some slight medial joint line irritation         CATA Richardson

## 2021-03-10 ENCOUNTER — OFFICE VISIT (OUTPATIENT)
Dept: ORTHOPEDIC SURGERY | Age: 56
End: 2021-03-10
Payer: COMMERCIAL

## 2021-03-10 VITALS
SYSTOLIC BLOOD PRESSURE: 100 MMHG | HEIGHT: 57 IN | DIASTOLIC BLOOD PRESSURE: 65 MMHG | WEIGHT: 212 LBS | BODY MASS INDEX: 45.74 KG/M2 | HEART RATE: 78 BPM | TEMPERATURE: 97.9 F

## 2021-03-10 DIAGNOSIS — G89.29 CHRONIC PAIN OF LEFT KNEE: Primary | ICD-10-CM

## 2021-03-10 DIAGNOSIS — M17.12 PRIMARY OSTEOARTHRITIS OF LEFT KNEE: ICD-10-CM

## 2021-03-10 DIAGNOSIS — M25.562 CHRONIC PAIN OF LEFT KNEE: Primary | ICD-10-CM

## 2021-03-10 PROCEDURE — 99213 OFFICE O/P EST LOW 20 MIN: CPT | Performed by: ORTHOPAEDIC SURGERY

## 2021-03-10 RX ORDER — TRAMADOL HYDROCHLORIDE 50 MG/1
50 TABLET ORAL EVERY 4 HOURS PRN
Qty: 42 TABLET | Refills: 0 | Status: SHIPPED | OUTPATIENT
Start: 2021-03-10 | End: 2021-03-17

## 2021-03-10 NOTE — PROGRESS NOTES
Patient Name: Harrison Latham  : 1965  DOS: 3/10/2021        Chief Complaint:   Chief Complaint   Patient presents with    Knee Pain     Left knee   Patient's pain not improved with this round of Euflexxa. Her symptoms were doing fairly well after the initial round last year but now is still significant and interfering with her work and home activities    History of Present Illness:  Harrison Latham is a 55 y.o. female who presents with a chief complaint of continued complaints of pain in the knee with irritation with walking, stairs and with overuse. Pain in the knee regularly with swelling and discomfort. 2021  Patient is here for follow-up regarding left knee pain and osteoarthritis. She is here for the third Euflexxa injection of the 3 part series. She notes that she was doing better at the previous injection states her pain today is a 7 out of 10 but notes she has been more active as of the past few days may be has aggravated her tweaked it. Denies any fall trauma or injury denies numbness burning tingling radiating pains down the leg. Still utilizing a soft compression brace over the knee but not utilizing any assistive walking devices. previously: 2021  Patient is here for follow-up regarding left knee pain and osteoarthritis. She is here for the second Euflexxa injection of the 3 part series she notes improvement in regards to her pain. States she has been able to mobilize better and get around a lot better because of it. Rates her pain is a 2 out of 10. She is hopeful for continued improvement with finishing up the injection. Denies any fall trauma or injury denies numbness burning tingling radiating pains down the leg. Utilizing a cane and brace and the need help with ambulation. 2021  Patient is here for follow-up regarding left knee pain and osteoarthritis.   She notes that the cortisone injection that was performed on 2021 provided little to no relief. She notes that helped for about 1 week and then faded off. She does note that the locking pain that she was having has reduced a little bit. She is still utilizing a cane to help with ambulation. Denies numbness burning tingling radiating pains in the leg. Denies any fall trauma or injury. Rates her overall pain is. 10. She does sit the Euflexxa injections helped her significantly lasted 5 almost 6 months with utilization last time so she is hopeful for the same kind of improvement with this series. Previously: 1/5/2021  Patient is here for follow-up regarding left knee pain and osteoarthritis. She notes that the knee is locked up on her she is unable to fully extend the knee and has a lot of pain in the anterior aspect of the knee. Note she has been utilizing ice rest elevation is much as possible as well as muscle cramps and ibuprofen which provides some benefit. Rates her overall pain as a 9 out of 10. She denies any fall trauma or injury still utilizing cane to help with ambulation denies any numbness burning tingling radiating pains down the leg. She is hopeful to restart the gel injections if possible as they helped her for 6 months time and works very well in regards to controlling her overall pain until about 1 month ago. If not she would be helpful to pursue some form of cortisone injection if possible to help provide some relief. She is still utilizing a soft compression brace across the knee which does provide some relief. 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 Notes pain worsens with transitions, full extension, deep flexion stairs and/or bending/squatting or kneeling. Previously: 12/22/2020  Lauraoliver Selljett twice at work with injury to the knees. Difficulty with getting up from sitting         7/28/2020  Patient is here for follow-up regarding left knee pain osteoarthritis she is here for third and final Euflexxa injection of the three-part series.   She notes she has had improvement in her overall pain notes that she did irritate the knee yesterday so her pain is a 5 out of 10 but notes overall she is doing a lot better with the series. She has gone back to work on Monday and has done well so far over the past 2 days. Denies any fall trauma or injury denies numbness burning tingling radiating pains down the leg. She does note that she has tried going without the brace but does note some hyperextension buckling that occurs randomly. She is still pursuing physical therapy and plans to pursue exercises on her own at home including utilization of a bike. Previously: 7/21/2020  Patient is here for follow-up regarding left knee pain and osteoarthritis. She is also here for her second injection of the Euflexxa series she notes that she has had improvement in her knee pain overall rates it as a 2 out of 10 and is still continuing to perform within physical therapy. She is hopeful to return back to work on Monday of next week. She is still utilizing the brace to help with ambulation on the left knee and utilizes the cane intermittently. Previously: 7/14/2020  Patient is here for follow-up regarding left knee pain and osteoarthritis. She notes she is here for the initiation of the Euflexxa series. She states she is hopeful that this will provide better relief in regards to pain versus the cortisone injections. 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 Note she is utilizing a compression brace on the knee on a regular basis as it does help with reduction in pain and some stabilization. Notes most of the pain is with transitions, stairs and standing and walking for long periods of time. Rates her overall pain is a 7 at 8 out of 10. Denies numbness burning tingling radiating pains down the leg. Denies any falls trauma or injury.   Is on file    Years of education: Not on file    Highest education level: Not on file   Occupational History    Not on file   Social Needs    Financial resource strain: Not on file    Food insecurity     Worry: Not on file     Inability: Not on file    Transportation needs     Medical: Not on file     Non-medical: Not on file   Tobacco Use    Smoking status: Never Smoker    Smokeless tobacco: Never Used   Substance and Sexual Activity    Alcohol use: No    Drug use: No    Sexual activity: Not on file   Lifestyle    Physical activity     Days per week: Not on file     Minutes per session: Not on file    Stress: Not on file   Relationships    Social connections     Talks on phone: Not on file     Gets together: Not on file     Attends Church service: Not on file     Active member of club or organization: Not on file     Attends meetings of clubs or organizations: Not on file     Relationship status: Not on file    Intimate partner violence     Fear of current or ex partner: Not on file     Emotionally abused: Not on file     Physically abused: Not on file     Forced sexual activity: Not on file   Other Topics Concern    Not on file   Social History Narrative    Not on file         Physical Exam __  Constitutional: She is oriented to person, place, and time and well-developed, well-nourished, and in no distress. No distress. ____  HENT:   Head: Normocephalic and atraumatic. ____  Eyes: Conjunctivae are normal. ________  Cardiovascular: Intact distal pulses. ____  Pulmonary/Chest: Effort normal. ________________________  Neurological: She is alert and oriented to person, place, and time. ____  Skin: Skin is dry.  She is not diaphoretic. ____  Psychiatric: Mood, affect and judgment normal. ______          Assessment   Vital Signs:      Vitals:    03/10/21 1449   BP: 100/65   Pulse: 78   Temp: 97.9 °F (36.6 °C)       left Knee shows evidence for DJD with medial minimal obvious pseudolaxity, pain with weight bearing, antalgic gait and palpable osteophytes. Give way of the knee and obvious antalgic gait noted    Inspection: Moderate anterior swelling. Swelling is present with mild effusion. The posterior aspect of the knee appears to be full with tenderness. There is no erythema, rash, or ecchymosis. Hypermobility of the patella is noted with obvious crepitus in this area    Range of Motion:  Right 0-120 left -5-110     Pain with varus valgus mild patellofemoral major to testing    There is no noted deformity swelling only    Strength:  Hamstrings rated: 4/5. Quadriceps rated: 4/5    Palpation: There is moderate tenderness along the medial and patellofemoral joint line. Special Tests: Patellar Compression test is positive. Valgus & Varus test is positive. Skin: There are no rashes, ulcerations or lesions. Gait: Gait pattern is antalgic  Skin shows no rashes/ecchymosis to the affected area, no hyperesthesias, no discoloration, no temperature or color discrepancies. NEUROLOGICALLY: There is no evidence for sensory or motor deficits in the extremity. Coordination appears full with no spacticity or rigidity. Reflexes appear to be symmetric. Distal circulation intact. No signs of RSD. Additional Comments: Hip range of motion appears to be intact she does have significant issues with morbid obesity but is able to mobilize out of a sitting position standing and walking are very difficult but are improved markedly with use of a cane on the opposite side             Diagnostic Test Findings:   Xray   Have reviewed the xrays above from 2/11/2020  and my impression is: AP lateral sunrise and PA flexion of the left knee performed on 2/11/2020 shows mild medial joint space narrowing and significant patellofemoral osteoarthritis.   MRI evaluation performed on 3/16/2020 confirms the presence of patellofemoral osteoarthritis small meniscal tear is noted in the knee without evidence of major need for surgical intervention cartilage surfaces medial and laterally appear to be grossly intact other than defects and osteophytes medially which are mild    Assessment and Plan:       Diagnosis Orders   1. Chronic pain of left knee  traMADol (ULTRAM) 50 MG tablet   2. Primary osteoarthritis of left knee  traMADol (ULTRAM) 50 MG tablet              The orders below, if any, were placed during this visit:   No orders of the defined types were placed in this encounter. Treatment Plan: I discussed the diagnosis of arthritis with the patient. We've discussed treatment options which would include anti-inflammatory medication, physical therapy, bracing, cortisone injections, and Visco supplementation. We have also discussed the benefits of low impact exercise and weight loss. We have also discussed the possibility of joint replacement surgery in the future. -Advised patient to continue with physical therapy  -Recommended utilization of Cho-Pat knee strap brace           Physical therapy was recommended strongly to try to obtain better quad mechanics and additionally weight loss was emphasized significantly for her and the patellofemoral issues    Her pain appears to be patellofemoral possibly some slight medial joint line irritation. Plan for arthroscopy as needed based upon any worsening of symptoms.             Reena Reddy MD

## 2021-03-16 ENCOUNTER — TELEPHONE (OUTPATIENT)
Dept: ORTHOPEDIC SURGERY | Age: 56
End: 2021-03-16

## 2021-04-07 ENCOUNTER — TELEPHONE (OUTPATIENT)
Dept: ORTHOPEDIC SURGERY | Age: 56
End: 2021-04-07

## 2021-04-07 DIAGNOSIS — M17.12 PRIMARY OSTEOARTHRITIS OF LEFT KNEE: Primary | ICD-10-CM

## 2021-04-08 RX ORDER — TRAMADOL HYDROCHLORIDE 50 MG/1
50 TABLET ORAL EVERY 4 HOURS PRN
Qty: 42 TABLET | Refills: 0 | Status: SHIPPED | OUTPATIENT
Start: 2021-04-08 | End: 2021-04-13 | Stop reason: SDUPTHER

## 2021-04-13 ENCOUNTER — TELEPHONE (OUTPATIENT)
Dept: ORTHOPEDIC SURGERY | Age: 56
End: 2021-04-13

## 2021-04-13 DIAGNOSIS — M17.12 PRIMARY OSTEOARTHRITIS OF LEFT KNEE: ICD-10-CM

## 2021-04-13 RX ORDER — TRAMADOL HYDROCHLORIDE 50 MG/1
50 TABLET ORAL EVERY 4 HOURS PRN
Qty: 42 TABLET | Refills: 0 | Status: SHIPPED | OUTPATIENT
Start: 2021-04-13 | End: 2021-04-20

## 2021-04-16 ENCOUNTER — TELEPHONE (OUTPATIENT)
Dept: ORTHOPEDIC SURGERY | Age: 56
End: 2021-04-16

## 2021-04-27 ENCOUNTER — OFFICE VISIT (OUTPATIENT)
Dept: ORTHOPEDIC SURGERY | Age: 56
End: 2021-04-27
Payer: COMMERCIAL

## 2021-04-27 VITALS
DIASTOLIC BLOOD PRESSURE: 77 MMHG | WEIGHT: 212 LBS | BODY MASS INDEX: 45.74 KG/M2 | HEIGHT: 57 IN | SYSTOLIC BLOOD PRESSURE: 132 MMHG | HEART RATE: 77 BPM

## 2021-04-27 DIAGNOSIS — M22.42 CHONDROMALACIA OF PATELLOFEMORAL JOINT, LEFT: ICD-10-CM

## 2021-04-27 DIAGNOSIS — M25.562 CHRONIC PAIN OF LEFT KNEE: Primary | ICD-10-CM

## 2021-04-27 DIAGNOSIS — S72.435G: ICD-10-CM

## 2021-04-27 DIAGNOSIS — G89.29 CHRONIC PAIN OF LEFT KNEE: Primary | ICD-10-CM

## 2021-04-27 DIAGNOSIS — M17.12 PRIMARY OSTEOARTHRITIS OF LEFT KNEE: ICD-10-CM

## 2021-04-27 PROCEDURE — 99214 OFFICE O/P EST MOD 30 MIN: CPT | Performed by: PHYSICIAN ASSISTANT

## 2021-04-27 RX ORDER — TRAMADOL HYDROCHLORIDE 50 MG/1
50 TABLET ORAL EVERY 6 HOURS PRN
Qty: 28 TABLET | Refills: 0 | Status: SHIPPED | OUTPATIENT
Start: 2021-04-27 | End: 2021-05-04

## 2021-04-27 NOTE — PROGRESS NOTES
here for follow-up regarding left knee pain and osteoarthritis. She notes she is here for the initiation of the Euflexxa series. She states she is hopeful that this will provide better relief in regards to pain versus the cortisone injections. 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 Note she is utilizing a compression brace on the knee on a regular basis as it does help with reduction in pain and some stabilization. Notes most of the pain is with transitions, stairs and standing and walking for long periods of time. Rates her overall pain is a 7 at 8 out of 10. Denies numbness burning tingling radiating pains down the leg. Denies any falls trauma or injury. Is utilizing a cane to help with ambulation. Previously: 6/2/2020  Increase in pain over the past several months time. 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 Did increase in pain with work activities. Pain and slipping regularly. Did not do well with cortisone injections. She stated she had periods of time where she had to be hospitalized after the cortisone injections the knee would lock up and she would have severe amounts of pain. No gel injections were obtained            Medical History  Current Medications:   Prior to Admission medications    Medication Sig Start Date End Date Taking? Authorizing Provider   diclofenac (VOLTAREN) 50 MG EC tablet Take 1 tablet by mouth 2 times daily (with meals) 4/27/21  Yes CATA Marino   traMADol (ULTRAM) 50 MG tablet Take 1 tablet by mouth every 6 hours as needed for Pain for up to 7 days. Intended supply: 7 days.  Take lowest dose possible to manage pain 4/27/21 5/4/21 Yes CATA Ortega   Diclofenac Sodium POWD 2 g by Does not apply route 4 times daily Formula #3D Elizabeth 3% Baclo 2% Diclo 3%  Jai 6% Lido 2% Prilo 2% 6/2/20   Cliff Jimenez MD   fluticasone (FLONASE) 50 MCG/ACT nasal spray 2 sprays by Nasal route daily 2/26/19 2/26/20  Mili Shaw MD     Allergies: No Known Allergies    Review of Systems:     Review of Systems ______  Constitutional: Negative for fever and diaphoresis. ____________  Respiratory: Negative for shortness of breath.  ________  Gastrointestinal: Negative for abdominal pain. ________  Musculoskeletal: Positive for joint pain. ____  Skin: Negative for itching. ____  Neurological: Negative for loss of consciousness. ______________  All other systems reviewed and negative     No past medical history on file.   Family History   Problem Relation Age of Onset    Kidney Disease Mother     Heart Attack Father      Social History     Socioeconomic History    Marital status: Single     Spouse name: Not on file    Number of children: Not on file    Years of education: Not on file    Highest education level: Not on file   Occupational History    Not on file   Social Needs    Financial resource strain: Not on file    Food insecurity     Worry: Not on file     Inability: Not on file    Transportation needs     Medical: Not on file     Non-medical: Not on file   Tobacco Use    Smoking status: Never Smoker    Smokeless tobacco: Never Used   Substance and Sexual Activity    Alcohol use: No    Drug use: No    Sexual activity: Not on file   Lifestyle    Physical activity     Days per week: Not on file     Minutes per session: Not on file    Stress: Not on file   Relationships    Social connections     Talks on phone: Not on file     Gets together: Not on file     Attends Hindu service: Not on file     Active member of club or organization: Not on file     Attends meetings of clubs or organizations: Not on file     Relationship status: Not on file    Intimate partner violence     Fear of current or ex partner: Not on file     Emotionally abused: Not on file     Physically abused: Not on file     Forced sexual activity: Not on file   Other Topics Concern  Not on file   Social History Narrative    Not on file         Physical Exam __  Constitutional: She is oriented to person, place, and time and well-developed, well-nourished, and in no distress. No distress. ____  HENT:   Head: Normocephalic and atraumatic. ____  Eyes: Conjunctivae are normal. ________  Cardiovascular: Intact distal pulses. ____  Pulmonary/Chest: Effort normal. ________________________  Neurological: She is alert and oriented to person, place, and time. ____  Skin: Skin is dry. She is not diaphoretic. ____  Psychiatric: Mood, affect and judgment normal. ______          Assessment   Vital Signs:      Vitals:    04/27/21 1207   BP: 132/77   Pulse: 77       left Knee shows evidence for DJD with medial minimal obvious pseudolaxity, pain with weight bearing, antalgic gait and palpable osteophytes. Give way of the knee and obvious antalgic gait noted    Inspection: Moderate anterior swelling. Swelling is present with mild effusion. The posterior aspect of the knee appears to be full with tenderness. There is no erythema, rash, or ecchymosis. Hypermobility of the patella is noted with obvious crepitus in this area    Range of Motion:  Right 0-120 left -5-110     Pain with varus valgus mild patellofemoral major to testing    There is no noted deformity swelling only    Strength:  Hamstrings rated: 4/5. Quadriceps rated: 4/5    Palpation: There is moderate tenderness along the medial and patellofemoral joint line. Special Tests: Patellar Compression test is positive. Valgus & Varus test is positive. Skin: There are no rashes, ulcerations or lesions. Gait: Gait pattern is antalgic  Skin shows no rashes/ecchymosis to the affected area, no hyperesthesias, no discoloration, no temperature or color discrepancies. NEUROLOGICALLY: There is no evidence for sensory or motor deficits in the extremity. Coordination appears full with no spacticity or rigidity. Reflexes appear to be symmetric. Distal circulation intact. No signs of RSD. Additional Comments: Hip range of motion appears to be intact she does have significant issues with morbid obesity but is able to mobilize out of a sitting position standing and walking are very difficult but are improved markedly with use of a cane on the opposite side    Baker's cyst noted back of left knee. Diagnostic Test Findings:   Xray   Have reviewed the xrays above from 2/11/2020  and my impression is: AP lateral sunrise and PA flexion of the left knee performed on 2/11/2020 shows mild medial joint space narrowing and significant patellofemoral osteoarthritis. MRI evaluation performed on 3/16/2020 confirms the presence of patellofemoral osteoarthritis small meniscal tear is noted in the knee without evidence of major need for surgical intervention cartilage surfaces medial and laterally appear to be grossly intact other than defects and osteophytes medially which are mild    Assessment and Plan:       Diagnosis Orders   1. Chronic pain of left knee     2. Primary osteoarthritis of left knee  traMADol (ULTRAM) 50 MG tablet              The orders below, if any, were placed during this visit:   No orders of the defined types were placed in this encounter. Treatment Plan: I discussed the diagnosis of arthritis with the patient. We've discussed treatment options which would include anti-inflammatory medication, physical therapy, bracing, cortisone injections, and Visco supplementation. We have also discussed the benefits of low impact exercise and weight loss.   We have also discussed the possibility of joint replacement surgery in the future.      -Discussed with patient that she should utilize compression wrapping to help reduce inflammation and swelling across the lower leg and into the knee and to help reduce irritation from the Baker's cyst.  -Discussed with patient that if we do drain the knee the likelihood is that the fluid will return.    -Discussed with patient that we cannot perform any kind of cortisone injection into the knee as that would delay surgery by 8 weeks time.  -Advised for patient to continue rest ice and elevation of the knee is much as possible to help reduce overall irritation and swelling and pain.  -Discussed with patient pursuance of left knee arthroscopy will help to reduce overall irritation and reduce the Baker's cyst as well. -Refilled prescription for tramadol 1 50 mg tablet every 6-8 hours as needed. -Gave patient prescription for diclofenac 50 mg 1 twice daily as needed pain  -Advised patient to continue with physical therapy  -Recommended utilization of Cho-Pat knee strap brace  -Advised for patient to follow-up 10 days postoperatively unless things worsen she needs to be seen sooner. Physical therapy was recommended strongly to try to obtain better quad mechanics and additionally weight loss was emphasized significantly for her and the patellofemoral issues    Her pain appears to be patellofemoral possibly some slight medial joint line irritation. Plan for arthroscopy as needed based upon any worsening of symptoms.             CATA Hwang

## 2021-05-11 ENCOUNTER — TELEPHONE (OUTPATIENT)
Dept: ORTHOPEDIC SURGERY | Age: 56
End: 2021-05-11

## 2021-05-11 NOTE — TELEPHONE ENCOUNTER
5/18/2021 faxed completed fmla to Lyndon Garcia with [facility] @ [phone]    Working on fmla for the patient. Waiting for a reply from [facility]) regarding surgery information.

## 2021-05-14 ENCOUNTER — TELEPHONE (OUTPATIENT)
Dept: ORTHOPEDIC SURGERY | Age: 56
End: 2021-05-14

## 2021-05-14 PROBLEM — M22.42 CHONDROMALACIA OF PATELLOFEMORAL JOINT, LEFT: Status: ACTIVE | Noted: 2021-05-14

## 2021-05-14 PROBLEM — S72.436G: Status: ACTIVE | Noted: 2021-05-14

## 2021-05-18 ENCOUNTER — OFFICE VISIT (OUTPATIENT)
Dept: PRIMARY CARE CLINIC | Age: 56
End: 2021-05-18
Payer: COMMERCIAL

## 2021-05-18 DIAGNOSIS — Z01.818 PRE-OP EXAMINATION: Primary | ICD-10-CM

## 2021-05-18 PROCEDURE — 99421 OL DIG E/M SVC 5-10 MIN: CPT | Performed by: NURSE PRACTITIONER

## 2021-05-19 LAB — SARS-COV-2: NOT DETECTED

## 2021-05-20 ENCOUNTER — TELEPHONE (OUTPATIENT)
Dept: ORTHOPEDIC SURGERY | Age: 56
End: 2021-05-20

## 2021-05-21 NOTE — PROGRESS NOTES
Attempted to call pt x2. PCP office called, pt has not been in for pre op appt and no appt is scheduled at this time.  Matti Conroy

## 2021-05-24 ENCOUNTER — TELEPHONE (OUTPATIENT)
Dept: ORTHOPEDIC SURGERY | Age: 56
End: 2021-05-24

## 2021-05-24 ENCOUNTER — TELEPHONE (OUTPATIENT)
Dept: FAMILY MEDICINE CLINIC | Age: 56
End: 2021-05-24

## 2021-05-24 RX ORDER — M-VIT,TX,IRON,MINS/CALC/FOLIC 27MG-0.4MG
1 TABLET ORAL DAILY
COMMUNITY

## 2021-05-24 RX ORDER — TRAMADOL HYDROCHLORIDE 50 MG/1
TABLET ORAL
Status: ON HOLD | COMMUNITY
Start: 2021-05-12 | End: 2021-05-26 | Stop reason: HOSPADM

## 2021-05-24 NOTE — TELEPHONE ENCOUNTER
Db tomorrow 230 for preop BUT NEEDS EKG FIRST OTHERWISE I CAN NOT RELEASE HER FOR SURGERY     ORDER EKG AT [facility]

## 2021-05-24 NOTE — PROGRESS NOTES
[facility] patients having surgery or anesthesia are required to be Covid tested OR to have been vaccinated at least 14 days prior to your procedure. It is very important to return our call to [phone] to notify the staff of your last vaccination date or you will be required to complete Covid testing within the 5-6 days prior to surgery & quarantine. We recommend coming to the [facility] flu tent to complete. It is open Monday-Friday 8am-3pm currently. If you go outside [facility] you need to ensure you have a PCR Covid test and fax results to PreAdmission Testing at [phone]. PRIOR TO PROCEDURE DATE:        1. PLEASE FOLLOW ANY  GUIDELINES/ INSTRUCTIONS PRIOR TO YOUR PROCEDURE AS ADVISED BY YOUR SURGEON. 2. Arrange for someone to drive you home and be with you for the first 24 hours after discharge for your safety after your procedure for which you received sedation. Ensure it is someone we can share information with regarding your discharge. 3. You must contact your surgeon for instructions IF:   You are taking any blood thinners, aspirin, anti-inflammatory or vitamin E.   There is a change in your physical condition such as a cold, fever, rash, cuts, sores or any other infection, especially near your surgical site. 4. Do not drink alcohol the day before or day of your procedure. 5. A Pre-op History and Physical for surgery MUST be completed by your Physician or Urgent Care within 30 days of your procedure date. Please bring a copy with you on the day of your procedure and along with any other testing performed. THE DAY OF YOUR PROCEDURE:  1. Follow instructions for ARRIVAL TIME as DIRECTED BY YOUR SURGEON. 2. Enter the MAIN entrance from [address] [place] [address] and follow the signs to the free [employer] or [employer] parking (offered free of charge 6am-5pm).             3. Enter the Main Entrance of the hospital (do not enter from the lower level of the parking garage). Upon entrance, check in with the  at the main desk on your left. If no one is available at the desk, proceed into the [facility] Waiting Room and go through the door directly into the [facility]. There is a Check-in desk ACROSS from Room 5 (marked with a sign hanging from the ceiling). The phone number for the surgery center is [phone]. 4. Please call [phone] option #2 option #2 if you have not been preregistered yet. On the day of your procedure bring your insurance card and photo ID. You will be registered at your bedside once brought back to your room. 5. DO NOT EAT ANYTHING eight hours prior to your arrival for surgery. May have 8 ounces of water 4 hours prior to your arrival for surgery. NOTE: ALL Gastric, Bariatric and Bowel surgery patients MUST follow their surgeon's instructions. 6. MEDICATIONS    Take the following medications with a SMALL sip of water:    Use your usual dose of inhalers the morning of surgery. BRING your rescue inhaler with you to hospital.    Anesthesia does NOT want you to take insulin the morning of surgery. They will control your blood sugar while you are at the hospital. Please contact your ordering physician for instructions regarding your insulin the night before your procedure. If you have an insulin pump, please keep it set on basal rate. 7. Do not swallow water when brushing teeth. No gum, candy, mints or ice chips. Refrain from smoking or at least decrease the amount. 8. Dress in loose, comfortable clothing appropriate for redressing after your procedure. Do not wear jewelry (including body piercings), make-up (especially NO eye make-up), fingernail polish (NO toenail polish if foot/leg surgery), lotion, powders or metal hairclips. 9. Dentures, glasses, or contacts will need to be removed before your procedure.  Bring cases for your glasses, be removed near the surgical site will be clipped by a healthcare worker using a special clippers designed to avoid skin irritation. 5. When you are in the operating room, your surgical site will be cleansed with a special soap, and in most cases, you will be given an antibiotic before the surgery begins. What to expect AFTER YOUR PROCEDURE:  1. Immediately following your procedure, your will be taken to the PACU for the first phase of your recovery. Your nurse will help you recover from any potential side effects of anesthesia, such as extreme drowsiness, changes in your vital signs or breathing patterns. Nausea, headache, muscle aches, or sore throat may also occur after anesthesia. Your nurse will help you manage these potential side effects. 2. For comfort and safety, arrange to have someone at home with you for the first 24 hours after discharge. 3. You and your family will be given written instructions about your diet, activity, dressing care, medications, and return visits. 4. Once at home, should issues with nausea, pain, or bleeding occur, or should you notice any signs of infection, you should call your surgeon. 5. Always clean your hands before and after caring for your wound. Do not let your family touch your surgery site without cleaning their hands. 6. Narcotic pain medications can cause significant constipation. You may want to add a stool softener to your postoperative medication schedule or speak to your surgeon on how best to manage this SIDE EFFECT. SPECIAL INSTRUCTIONS  Thank you for allowing us to care for you. We strive to exceed your expectations in the delivery of care and service provided to you and your family. If you need to contact the [place] [address] staff for any reason, please call us at [phone]    Instructions reviewed with patient during preadmission testing phone interview.   Micaela Ahumada RN.5/24/2021 .7:17 AM      ADDITIONAL EDUCATIONAL INFORMATION REVIEWED PER PHONE WITH YOU AND/OR YOUR FAMILY:    Yes Antibacterial Soap

## 2021-05-24 NOTE — TELEPHONE ENCOUNTER
----- Message from Maude Harris sent at 5/24/2021  7:26 AM EDT -----  Subject: Appointment Request    Reason for Call: Routine Pre-Op    QUESTIONS  Type of Appointment? Established Patient  Reason for appointment request? No appointments available during search  Additional Information for Provider? Patient said she has a cough from her   sinus. Patient also needs to be seen for a Pre-Op before this Wednesday.   ---------------------------------------------------------------------------  --------------  CALL BACK INFO  What is the best way for the office to contact you? OK to leave message on   voicemail  Preferred Call Back Phone Number? [phone]  ---------------------------------------------------------------------------  --------------  SCRIPT ANSWERS  Relationship to Patient? Self  Appointment reason? Symptomatic  Select script based on patient symptoms? Adult Pre-Op   Do you have question for your provider that need to be answered prior to   scheduling your pre-op appointment? No  Have you been diagnosed with, tested for, or told that you are suspected   of having COVID-19 (Coronavirus)? No  Have you had a fever or taken medication to treat a fever within the past   3 days? No  Have you had a cough, shortness of breath or flu-like symptoms within the   past 3 days?  Yes

## 2021-05-25 ENCOUNTER — OFFICE VISIT (OUTPATIENT)
Dept: FAMILY MEDICINE CLINIC | Age: 56
End: 2021-05-25
Payer: COMMERCIAL

## 2021-05-25 ENCOUNTER — ANESTHESIA EVENT (OUTPATIENT)
Dept: OPERATING ROOM | Age: 56
End: 2021-05-25
Payer: COMMERCIAL

## 2021-05-25 VITALS
SYSTOLIC BLOOD PRESSURE: 120 MMHG | HEIGHT: 57 IN | DIASTOLIC BLOOD PRESSURE: 80 MMHG | WEIGHT: 219 LBS | OXYGEN SATURATION: 99 % | HEART RATE: 91 BPM | BODY MASS INDEX: 47.25 KG/M2 | TEMPERATURE: 97.7 F

## 2021-05-25 DIAGNOSIS — Z01.810 PREOP CARDIOVASCULAR EXAM: Primary | ICD-10-CM

## 2021-05-25 DIAGNOSIS — M22.42 CHONDROMALACIA OF PATELLOFEMORAL JOINT, LEFT: ICD-10-CM

## 2021-05-25 LAB
A/G RATIO: 1 (ref 1.1–2.2)
ALBUMIN SERPL-MCNC: 3.8 G/DL (ref 3.4–5)
ALP BLD-CCNC: 140 U/L (ref 40–129)
ALT SERPL-CCNC: 17 U/L (ref 10–40)
ANION GAP SERPL CALCULATED.3IONS-SCNC: 11 MMOL/L (ref 3–16)
AST SERPL-CCNC: 14 U/L (ref 15–37)
BASOPHILS ABSOLUTE: 0.1 K/UL (ref 0–0.2)
BASOPHILS RELATIVE PERCENT: 0.7 %
BILIRUB SERPL-MCNC: <0.2 MG/DL (ref 0–1)
BUN BLDV-MCNC: 10 MG/DL (ref 7–20)
CALCIUM SERPL-MCNC: 9.3 MG/DL (ref 8.3–10.6)
CHLORIDE BLD-SCNC: 94 MMOL/L (ref 99–110)
CO2: 27 MMOL/L (ref 21–32)
CREAT SERPL-MCNC: <0.5 MG/DL (ref 0.6–1.1)
EOSINOPHILS ABSOLUTE: 0.2 K/UL (ref 0–0.6)
EOSINOPHILS RELATIVE PERCENT: 1.5 %
GFR AFRICAN AMERICAN: >60
GFR NON-AFRICAN AMERICAN: >60
GLOBULIN: 3.7 G/DL
GLUCOSE BLD-MCNC: 218 MG/DL (ref 70–99)
HCT VFR BLD CALC: 37.5 % (ref 36–48)
HEMOGLOBIN: 12.2 G/DL (ref 12–16)
INR BLD: 1.03 (ref 0.86–1.14)
LYMPHOCYTES ABSOLUTE: 2.8 K/UL (ref 1–5.1)
LYMPHOCYTES RELATIVE PERCENT: 22.5 %
MCH RBC QN AUTO: 24.9 PG (ref 26–34)
MCHC RBC AUTO-ENTMCNC: 32.6 G/DL (ref 31–36)
MCV RBC AUTO: 76.4 FL (ref 80–100)
MONOCYTES ABSOLUTE: 0.6 K/UL (ref 0–1.3)
MONOCYTES RELATIVE PERCENT: 4.6 %
NEUTROPHILS ABSOLUTE: 8.7 K/UL (ref 1.7–7.7)
NEUTROPHILS RELATIVE PERCENT: 70.7 %
PDW BLD-RTO: 17.4 % (ref 12.4–15.4)
PLATELET # BLD: 238 K/UL (ref 135–450)
PMV BLD AUTO: 8.8 FL (ref 5–10.5)
POTASSIUM SERPL-SCNC: 4.3 MMOL/L (ref 3.5–5.1)
PROTHROMBIN TIME: 12 SEC (ref 10–13.2)
RBC # BLD: 4.91 M/UL (ref 4–5.2)
SODIUM BLD-SCNC: 132 MMOL/L (ref 136–145)
TOTAL PROTEIN: 7.5 G/DL (ref 6.4–8.2)
WBC # BLD: 12.3 K/UL (ref 4–11)

## 2021-05-25 PROCEDURE — 93000 ELECTROCARDIOGRAM COMPLETE: CPT | Performed by: NURSE PRACTITIONER

## 2021-05-25 PROCEDURE — 99213 OFFICE O/P EST LOW 20 MIN: CPT | Performed by: NURSE PRACTITIONER

## 2021-05-25 ASSESSMENT — PATIENT HEALTH QUESTIONNAIRE - PHQ9
2. FEELING DOWN, DEPRESSED OR HOPELESS: 0
SUM OF ALL RESPONSES TO PHQ QUESTIONS 1-9: 0

## 2021-05-25 NOTE — PROGRESS NOTES
Requesting surgeon: Dr Daija Guillory  Reason for Consult: Preoperative Evaluation of Risk  Surgery location: [facility]  Surgery date:05/26/21    HPI:   Andrés Carney is a 55 y.o. female with history of left knee pain. Presents for pre op evaluation for left knee arthroscopy   Denies fever, chills, or current illness. Denies personal or family history of anesthesia complications. Medications:  Current Outpatient Medications   Medication Sig Dispense Refill    traMADol (ULTRAM) 50 MG tablet       Multiple Vitamins-Minerals (THERAPEUTIC MULTIVITAMIN-MINERALS) tablet Take 1 tablet by mouth daily      diclofenac (VOLTAREN) 50 MG EC tablet Take 1 tablet by mouth 2 times daily (with meals) 60 tablet 3    Diclofenac Sodium POWD 2 g by Does not apply route 4 times daily Formula #3D Elizabeth 3% Baclo 2% Diclo 3%  Jai 6% Lido 2% Prilo 2% 240 g 5    fluticasone (FLONASE) 50 MCG/ACT nasal spray 2 sprays by Nasal route daily 1 Bottle 3     No current facility-administered medications for this visit. Allergies:  Patient has no known allergies.   History:  Past Medical History:   Diagnosis Date    Sinus congestion      Family:  Family History   Problem Relation Age of Onset    Kidney Disease Mother     Heart Attack Father      Social history:  Social History     Socioeconomic History    Marital status: Single     Spouse name: Not on file    Number of children: Not on file    Years of education: Not on file    Highest education level: Not on file   Occupational History    Not on file   Tobacco Use    Smoking status: Never Smoker    Smokeless tobacco: Never Used   Substance and Sexual Activity    Alcohol use: No    Drug use: No    Sexual activity: Not on file   Other Topics Concern    Not on file   Social History Narrative    Not on file     Social Determinants of Health     Financial Resource Strain:     Difficulty of Paying Living Expenses:    Food Insecurity:     Worried About Running Out of Food in the Last Year:     Ran Out of Food in the Last Year:    Transportation Needs:     Lack of Transportation (Medical):  Lack of Transportation (Non-Medical):    Physical Activity:     Days of Exercise per Week:     Minutes of Exercise per Session:    Stress:     Feeling of Stress :    Social Connections:     Frequency of Communication with Friends and Family:     Frequency of Social Gatherings with Friends and Family:     Attends Oriental orthodox Services:     Active Member of Clubs or Organizations:     Attends Club or Organization Meetings:     Marital Status:    Intimate Partner Violence:     Fear of Current or Ex-Partner:     Emotionally Abused:     Physically Abused:     Sexually Abused:      Surgical history:  Past Surgical History:   Procedure Laterality Date    BREAST REDUCTION SURGERY      about 10 years ago    ERCP  01/2015    with stent     UVULECTOMY         ROS:  Constitutional: Denies unexplained weight loss. Skin-Denies rashes or unhealing wounds  Neuro- Denies dizziness, headache, or seizures. HEENT- Denies vision disturbances, tinnitus, vertigo, sinus congestion, or sore throat  Cardiovascular: Denies chest pain, palpitations, dyspnea, or syncope. Respiratory- Denies SOB, wheezing, hemoptysis, or difficulty breathing. - Denies dysuria or hematuria   GI- Denies abdominal pain, nausea/vomiting, or dysphagia. Musculoskeletal: Denies joint pain  Other- Can climb a flight of stairs or walk up a hill without chest pain or shortness of breath. Physical Exam:  Vital signs:   Vitals:    05/25/21 1326   BP: 120/80   Pulse: 91   Temp: 97.7 °F (36.5 °C)   SpO2: 99%   Weight: 219 lb (99.3 kg)   Height: 4' 9\" (1.448 m)     Constitutional: Alert and oriented x 3, no apparent distress  HEENT: PERRL, EOMI, moist mucus membranes  Neck: Supple. Resp: CTA bilaterally, no wheezes or rhonchi  Cardio: RRR without MRG.    GI: Soft, nontender, nondistended, BS+  Extremities: No edema  Neurological: Grossly intact. Skin: Warm & dry    Additional testing:   EKG-SR  MRSA-Swab done in office  All labs ordered by Dr. Phoenix Case and will be in EPIC        Assessment:   Diagnosis Orders   1. Preop cardiovascular exam  EKG 12 lead    Culture, Nasal    EKG 12 lead   2. Chondromalacia of patellofemoral joint, left         Functional capacity: > 4 METs    Inherent cardiac risk of planned procedure: High (>5%): Intermediate (1-5%); Low (<1%)    Revised Cardiac Index Score:   1 point for each of the following: high-risk surgery, CAD, insulin, CKD/Cr>2, history of stroke/TIA, and CHF. Risk for cardiac complications (ischemia, MI, arrhythmia)    0 points=0.4%, 1 point=0.9%, 2 points=4%, 3+ points=9%. Patient score: 0.4%    Plan:   1. - Risk/benefit analysis favors proceding with the planned operation pending labs which will be [facility] prior to her procedure.    Electronically signed by: ANDREA Tan CNP, 5/25/2021, 1:59 PM

## 2021-05-25 NOTE — PROGRESS NOTES
[facility] / [facility] ([facility]) [address] [facility], [address]    Acknowledgment of Informed Consent for Surgical or Medical Procedure and Sedation  I agree to allow doctor(s)Karlos Hwang and his/her associates or assistants, including residents and/or other qualified medical practitioner to perform the following medical treatment or procedure and to administer or direct the administration of sedation as necessary:  Procedure(s): LEFT KNEE ARTHROSCOPY WITH PATELLOFEMORAL CHONDROPLASTY, SUBCHONDROPLASTY PATELLA    My doctor has explained the following regarding the proposed procedure:   the explanation of the procedure   the benefits of the procedure   the potential problems that might occur during recuperation   the risks and side effects of the procedure which could include but are not limited to severe blood loss, infection, stroke or death   the benefits, risks and side effect of alternative procedures including the consequences of declining this procedure or any alternative procedures   the likelihood of achieving satisfactory results. I acknowledge no guarantee or assurance has been made to me regarding the results. I understand that during the course of this treatment/procedure, unforeseen conditions can occur which require an additional or different procedure. I agree to allow my physician or assistants to perform such extension of the original procedure as they may find necessary. I understand that sedation will often result in temporary impairment of memory and fine motor skills and that sedation can occasionally progress to a state of deep sedation or general anesthesia. I understand the risks of anesthesia for surgery include, but are not limited to, sore throat, hoarseness, injury to face, mouth, or teeth; nausea; headache; injury to blood vessels or nerves; death, brain damage, or paralysis.     I understand that if I have a Limitation of Treatment order in effect during my hospitalization, the order may or may not be in effect during this procedure. I give my doctor permission to give me blood or blood products. I understand that there are risks with receiving blood such as hepatitis, AIDS, fever, or allergic reaction. I acknowledge that the risks, benefits, and alternatives of this treatment have been explained to me and that no express or implied warranty has been given by the hospital, any blood bank, or any person or entity as to the blood or blood components transfused. At the discretion of my doctor, I agree to allow observers, equipment/product representatives and allow photographing, and/or televising of the procedure, provided my name or identity is maintained confidentially. I agree the hospital may dispose of or use for scientific or educational purposes any tissue, fluid, or body parts which may be removed.     ________________________________Date________Time______ am/pm  ([employer])  Patient or Signature of Closest Relative or Legal Guardian    ________________________________Date________Time______am/pm      Page 1 of  1  Witness

## 2021-05-26 ENCOUNTER — APPOINTMENT (OUTPATIENT)
Dept: GENERAL RADIOLOGY | Age: 56
End: 2021-05-26
Attending: ORTHOPAEDIC SURGERY
Payer: COMMERCIAL

## 2021-05-26 ENCOUNTER — ANESTHESIA (OUTPATIENT)
Dept: OPERATING ROOM | Age: 56
End: 2021-05-26
Payer: COMMERCIAL

## 2021-05-26 ENCOUNTER — HOSPITAL ENCOUNTER (OUTPATIENT)
Age: 56
Setting detail: OUTPATIENT SURGERY
Discharge: HOME | End: 2021-05-26
Attending: ORTHOPAEDIC SURGERY | Admitting: ORTHOPAEDIC SURGERY
Payer: COMMERCIAL

## 2021-05-26 VITALS
BODY MASS INDEX: 45.52 KG/M2 | TEMPERATURE: 97.3 F | RESPIRATION RATE: 14 BRPM | DIASTOLIC BLOOD PRESSURE: 75 MMHG | HEART RATE: 85 BPM | SYSTOLIC BLOOD PRESSURE: 116 MMHG | HEIGHT: 57 IN | WEIGHT: 211 LBS | OXYGEN SATURATION: 97 %

## 2021-05-26 VITALS
SYSTOLIC BLOOD PRESSURE: 99 MMHG | DIASTOLIC BLOOD PRESSURE: 68 MMHG | TEMPERATURE: 97.7 F | RESPIRATION RATE: 20 BRPM | OXYGEN SATURATION: 100 %

## 2021-05-26 DIAGNOSIS — Z98.890 STATUS POST ARTHROSCOPY OF LEFT KNEE: Primary | ICD-10-CM

## 2021-05-26 LAB
ESTIMATED AVERAGE GLUCOSE: 240.3 MG/DL
GLUCOSE BLD-MCNC: 163 MG/DL (ref 70–99)
GLUCOSE BLD-MCNC: 233 MG/DL (ref 70–99)
HBA1C MFR BLD: 10 %
PERFORMED ON: ABNORMAL
PERFORMED ON: ABNORMAL
PREGNANCY, URINE: NEGATIVE

## 2021-05-26 PROCEDURE — 3600000004 HC SURGERY LEVEL 4 BASE: Performed by: ORTHOPAEDIC SURGERY

## 2021-05-26 PROCEDURE — 6360000002 HC RX W HCPCS: Performed by: ORTHOPAEDIC SURGERY

## 2021-05-26 PROCEDURE — 20610 DRAIN/INJ JOINT/BURSA W/O US: CPT | Performed by: ORTHOPAEDIC SURGERY

## 2021-05-26 PROCEDURE — 2500000003 HC RX 250 WO HCPCS: Performed by: NURSE ANESTHETIST, CERTIFIED REGISTERED

## 2021-05-26 PROCEDURE — 6360000002 HC RX W HCPCS: Performed by: NURSE ANESTHETIST, CERTIFIED REGISTERED

## 2021-05-26 PROCEDURE — 6360000002 HC RX W HCPCS: Performed by: ANESTHESIOLOGY

## 2021-05-26 PROCEDURE — 7100000000 HC PACU RECOVERY - FIRST 15 MIN: Performed by: ORTHOPAEDIC SURGERY

## 2021-05-26 PROCEDURE — 2709999900 HC NON-CHARGEABLE SUPPLY: Performed by: ORTHOPAEDIC SURGERY

## 2021-05-26 PROCEDURE — 77002 NEEDLE LOCALIZATION BY XRAY: CPT | Performed by: ORTHOPAEDIC SURGERY

## 2021-05-26 PROCEDURE — 2720000010 HC SURG SUPPLY STERILE: Performed by: ORTHOPAEDIC SURGERY

## 2021-05-26 PROCEDURE — 3700000001 HC ADD 15 MINUTES (ANESTHESIA): Performed by: ORTHOPAEDIC SURGERY

## 2021-05-26 PROCEDURE — 2580000003 HC RX 258: Performed by: ANESTHESIOLOGY

## 2021-05-26 PROCEDURE — 29881 ARTHRS KNE SRG MNISECTMY M/L: CPT | Performed by: ORTHOPAEDIC SURGERY

## 2021-05-26 PROCEDURE — 20900 REMOVAL OF BONE FOR GRAFT: CPT | Performed by: ORTHOPAEDIC SURGERY

## 2021-05-26 PROCEDURE — 84703 CHORIONIC GONADOTROPIN ASSAY: CPT

## 2021-05-26 PROCEDURE — 7100000010 HC PHASE II RECOVERY - FIRST 15 MIN: Performed by: ORTHOPAEDIC SURGERY

## 2021-05-26 PROCEDURE — 6370000000 HC RX 637 (ALT 250 FOR IP): Performed by: ORTHOPAEDIC SURGERY

## 2021-05-26 PROCEDURE — 7100000001 HC PACU RECOVERY - ADDTL 15 MIN: Performed by: ORTHOPAEDIC SURGERY

## 2021-05-26 PROCEDURE — 3209999900 FLUORO FOR SURGICAL PROCEDURES

## 2021-05-26 PROCEDURE — 3600000014 HC SURGERY LEVEL 4 ADDTL 15MIN: Performed by: ORTHOPAEDIC SURGERY

## 2021-05-26 PROCEDURE — 7100000011 HC PHASE II RECOVERY - ADDTL 15 MIN: Performed by: ORTHOPAEDIC SURGERY

## 2021-05-26 PROCEDURE — 3700000000 HC ANESTHESIA ATTENDED CARE: Performed by: ORTHOPAEDIC SURGERY

## 2021-05-26 PROCEDURE — 27599 UNLISTED PX FEMUR/KNEE: CPT | Performed by: ORTHOPAEDIC SURGERY

## 2021-05-26 PROCEDURE — 2500000003 HC RX 250 WO HCPCS: Performed by: ANESTHESIOLOGY

## 2021-05-26 PROCEDURE — 6370000000 HC RX 637 (ALT 250 FOR IP): Performed by: PHYSICIAN ASSISTANT

## 2021-05-26 PROCEDURE — 73560 X-RAY EXAM OF KNEE 1 OR 2: CPT

## 2021-05-26 RX ORDER — PROPOFOL 10 MG/ML
INJECTION, EMULSION INTRAVENOUS PRN
Status: DISCONTINUED | OUTPATIENT
Start: 2021-05-26 | End: 2021-05-26 | Stop reason: SDUPTHER

## 2021-05-26 RX ORDER — DEXTROSE MONOHYDRATE 25 G/50ML
12.5 INJECTION, SOLUTION INTRAVENOUS PRN
Status: DISCONTINUED | OUTPATIENT
Start: 2021-05-26 | End: 2021-05-26 | Stop reason: HOSPADM

## 2021-05-26 RX ORDER — HYDRALAZINE HYDROCHLORIDE 20 MG/ML
5 INJECTION INTRAMUSCULAR; INTRAVENOUS EVERY 10 MIN PRN
Status: DISCONTINUED | OUTPATIENT
Start: 2021-05-26 | End: 2021-05-26 | Stop reason: HOSPADM

## 2021-05-26 RX ORDER — NICOTINE POLACRILEX 4 MG
15 LOZENGE BUCCAL PRN
Status: DISCONTINUED | OUTPATIENT
Start: 2021-05-26 | End: 2021-05-26 | Stop reason: HOSPADM

## 2021-05-26 RX ORDER — PROCHLORPERAZINE EDISYLATE 5 MG/ML
5 INJECTION INTRAMUSCULAR; INTRAVENOUS
Status: DISCONTINUED | OUTPATIENT
Start: 2021-05-26 | End: 2021-05-26 | Stop reason: HOSPADM

## 2021-05-26 RX ORDER — SODIUM CHLORIDE 0.9 % (FLUSH) 0.9 %
10 SYRINGE (ML) INJECTION PRN
Status: DISCONTINUED | OUTPATIENT
Start: 2021-05-26 | End: 2021-05-26 | Stop reason: HOSPADM

## 2021-05-26 RX ORDER — OXYCODONE HYDROCHLORIDE AND ACETAMINOPHEN 5; 325 MG/1; MG/1
1 TABLET ORAL EVERY 6 HOURS PRN
Qty: 28 TABLET | Refills: 0 | Status: SHIPPED | OUTPATIENT
Start: 2021-05-26 | End: 2021-06-02

## 2021-05-26 RX ORDER — MEPERIDINE HYDROCHLORIDE 25 MG/ML
12.5 INJECTION INTRAMUSCULAR; INTRAVENOUS; SUBCUTANEOUS EVERY 5 MIN PRN
Status: DISCONTINUED | OUTPATIENT
Start: 2021-05-26 | End: 2021-05-26 | Stop reason: HOSPADM

## 2021-05-26 RX ORDER — SODIUM CHLORIDE 0.9 % (FLUSH) 0.9 %
10 SYRINGE (ML) INJECTION EVERY 12 HOURS SCHEDULED
Status: DISCONTINUED | OUTPATIENT
Start: 2021-05-26 | End: 2021-05-26 | Stop reason: HOSPADM

## 2021-05-26 RX ORDER — SODIUM CHLORIDE, SODIUM LACTATE, POTASSIUM CHLORIDE, CALCIUM CHLORIDE 600; 310; 30; 20 MG/100ML; MG/100ML; MG/100ML; MG/100ML
INJECTION, SOLUTION INTRAVENOUS CONTINUOUS
Status: DISCONTINUED | OUTPATIENT
Start: 2021-05-26 | End: 2021-05-26 | Stop reason: HOSPADM

## 2021-05-26 RX ORDER — CELECOXIB 200 MG/1
200 CAPSULE ORAL ONCE
Status: COMPLETED | OUTPATIENT
Start: 2021-05-26 | End: 2021-05-26

## 2021-05-26 RX ORDER — ONDANSETRON 2 MG/ML
INJECTION INTRAMUSCULAR; INTRAVENOUS PRN
Status: DISCONTINUED | OUTPATIENT
Start: 2021-05-26 | End: 2021-05-26 | Stop reason: SDUPTHER

## 2021-05-26 RX ORDER — INSULIN LISPRO 100 [IU]/ML
0-3 INJECTION, SOLUTION INTRAVENOUS; SUBCUTANEOUS NIGHTLY
Status: DISCONTINUED | OUTPATIENT
Start: 2021-05-26 | End: 2021-05-26 | Stop reason: HOSPADM

## 2021-05-26 RX ORDER — PHENYLEPHRINE HYDROCHLORIDE 10 MG/ML
INJECTION INTRAVENOUS PRN
Status: DISCONTINUED | OUTPATIENT
Start: 2021-05-26 | End: 2021-05-26 | Stop reason: SDUPTHER

## 2021-05-26 RX ORDER — LIDOCAINE HCL/PF 100 MG/5ML
SYRINGE (ML) INJECTION PRN
Status: DISCONTINUED | OUTPATIENT
Start: 2021-05-26 | End: 2021-05-26 | Stop reason: SDUPTHER

## 2021-05-26 RX ORDER — INSULIN LISPRO 100 [IU]/ML
0-6 INJECTION, SOLUTION INTRAVENOUS; SUBCUTANEOUS
Status: DISCONTINUED | OUTPATIENT
Start: 2021-05-26 | End: 2021-05-26 | Stop reason: HOSPADM

## 2021-05-26 RX ORDER — APREPITANT 40 MG/1
40 CAPSULE ORAL ONCE
Status: COMPLETED | OUTPATIENT
Start: 2021-05-26 | End: 2021-05-26

## 2021-05-26 RX ORDER — INSULIN LISPRO 100 [IU]/ML
0-6 INJECTION, SOLUTION INTRAVENOUS; SUBCUTANEOUS EVERY 4 HOURS
Status: DISCONTINUED | OUTPATIENT
Start: 2021-05-26 | End: 2021-05-26 | Stop reason: HOSPADM

## 2021-05-26 RX ORDER — ROCURONIUM BROMIDE 10 MG/ML
INJECTION, SOLUTION INTRAVENOUS PRN
Status: DISCONTINUED | OUTPATIENT
Start: 2021-05-26 | End: 2021-05-26 | Stop reason: SDUPTHER

## 2021-05-26 RX ORDER — HEPARIN SODIUM 1000 [USP'U]/ML
INJECTION, SOLUTION INTRAVENOUS; SUBCUTANEOUS PRN
Status: DISCONTINUED | OUTPATIENT
Start: 2021-05-26 | End: 2021-05-26 | Stop reason: ALTCHOICE

## 2021-05-26 RX ORDER — CEFAZOLIN SODIUM 2 G/50ML
2000 SOLUTION INTRAVENOUS ONCE
Status: COMPLETED | OUTPATIENT
Start: 2021-05-26 | End: 2021-05-26

## 2021-05-26 RX ORDER — OXYCODONE HYDROCHLORIDE AND ACETAMINOPHEN 5; 325 MG/1; MG/1
2 TABLET ORAL ONCE
Status: DISCONTINUED | OUTPATIENT
Start: 2021-05-26 | End: 2021-05-26

## 2021-05-26 RX ORDER — 0.9 % SODIUM CHLORIDE 0.9 %
500 INTRAVENOUS SOLUTION INTRAVENOUS
Status: DISCONTINUED | OUTPATIENT
Start: 2021-05-26 | End: 2021-05-26 | Stop reason: HOSPADM

## 2021-05-26 RX ORDER — OXYCODONE HYDROCHLORIDE AND ACETAMINOPHEN 5; 325 MG/1; MG/1
1 TABLET ORAL ONCE
Status: COMPLETED | OUTPATIENT
Start: 2021-05-26 | End: 2021-05-26

## 2021-05-26 RX ORDER — SODIUM CHLORIDE 9 MG/ML
25 INJECTION, SOLUTION INTRAVENOUS PRN
Status: DISCONTINUED | OUTPATIENT
Start: 2021-05-26 | End: 2021-05-26 | Stop reason: HOSPADM

## 2021-05-26 RX ORDER — SUCCINYLCHOLINE CHLORIDE 20 MG/ML
INJECTION INTRAMUSCULAR; INTRAVENOUS PRN
Status: DISCONTINUED | OUTPATIENT
Start: 2021-05-26 | End: 2021-05-26 | Stop reason: SDUPTHER

## 2021-05-26 RX ORDER — OXYCODONE HYDROCHLORIDE AND ACETAMINOPHEN 5; 325 MG/1; MG/1
2 TABLET ORAL PRN
Status: COMPLETED | OUTPATIENT
Start: 2021-05-26 | End: 2021-05-26

## 2021-05-26 RX ORDER — HYDROMORPHONE HCL 110MG/55ML
PATIENT CONTROLLED ANALGESIA SYRINGE INTRAVENOUS PRN
Status: DISCONTINUED | OUTPATIENT
Start: 2021-05-26 | End: 2021-05-26 | Stop reason: SDUPTHER

## 2021-05-26 RX ORDER — INSULIN LISPRO 100 [IU]/ML
0.08 INJECTION, SOLUTION INTRAVENOUS; SUBCUTANEOUS
Status: DISCONTINUED | OUTPATIENT
Start: 2021-05-26 | End: 2021-05-26 | Stop reason: HOSPADM

## 2021-05-26 RX ORDER — OXYCODONE HYDROCHLORIDE AND ACETAMINOPHEN 5; 325 MG/1; MG/1
1 TABLET ORAL PRN
Status: COMPLETED | OUTPATIENT
Start: 2021-05-26 | End: 2021-05-26

## 2021-05-26 RX ORDER — ALBUTEROL SULFATE 2.5 MG/3ML
2.5 SOLUTION RESPIRATORY (INHALATION) EVERY 6 HOURS PRN
Status: DISCONTINUED | OUTPATIENT
Start: 2021-05-26 | End: 2021-05-26 | Stop reason: HOSPADM

## 2021-05-26 RX ORDER — METOCLOPRAMIDE HYDROCHLORIDE 5 MG/ML
INJECTION INTRAMUSCULAR; INTRAVENOUS PRN
Status: DISCONTINUED | OUTPATIENT
Start: 2021-05-26 | End: 2021-05-26 | Stop reason: SDUPTHER

## 2021-05-26 RX ORDER — ONDANSETRON 2 MG/ML
4 INJECTION INTRAMUSCULAR; INTRAVENOUS
Status: DISCONTINUED | OUTPATIENT
Start: 2021-05-26 | End: 2021-05-26 | Stop reason: HOSPADM

## 2021-05-26 RX ORDER — HYDROCODONE BITARTRATE AND ACETAMINOPHEN 5; 325 MG/1; MG/1
1 TABLET ORAL
Status: DISCONTINUED | OUTPATIENT
Start: 2021-05-26 | End: 2021-05-26 | Stop reason: HOSPADM

## 2021-05-26 RX ORDER — DIPHENHYDRAMINE HYDROCHLORIDE 50 MG/ML
12.5 INJECTION INTRAMUSCULAR; INTRAVENOUS
Status: DISCONTINUED | OUTPATIENT
Start: 2021-05-26 | End: 2021-05-26 | Stop reason: HOSPADM

## 2021-05-26 RX ORDER — DEXTROSE MONOHYDRATE 50 MG/ML
100 INJECTION, SOLUTION INTRAVENOUS PRN
Status: DISCONTINUED | OUTPATIENT
Start: 2021-05-26 | End: 2021-05-26 | Stop reason: HOSPADM

## 2021-05-26 RX ADMIN — SODIUM CHLORIDE, POTASSIUM CHLORIDE, SODIUM LACTATE AND CALCIUM CHLORIDE: 600; 310; 30; 20 INJECTION, SOLUTION INTRAVENOUS at 11:53

## 2021-05-26 RX ADMIN — ROCURONIUM BROMIDE 30 MG: 10 INJECTION INTRAVENOUS at 15:09

## 2021-05-26 RX ADMIN — PROPOFOL 50 MG: 10 INJECTION, EMULSION INTRAVENOUS at 15:10

## 2021-05-26 RX ADMIN — Medication 100 MG: at 15:09

## 2021-05-26 RX ADMIN — HYDROMORPHONE HYDROCHLORIDE 0.5 MG: 1 INJECTION, SOLUTION INTRAMUSCULAR; INTRAVENOUS; SUBCUTANEOUS at 16:59

## 2021-05-26 RX ADMIN — PHENYLEPHRINE HYDROCHLORIDE 100 MCG: 10 INJECTION INTRAVENOUS at 16:03

## 2021-05-26 RX ADMIN — APREPITANT 40 MG: 40 CAPSULE ORAL at 12:09

## 2021-05-26 RX ADMIN — ONDANSETRON 4 MG: 2 INJECTION INTRAMUSCULAR; INTRAVENOUS at 15:07

## 2021-05-26 RX ADMIN — PROPOFOL 40 MG: 10 INJECTION, EMULSION INTRAVENOUS at 16:19

## 2021-05-26 RX ADMIN — CEFAZOLIN SODIUM 2000 MG: 2 SOLUTION INTRAVENOUS at 15:20

## 2021-05-26 RX ADMIN — OXYCODONE HYDROCHLORIDE AND ACETAMINOPHEN 1 TABLET: 5; 325 TABLET ORAL at 11:52

## 2021-05-26 RX ADMIN — HYDROMORPHONE HYDROCHLORIDE 0.5 MG: 1 INJECTION, SOLUTION INTRAMUSCULAR; INTRAVENOUS; SUBCUTANEOUS at 16:49

## 2021-05-26 RX ADMIN — CELECOXIB 200 MG: 200 CAPSULE ORAL at 11:52

## 2021-05-26 RX ADMIN — PROPOFOL 50 MG: 10 INJECTION, EMULSION INTRAVENOUS at 16:17

## 2021-05-26 RX ADMIN — PHENYLEPHRINE HYDROCHLORIDE 100 MCG: 10 INJECTION INTRAVENOUS at 15:21

## 2021-05-26 RX ADMIN — FAMOTIDINE 20 MG: 10 INJECTION, SOLUTION INTRAVENOUS at 12:09

## 2021-05-26 RX ADMIN — SODIUM CHLORIDE, POTASSIUM CHLORIDE, SODIUM LACTATE AND CALCIUM CHLORIDE: 600; 310; 30; 20 INJECTION, SOLUTION INTRAVENOUS at 16:03

## 2021-05-26 RX ADMIN — OXYCODONE HYDROCHLORIDE AND ACETAMINOPHEN 2 TABLET: 5; 325 TABLET ORAL at 17:27

## 2021-05-26 RX ADMIN — HYDROMORPHONE HYDROCHLORIDE 0.5 MG: 1 INJECTION, SOLUTION INTRAMUSCULAR; INTRAVENOUS; SUBCUTANEOUS at 16:54

## 2021-05-26 RX ADMIN — HYDROMORPHONE HYDROCHLORIDE 0.5 MG: 1 INJECTION, SOLUTION INTRAMUSCULAR; INTRAVENOUS; SUBCUTANEOUS at 16:44

## 2021-05-26 RX ADMIN — METOCLOPRAMIDE 10 MG: 5 INJECTION, SOLUTION INTRAMUSCULAR; INTRAVENOUS at 15:10

## 2021-05-26 RX ADMIN — PROPOFOL 100 MG: 10 INJECTION, EMULSION INTRAVENOUS at 15:09

## 2021-05-26 RX ADMIN — SUGAMMADEX 200 MG: 100 INJECTION, SOLUTION INTRAVENOUS at 16:18

## 2021-05-26 RX ADMIN — PHENYLEPHRINE HYDROCHLORIDE 100 MCG: 10 INJECTION INTRAVENOUS at 15:26

## 2021-05-26 RX ADMIN — SUCCINYLCHOLINE CHLORIDE 120 MG: 20 INJECTION, SOLUTION INTRAMUSCULAR; INTRAVENOUS; PARENTERAL at 15:10

## 2021-05-26 RX ADMIN — HYDROMORPHONE HYDROCHLORIDE 1 MG: 2 INJECTION, SOLUTION INTRAMUSCULAR; INTRAVENOUS; SUBCUTANEOUS at 15:09

## 2021-05-26 ASSESSMENT — PAIN DESCRIPTION - ORIENTATION
ORIENTATION: LEFT

## 2021-05-26 ASSESSMENT — PAIN DESCRIPTION - DESCRIPTORS
DESCRIPTORS: ACHING

## 2021-05-26 ASSESSMENT — PULMONARY FUNCTION TESTS
PIF_VALUE: 35
PIF_VALUE: 31
PIF_VALUE: 9
PIF_VALUE: 35
PIF_VALUE: 28
PIF_VALUE: 27
PIF_VALUE: 22
PIF_VALUE: 23
PIF_VALUE: 32
PIF_VALUE: 30
PIF_VALUE: 25
PIF_VALUE: 24
PIF_VALUE: 29
PIF_VALUE: 9
PIF_VALUE: 28
PIF_VALUE: 23
PIF_VALUE: 27
PIF_VALUE: 22
PIF_VALUE: 1
PIF_VALUE: 1
PIF_VALUE: 29
PIF_VALUE: 26
PIF_VALUE: 27
PIF_VALUE: 34
PIF_VALUE: 23
PIF_VALUE: 23
PIF_VALUE: 22
PIF_VALUE: 23
PIF_VALUE: 35
PIF_VALUE: 1
PIF_VALUE: 27
PIF_VALUE: 30
PIF_VALUE: 25
PIF_VALUE: 23
PIF_VALUE: 23
PIF_VALUE: 24
PIF_VALUE: 29
PIF_VALUE: 23
PIF_VALUE: 27
PIF_VALUE: 1
PIF_VALUE: 23

## 2021-05-26 ASSESSMENT — PAIN SCALES - GENERAL
PAINLEVEL_OUTOF10: 8
PAINLEVEL_OUTOF10: 7
PAINLEVEL_OUTOF10: 8
PAINLEVEL_OUTOF10: 4
PAINLEVEL_OUTOF10: 0
PAINLEVEL_OUTOF10: 8
PAINLEVEL_OUTOF10: 7
PAINLEVEL_OUTOF10: 8
PAINLEVEL_OUTOF10: 8

## 2021-05-26 ASSESSMENT — PAIN DESCRIPTION - LOCATION
LOCATION: KNEE

## 2021-05-26 ASSESSMENT — PAIN DESCRIPTION - FREQUENCY
FREQUENCY: CONTINUOUS

## 2021-05-26 ASSESSMENT — PAIN DESCRIPTION - PAIN TYPE
TYPE: SURGICAL PAIN

## 2021-05-26 ASSESSMENT — PAIN - FUNCTIONAL ASSESSMENT: PAIN_FUNCTIONAL_ASSESSMENT: 0-10

## 2021-05-26 ASSESSMENT — LIFESTYLE VARIABLES: SMOKING_STATUS: 0

## 2021-05-26 NOTE — PROGRESS NOTES
Ambulatory Surgery/Procedure Discharge Note    Vitals:    05/26/21 1752   BP: 116/75   Pulse: 85   Resp: 14   Temp: 97.3 °F (36.3 °C)   SpO2: 97%       In: 1440 [P.O.:240; I.V.:1200]  Out: -     Restroom use offered before discharge. Yes  Assisted pt to BR to Void QS X1 via wheelchair    Pain assessment:  level of pain (1-10, 10 severe),   Pain Level: 4    Pt tolerates po well and using IS well with non productive cough at times  Drsg to LLE D&I with immobilizer and <3sec cap refill and +2palp pedal pulse with Ice pack in place. DC instructions given to pt and sister with verbalization of understanding of pt and sister with both states 'ready to go home'. Patient discharged to home/self care.  Patient discharged via wheel chair by transporter to waiting family/S.O.       5/26/2021 6:34 PM

## 2021-05-26 NOTE — H&P
Zhang Pickens    [id number]    [facility] Same Day Surgery Update H & P  Department of General Surgery   Surgical Service   Pre-operative History and Physical  Last H & P within the last 30 days. DIAGNOSIS:   Chondromalacia of left patella [M22.42]  Left knee pain, unspecified chronicity [M25.562]    Procedure(s):  LEFT KNEE ARTHROSCOPY WITH PATELLOFEMORAL CHONDROPLASTY, SUBCHONDROPLASTY PATELLA     HISTORY OF PRESENT ILLNESS:   Patient is a morbidly obese (Body mass index is 45.66 kg/m².), 55 y.o. female with chronic left knee pain and swelling. Her symptoms have been recalcitrant to conservative treatment and the patient presents today for the above procedure. Covid 19:  Patient denies fever, chills, cough or known exposure to Covid-19. Past Medical History:        Diagnosis Date    Sinus congestion      Past Surgical History:        Procedure Laterality Date    BREAST REDUCTION SURGERY      about 10 years ago    ERCP  01/2015    with stent     UVULECTOMY       Past Social History:  Social History     Socioeconomic History    Marital status: Single     Spouse name: None    Number of children: None    Years of education: None    Highest education level: None   Occupational History    None   Tobacco Use    Smoking status: Never Smoker    Smokeless tobacco: Never Used   Substance and Sexual Activity    Alcohol use: No    Drug use: No    Sexual activity: None   Other Topics Concern    None   Social History Narrative    None     Social Determinants of Health     Financial Resource Strain:     Difficulty of Paying Living Expenses:    Food Insecurity:     Worried About Running Out of Food in the Last Year:     Ran Out of Food in the Last Year:    Transportation Needs:     Lack of Transportation (Medical):      Lack of Transportation (Non-Medical):    Physical Activity:     Days of Exercise per Week:     Minutes of Exercise per Session:    Stress:     Feeling of Stress : department. Patient seen and focused exam done today- no new changes since last physical exam on 5/25/21    2. Access to ancillary services are available per request of the provider.     ANDREA Tirado - CNP     5/26/2021

## 2021-05-26 NOTE — PROGRESS NOTES
Pt had episode of \"feeling like I can't breathe\" and audible stridor. Dr. Endy Parra  Notified, new orders received for raceimic epi and albuterol neb. However, the episode spontaneously resolved without any intervention and her lungs were CTA, oxygen sat maintained at 97% on 0.5Lnc.

## 2021-05-26 NOTE — ANESTHESIA PRE PROCEDURE
Department of Anesthesiology  Preprocedure Note       Name:  Andrés Carney   Age:  55 y.o.  :  1965                                          MRN:  [id number]         Date:  2021      Surgeon: Jose Biswas):  Frandy Lira MD    Procedure: Procedure(s):  LEFT KNEE ARTHROSCOPY WITH PATELLOFEMORAL CHONDROPLASTY, SUBCHONDROPLASTY PATELLA    Medications prior to admission:   Prior to Admission medications    Medication Sig Start Date End Date Taking? Authorizing Provider   traMADol Scarlet Baires) 50 MG tablet  21  Yes Historical Provider, MD   Multiple Vitamins-Minerals (THERAPEUTIC MULTIVITAMIN-MINERALS) tablet Take 1 tablet by mouth daily   Yes Historical Provider, MD   fluticasone (FLONASE) 50 MCG/ACT nasal spray 2 sprays by Nasal route daily 19 Yes Rekha Ortiz MD   diclofenac (VOLTAREN) 50 MG EC tablet Take 1 tablet by mouth 2 times daily (with meals) 21   CATA Jones   Diclofenac Sodium POWD 2 g by Does not apply route 4 times daily Formula #3D Elizabeth 3% Baclo 2% Diclo 3%  Jai 6% Lido 2% Prilo 2% 20   Frandy Lira MD       Current medications:    No current facility-administered medications for this encounter.        Allergies:  No Known Allergies    Problem List:    Patient Active Problem List   Diagnosis Code    Hyperglycemia R73.9    High blood hemoglobin F (HCC) D58.2    Chronic pain of left knee M25.562, G89.29    Primary osteoarthritis of left knee M17.12    Chondromalacia of patellofemoral joint, left M22.42    Closed nondisplaced fracture of medial condyle of femur with delayed healing S72.436G    Preop cardiovascular exam Z01.810       Past Medical History:        Diagnosis Date    Sinus congestion        Past Surgical History:        Procedure Laterality Date    BREAST REDUCTION SURGERY      about 10 years ago    ERCP  2015    with stent     UVULECTOMY         Social History:    Social History     Tobacco Use    Smoking status: Never Smoker    Smokeless tobacco: Never Used   Substance Use Topics    Alcohol use: No                                Counseling given: Not Answered      Vital Signs (Current):   Vitals:    05/20/21 0956 05/24/21 0712 05/26/21 0953   BP:   123/85   Pulse:   92   Resp:   14   Temp:   97.6 °F (36.4 °C)   TempSrc:   Oral   SpO2:   95%   Weight: 212 lb (96.2 kg) 211 lb (95.7 kg) 211 lb (95.7 kg)   Height: 4' 9\" (1.448 m) 4' 9\" (1.448 m) 4' 9\" (1.448 m)                                              BP Readings from Last 3 Encounters:   05/26/21 123/85   05/25/21 120/80   04/27/21 132/77       NPO Status: Time of last liquid consumption: 2300                        Time of last solid consumption: 2300                                                      BMI:   Wt Readings from Last 3 Encounters:   05/26/21 211 lb (95.7 kg)   05/25/21 219 lb (99.3 kg)   04/27/21 212 lb (96.2 kg)     Body mass index is 45.66 kg/m². CBC:   Lab Results   Component Value Date    WBC 12.3 05/25/2021    RBC 4.91 05/25/2021    HGB 12.2 05/25/2021    HCT 37.5 05/25/2021    MCV 76.4 05/25/2021    RDW 17.4 05/25/2021     05/25/2021       CMP:   Lab Results   Component Value Date     05/25/2021    K 4.3 05/25/2021    CL 94 05/25/2021    CO2 27 05/25/2021    BUN 10 05/25/2021    CREATININE <0.5 05/25/2021    GFRAA >60 05/25/2021    AGRATIO 1.0 05/25/2021    LABGLOM >60 05/25/2021    GLUCOSE 218 05/25/2021    PROT 7.5 05/25/2021    CALCIUM 9.3 05/25/2021    BILITOT <0.2 05/25/2021    ALKPHOS 140 05/25/2021    AST 14 05/25/2021    ALT 17 05/25/2021       POC Tests: No results for input(s): POCGLU, POCNA, POCK, POCCL, POCBUN, POCHEMO, POCHCT in the last 72 hours.     Coags:   Lab Results   Component Value Date    PROTIME 12.0 05/25/2021    INR 1.03 05/25/2021       HCG (If Applicable): No results found for: PREGTESTUR, PREGSERUM, HCG, HCGQUANT     ABGs: No results found for: PHART, PO2ART, JTA9LGK, REE5HEK, BEART, C2UFEZKL     Type & Screen (If Applicable):  No results found for: LABABO, LABRH    Drug/Infectious Status (If Applicable):  No results found for: HIV, HEPCAB    COVID-19 Screening (If Applicable):   Lab Results   Component Value Date    COVID19 Not Detected 05/18/2021           Anesthesia Evaluation  Patient summary reviewed and Nursing notes reviewed   history of anesthetic complications: PONV. Airway: Mallampati: II  TM distance: >3 FB   Neck ROM: full  Mouth opening: > = 3 FB Dental:          Pulmonary: breath sounds clear to auscultation  (+) sleep apnea (probable ):      (-) not a current smoker (NEVER)                           Cardiovascular:  Exercise tolerance: good (>4 METS),       (-) past MI    NYHA Classification: I    Rhythm: regular  Rate: normal           Beta Blocker:  Not on Beta Blocker         Neuro/Psych:               GI/Hepatic/Renal:   (+) morbid obesity (BMI 45 )     (-) GERD       Endo/Other:    (+) : arthritis: rheumatoid. , .                 Abdominal:   (+) obese,         Vascular:                                        Anesthesia Plan      general     ASA 3       Induction: intravenous. MIPS: Postoperative opioids intended and Prophylactic antiemetics administered. Anesthetic plan and risks discussed with patient. Plan discussed with CRNA.     Attending anesthesiologist reviewed and agrees with Pre Eval content              Juan Helms DO   5/26/2021

## 2021-05-26 NOTE — OP NOTE
Operative Note      Patient: Mine Corbett  YOB: 1965  MRN: [id number]    Date of Procedure: 2021    Pre-Op Diagnosis: PATELLOFEMORAL CHONDROMALAICA LEFT KNEE PAIN, PATELLAR INSUFFICIENCY FRACTURE    Post-Op Diagnosis: Same loose bodies requiring removal       Procedure(s):  LEFT KNEE ARTHROSCOPY WITH PATELLOFEMORAL CHONDROPLASTY, SUBCHONDROPLASTY PATELLA, 2 LOOSE BODY REMOVALS, RIGHT KNEE INJECTION    Surgeon(s):  Maximo Olivas MD    Assistant:   Surgical Assistant: Uzair Landaverde  Physician Assistant: CATA Asif    Anesthesia: General    Estimated Blood Loss (mL): less than 50     Complications: None    Specimens:   * No specimens in log *    Implants:  * No implants in log *      Drains: * No LDAs found *    Findings: multiple loose bodies, patellofemroal osteoarthritis. Detailed Description of Procedure:   OPERATIVE REPORT      Patient Name:   Mine Corbett Surgeon:   Riccardo Coughlin MD  :   1965 Dictated by:   Riccardo Coughlin MD  MRN #:   [id number] PCP:  Lanette Spain MD   Date of Surgery:   21   Referring:   No ref. provider found     PREOPERATIVE DIAGNOSES:   1. Torn medial  meniscus,    knee. 2. Synovitis left kneeknee. 3. Chondromalacia left knee  4. patella stress fracture  left   5.patellofemoral   knee osteoarthritis left   6. right knee osteoarthritis      POSTOPERATIVE DIAGNOSES:   1. Torn medial meniscus, left  knee   2. Synovitis left  knee   3. Chondromalacia left  knee  MFC, MTP, PATELLA and TROCHLEA. 4.   Right   knee osteoarthritis  5 patellal stress fracture left  6. Two loose bodies removal            PROCEDURES PERFORMED:   1. Arthroscopy   left  knee with partial menisectomy   2. Major Synovectomy - 3 compartments  left   knee    3. Chondroplasty of the [address], MTP, PATELLA and TROCHLEA. left knee. 4. Injection  right  knee with flouro localization  5. Injection left  knee with flouro localization  6.  Bone marrow bone graft harvesting   aspirate  left   hip   7. subchondrplasty  left   knee patella and femur. 8. Loose body removal       SURGEON:   ISABEL Kim Finger: Harry Covarrubias, [facility]  ANESTHESIA:  General.       DETAILS OF PROCEDURE:  The patient was given preop medication and brought to the operating room where the surgery was again confirmed, as scheduled for the left knee. The patient was then placed on the table, given general anesthesia  (and intubated). A tourniquet was placed around the proximal  thigh and the leg was placed into a \"Surgical Assistant\" leg dee. The  knee was then prepped with leftchloroprep and alcohol and then injected with 60 mL of 0.25% Marcaine with epinephrine. The knee was then prepped with DuraPrep and draped in the usual manner. The tourniquet was inflated.)   After exsanguination with an Esmarch bandage, the tourniquet was inflated to a level of 300 mmHg.)  A time-out confirmation was performed, according to the universal protocol. Appropriate antibiotics were given prior to the start of the surgery.)   Arthroscopy was then carried out through the inferolateral portal; saline inflow was superomedial, and an inferomedial portal was also utilized. The suprapatellar pouch was examined first.  multipleloose bodies were seen. The patella was found to have Grade III chondromalacia of theLateral  facet(s). The superior compartment had  significant synovitis and fat pad hypertrophy. The medial compartment was then examined. There were multiple loose bodies. The anterior horn and body of the meniscus wereintact  The posterior horn was found to bedegenerative The [address] had  Grade I chondromalacia of the weight-bearing portion. The MTP had  Grade II chondromalacia. Loose body was found in the tibial cartilage embedded into the cartilage with overlying femoral ridging from the body impaction into the femur.    Medial incision allowed for removal of this loose body as well as additional larger 2.5 parul body in the patellofemroal space  The ACL was examined and noted to be intact. The lateral compartment was examined. There were none loose bodies. The anterior horn and body of the meniscus were intact  intact . The posterior horn was found to intact  The LFC had Grade I chondromalacia of the weight-bearing portion. The LTP had Grade I chondromalacia. A Medial menisectomy was performed, using the aggressor shaver and the linear baskets, which were used to morselize the remaining portion of the torn meniscus. The stump of the meniscus was sealed using the ArthroWand coblation tool. The lower portion of the trochlea was noted to have Grade IV chondromalacia. A chondroplasty of the MFC, MTP, PATELLA and TROCHLEA. was performed using the ArthroWand coblation tool, through the medial /lateral  portals. Marrow aspirate and bone graft was obtained through  incision in the  left anterior iliac crest.   This was fractionated into platelet rich/poor plasma and stem cell aggregate. Injection was done through needle localization into the medial aspect of the anterior third of the tibia and the middle of the femoral condyle medially. Based on the preoperative review of the patient's left knee MRI the location of the bone marrow lesion, consistent with an insufficiency or stress fracture, and the tibial plateau ) and femoral condyle was identified. Preoperative surgical planning allowed further determination of the optimal method for accessing the lesion.  Intraoperatively, image fluoroscopy combined with direct intra-articular correlation with arthroscopic instruments and correlation with reviewing of MRI images and fluoroscopy Were used to guide surgical instruments to the proximity of the subchondral tibial plateau and femoral condyle fracture specifically, the Jamshidi needle Was placed in the subchondral bone of the patella Standard repair methodology was used to treat the subchondral bone defect in the tibial plateau and femoral condyle. Image fluoroscopy was utilized to confirm accurate insertion of the Jamshidi needle Into the subchondral bone  ., Fracture stabilization was performed by injecting  Bone Marrow aspirate/graft concentrate Into the patella bone. Image fluoroscopy was used to monitor the injection process and ensure injection of the bone substitute into the subchondral bone so that following polymerization, the bone substitute stabilize a fracture and facilitated fracture repair. The injected wounds were closed and sterile dressing was applied. Injection of raw marrow and stem cell aggregate was done into the respective areas of bone. Injection of PRP, PPP was done into the joint through a lateral approach. Injection Procedure Note  Procedure Details     Verbal consent was obtained for the procedure. Theleft  knee(s) was prepped with iodine and the skin was anesthetized. Using a 22 gauge needle the left knee(s) joint  under flouro is injected with injection of 6cc of prp low leukocyte count under the lateral aspect of the knee. The injection site was cleansed with topical isopropyl alcohol and a dressing was applied. Complications:  None; patient tolerated the procedure well. Using a 22 gauge needle the right   knee(s) joint is injected with injection of 6cc of prp low leukocyte count under the lateral aspect of the knee. The injection site was cleansed with topical isopropyl alcohol and a dressing was applied. A synovectomy was performed of the medial, lateral and superior compartments, using the Aggressor shaver. Hemostasis was achieved using the coblation tool. The synovectomy was performed through both the medial and lateral portals. The base of the synovial tissue was sealed using the ArthroWand tool. Final video-photographs were taken. The joint was then thoroughly irrigated and suctioned. The instruments were removed.   The

## 2021-05-27 ENCOUNTER — TELEPHONE (OUTPATIENT)
Dept: ORTHOPEDIC SURGERY | Age: 56
End: 2021-05-27

## 2021-05-27 LAB — CULTURE NOSE: NORMAL

## 2021-05-27 NOTE — TELEPHONE ENCOUNTER
Appointment Request     Patient requesting earlier appointment: Yes  Appointment offered to patient: 6/8/21  Patient Contact Number: [phone]    THIS IS 1ST PO SX 5/26/21  PATIENT SCHEDULED FOR 6/8/21

## 2021-05-27 NOTE — TELEPHONE ENCOUNTER
I spoke with the patient and let her know that the appointment for 6/8 is good because we want to see her 10 to 14 days after the surgery. I reviewed with her that she can take her brace off and the ace wraps also.

## 2021-06-08 ENCOUNTER — OFFICE VISIT (OUTPATIENT)
Dept: ORTHOPEDIC SURGERY | Age: 56
End: 2021-06-08

## 2021-06-08 VITALS
HEIGHT: 57 IN | BODY MASS INDEX: 45.52 KG/M2 | HEART RATE: 83 BPM | SYSTOLIC BLOOD PRESSURE: 116 MMHG | WEIGHT: 211 LBS | DIASTOLIC BLOOD PRESSURE: 70 MMHG

## 2021-06-08 DIAGNOSIS — S72.435G: Primary | ICD-10-CM

## 2021-06-08 DIAGNOSIS — M23.42 LOOSE BODY OF LEFT KNEE: ICD-10-CM

## 2021-06-08 PROCEDURE — 99024 POSTOP FOLLOW-UP VISIT: CPT | Performed by: ORTHOPAEDIC SURGERY

## 2021-06-09 NOTE — PROGRESS NOTES
Patient Name: Mikhail Martinez MRN: [id number]   Age: 55 y.o. YOB: 1965   Sex: female         [address] Complaint   Patient presents with    Post-Op Check     LEFT KNEE SCOPE 5/26/21-Doing better       HISTORY OF PRESENT ILLNESS   Patient returns for first postoperative visit status post arthroscopy of their knee. They have mild complaints of pain. There is mild swelling about the knee. They deny any numbness or tingling. They are ambulating with the use of her crutches. Assessment   PHYSICAL EXAM   Vital Signs: /70   Pulse 83   Ht 4' 9\" (1.448 m)   Wt 211 lb (95.7 kg)   BMI 45.66 kg/m²   Examination of the knee shows a well-healed incisions      There is mild swelling. There is no drainage. Range of motion is 0-90°. There is no calf tenderness. The patient is neurovascularly intact. NEUROLOGICALLY: There is no evidence for sensory or motor deficits in the extremity. Coordination appears full with no spacticity or rigidity. Reflexes appear to be symmetric. Skin shows no rashes/ecchymosis to the affected area, no hyperesthesias, no discoloration, no temperature or color discrepancies. Distal circulation intact. No signs of RSD. Left hip wound is her primary complaint with sharp stabbing pains at times. Dysuria appears to be doing well with no signs of infection    IMPRESSION   2-3 week status post  leftknee arthroscopy      PLAN   The patient is doing well 2-3 week status post leftknee arthroscopy. They will begin a general  therapy for aggressive range of motion and strengthening on her own. The patient will slowly resume normal activities.    She is very happy with her current progress and feels that the pain is markedly diminished now and will enable her to be more active on her own  We have cautioned that she should be very mild and progressive with her exercise   The patient will follow up in 4-6 weeks for repeat evaluation.

## 2021-06-24 ENCOUNTER — TELEPHONE (OUTPATIENT)
Dept: ORTHOPEDIC SURGERY | Age: 56
End: 2021-06-24

## 2021-06-24 ENCOUNTER — OFFICE VISIT (OUTPATIENT)
Dept: FAMILY MEDICINE CLINIC | Age: 56
End: 2021-06-24
Payer: COMMERCIAL

## 2021-06-24 VITALS
RESPIRATION RATE: 16 BRPM | BODY MASS INDEX: 43.42 KG/M2 | TEMPERATURE: 97.7 F | WEIGHT: 215.4 LBS | OXYGEN SATURATION: 98 % | SYSTOLIC BLOOD PRESSURE: 118 MMHG | DIASTOLIC BLOOD PRESSURE: 84 MMHG | HEART RATE: 93 BPM | HEIGHT: 59 IN

## 2021-06-24 DIAGNOSIS — E11.9 TYPE 2 DIABETES MELLITUS WITHOUT COMPLICATION, WITH LONG-TERM CURRENT USE OF INSULIN (HCC): ICD-10-CM

## 2021-06-24 DIAGNOSIS — Z79.4 TYPE 2 DIABETES MELLITUS WITHOUT COMPLICATION, WITH LONG-TERM CURRENT USE OF INSULIN (HCC): ICD-10-CM

## 2021-06-24 DIAGNOSIS — Z02.1 NEED FOR HISTORY AND PHYSICAL EXAMINATION FOR EMPLOYMENT: ICD-10-CM

## 2021-06-24 DIAGNOSIS — Z78.9 NEED FOR FOLLOW-UP BY SOCIAL WORKER: ICD-10-CM

## 2021-06-24 DIAGNOSIS — Z00.00 WELL ADULT EXAM: Primary | ICD-10-CM

## 2021-06-24 DIAGNOSIS — E66.01 MORBID OBESITY (HCC): ICD-10-CM

## 2021-06-24 PROBLEM — Z01.810 PREOP CARDIOVASCULAR EXAM: Status: RESOLVED | Noted: 2021-05-25 | Resolved: 2021-06-24

## 2021-06-24 PROCEDURE — 99214 OFFICE O/P EST MOD 30 MIN: CPT | Performed by: FAMILY MEDICINE

## 2021-06-24 PROCEDURE — 99396 PREV VISIT EST AGE 40-64: CPT | Performed by: FAMILY MEDICINE

## 2021-06-24 RX ORDER — METFORMIN HYDROCHLORIDE 500 MG/1
500 TABLET, EXTENDED RELEASE ORAL
Qty: 90 TABLET | Refills: 1 | Status: SHIPPED | OUTPATIENT
Start: 2021-06-24 | End: 2022-08-25 | Stop reason: SDUPTHER

## 2021-06-24 RX ORDER — GLUCOSAMINE HCL/CHONDROITIN SU 500-400 MG
CAPSULE ORAL
Qty: 100 STRIP | Refills: 0 | Status: SHIPPED | OUTPATIENT
Start: 2021-06-24 | End: 2021-11-23 | Stop reason: SDUPTHER

## 2021-06-24 RX ORDER — LANCETS 30 GAUGE
1 EACH MISCELLANEOUS DAILY
Qty: 100 EACH | Refills: 3 | Status: SHIPPED | OUTPATIENT
Start: 2021-06-24

## 2021-06-24 RX ORDER — BLOOD-GLUCOSE METER
1 KIT MISCELLANEOUS DAILY
Qty: 1 KIT | Refills: 0 | Status: SHIPPED | OUTPATIENT
Start: 2021-06-24

## 2021-06-24 ASSESSMENT — PATIENT HEALTH QUESTIONNAIRE - PHQ9
SUM OF ALL RESPONSES TO PHQ QUESTIONS 1-9: 0
2. FEELING DOWN, DEPRESSED OR HOPELESS: 0
SUM OF ALL RESPONSES TO PHQ QUESTIONS 1-9: 0
SUM OF ALL RESPONSES TO PHQ9 QUESTIONS 1 & 2: 0
1. LITTLE INTEREST OR PLEASURE IN DOING THINGS: 0
SUM OF ALL RESPONSES TO PHQ QUESTIONS 1-9: 0

## 2021-06-24 NOTE — TELEPHONE ENCOUNTER
General Question     Subject: Needing a return back to work note. Wants to go back on 7/6.    Patient and /or Facility Request: [facility] Number: [phone]

## 2021-06-25 LAB
MEASLES IMMUNE (IGG): NORMAL
MUMPS AB IGG: NORMAL

## 2021-06-25 ASSESSMENT — ENCOUNTER SYMPTOMS
VOMITING: 0
SHORTNESS OF BREATH: 0
NAUSEA: 0
RHINORRHEA: 0
VISUAL CHANGE: 0
TROUBLE SWALLOWING: 0
EYE ITCHING: 0
CHEST TIGHTNESS: 0
BLURRED VISION: 0
EYE REDNESS: 0
ABDOMINAL PAIN: 0
WHEEZING: 0

## 2021-06-25 NOTE — PROGRESS NOTES
Subjective:      Patient ID: Asia Drake is a 55 y.o. female. Pt here for well adult exam for work. Well Adult Physical   Patient here for a comprehensive physical exam.The patient reports problems - none,   Do you take any herbs or supplements that were not prescribed by a doctor? no Are you taking calcium supplements? no Are you taking aspirin daily? no   History:  Pap pending    Was at hospital on May for arthroscopic surgery of her knee, blood work reviewed for this apt showed:       Diabetes  She presents for her initial diabetic visit. She has type 2 diabetes mellitus. Pertinent negatives for hypoglycemia include no headaches or nervousness/anxiousness. Associated symptoms include fatigue and weight loss. Pertinent negatives for diabetes include no blurred vision, no chest pain, no foot paresthesias, no foot ulcerations, no polydipsia, no polyphagia, no polyuria, no visual change and no weakness. Risk factors for coronary artery disease include obesity and diabetes mellitus. When asked about current treatments, none were reported. When asked about meal planning, she reported none. She has not had a previous visit with a dietitian. She never participates in exercise. An ACE inhibitor/angiotensin II receptor blocker is not being taken. Review of Systems   Constitutional: Positive for fatigue, unexpected weight change and weight loss. Negative for activity change, appetite change and fever. HENT: Negative for congestion, postnasal drip, rhinorrhea and trouble swallowing. Eyes: Negative for blurred vision, redness, itching and visual disturbance. Respiratory: Negative for chest tightness, shortness of breath and wheezing. Cardiovascular: Negative for chest pain and palpitations. Gastrointestinal: Negative for abdominal pain, nausea and vomiting. Endocrine: Negative for polydipsia, polyphagia and polyuria. Genitourinary: Negative for dysuria and urgency.    Musculoskeletal: Negative General: Skin is warm. Capillary Refill: Capillary refill takes less than 2 seconds. Coloration: Skin is not jaundiced or pale. Findings: No erythema or rash. Neurological:      General: No focal deficit present. Mental Status: She is alert and oriented to person, place, and time. Mental status is at baseline. Cranial Nerves: No cranial nerve deficit. Sensory: No sensory deficit. Motor: No weakness or abnormal muscle tone. Coordination: Coordination normal.      Gait: Gait normal.      Deep Tendon Reflexes: Reflexes are normal and symmetric. Psychiatric:         Mood and Affect: Mood normal.         Behavior: Behavior normal.         Thought Content: Thought content normal.         Judgment: Judgment normal.          Assessment:       Diagnosis Orders   1. Well adult exam     2. Type 2 diabetes mellitus without complication, with long-term current use of insulin ([facility])  metFORMIN (GLUCOPHAGE-XR) 500 MG extended release tablet    glucose monitoring (FREESTYLE FREEDOM) kit    blood glucose monitor strips    Lancets MISC   3. Morbid obesity ([address]     4. Need for follow-up by      5. Need for history and physical examination for employment  MEASLES ANTIBODIES (IGG, IGM)    MUMPS ANTIBODY, IGG    RUBEOLA ANTIBODY IGG           Plan:      Well adult:    . Doing well, encourage healthy diet, exercise, safety    . Screening labs (see results from May, reviewed  And interpreted by me for this apt )discussed with pt. . Preventive:  Recommended: colonosocpy or Cologuard    2. New diagnosis with a1c 10. She is resistant to start medical treatment but after discussing pros/cons with hesitancy she agreed to start only metformin and titrate as needed  She already started wt watches,   encourage exercise  Fu in 3 mo  The use and side effects of glucophage are discussed, particularly the incidence of lactic acidosis.  The symptoms of this are weakness, trouble breathing, muscle pain. The patient agrees to avoid all alcohol, especially binge drinking; the need to discontinue the drug for 2 days before and 2 days after any contrast XRays is discussed; and before any major surgical procedures. The drug must be stopped in the event of acute heart disease or kidney problems. The commonest side effects such as bloating, diarrhea, gaseousness and nausea are discussed. Hypoglycemia is unlikely, but is discussed as well. We'll start at 500 mg qd , and titrate upwards depending on the patient's response. Renal and hepatic functions will be tested prior to starting this drug and periodically thereafter.     3. Diet /exercise discussed  Refer to dietitian     MMR titer for work                       Tamica Mary MD

## 2021-06-28 ENCOUNTER — TELEPHONE (OUTPATIENT)
Dept: ORTHOPEDIC SURGERY | Age: 56
End: 2021-06-28

## 2021-06-28 NOTE — TELEPHONE ENCOUNTER
I spoke with the patient and we changed her appointment to 7/6/21. Dr. Genia Castaneda can go over work restrictions at that time.

## 2021-06-28 NOTE — TELEPHONE ENCOUNTER
PATIENT CALLED STATES THAT SHE HAS TO RETURN TO WORK ON July 6TH BUT SHE DOES NOT SEE DR Tobias Bautista UNTIL 6/20 SHE WANTS TO KNOW IF SHE NEEDS TO HAVE A SHORT TERM DISABILITY FORM FILED IN ORDER TO HAVE HER RETURN TO WORK DAY EXTENDED.  PLEASE CONTACT TO ADVISE [phone]

## 2021-06-29 ENCOUNTER — TELEPHONE (OUTPATIENT)
Dept: ORTHOPEDIC SURGERY | Age: 56
End: 2021-06-29

## 2021-06-29 LAB
RUBEOLA (MEASLES) AB IGG: >300 AU/ML
RUBEOLA IGM: 0.12 AU (ref 0–0.79)

## 2021-07-01 DIAGNOSIS — Z11.59 SCREENING FOR GERMAN MEASLES: Primary | ICD-10-CM

## 2021-07-06 ENCOUNTER — OFFICE VISIT (OUTPATIENT)
Dept: ORTHOPEDIC SURGERY | Age: 56
End: 2021-07-06

## 2021-07-06 VITALS
WEIGHT: 215 LBS | BODY MASS INDEX: 43.34 KG/M2 | SYSTOLIC BLOOD PRESSURE: 124 MMHG | DIASTOLIC BLOOD PRESSURE: 77 MMHG | HEART RATE: 91 BPM | HEIGHT: 59 IN

## 2021-07-06 DIAGNOSIS — M17.10 PRIMARY OSTEOARTHRITIS OF KNEE, UNSPECIFIED LATERALITY: Primary | ICD-10-CM

## 2021-07-06 PROCEDURE — 99024 POSTOP FOLLOW-UP VISIT: CPT | Performed by: ORTHOPAEDIC SURGERY

## 2021-07-22 ENCOUNTER — TELEPHONE (OUTPATIENT)
Dept: ORTHOPEDIC SURGERY | Age: 56
End: 2021-07-22

## 2021-08-03 ENCOUNTER — OFFICE VISIT (OUTPATIENT)
Dept: ORTHOPEDIC SURGERY | Age: 56
End: 2021-08-03
Payer: COMMERCIAL

## 2021-08-03 VITALS
HEART RATE: 82 BPM | SYSTOLIC BLOOD PRESSURE: 132 MMHG | HEIGHT: 59 IN | DIASTOLIC BLOOD PRESSURE: 75 MMHG | BODY MASS INDEX: 43.34 KG/M2 | WEIGHT: 215 LBS

## 2021-08-03 DIAGNOSIS — M62.81 QUADRICEPS WEAKNESS: ICD-10-CM

## 2021-08-03 DIAGNOSIS — M17.10 PRIMARY OSTEOARTHRITIS OF KNEE, UNSPECIFIED LATERALITY: ICD-10-CM

## 2021-08-03 DIAGNOSIS — S72.435G: Primary | ICD-10-CM

## 2021-08-03 PROCEDURE — 20610 DRAIN/INJ JOINT/BURSA W/O US: CPT | Performed by: PHYSICIAN ASSISTANT

## 2021-08-03 PROCEDURE — 99024 POSTOP FOLLOW-UP VISIT: CPT | Performed by: PHYSICIAN ASSISTANT

## 2021-08-03 RX ORDER — TRIAMCINOLONE ACETONIDE 40 MG/ML
40 INJECTION, SUSPENSION INTRA-ARTICULAR; INTRAMUSCULAR ONCE
Status: COMPLETED | OUTPATIENT
Start: 2021-08-03 | End: 2021-08-03

## 2021-08-03 RX ORDER — LIDOCAINE HYDROCHLORIDE 10 MG/ML
20 INJECTION, SOLUTION INFILTRATION; PERINEURAL ONCE
Status: COMPLETED | OUTPATIENT
Start: 2021-08-03 | End: 2021-08-03

## 2021-08-03 RX ORDER — ROPIVACAINE HYDROCHLORIDE 5 MG/ML
30 INJECTION, SOLUTION EPIDURAL; INFILTRATION; PERINEURAL ONCE
Status: COMPLETED | OUTPATIENT
Start: 2021-08-03 | End: 2021-08-03

## 2021-08-03 RX ADMIN — TRIAMCINOLONE ACETONIDE 40 MG: 40 INJECTION, SUSPENSION INTRA-ARTICULAR; INTRAMUSCULAR at 16:47

## 2021-08-03 RX ADMIN — ROPIVACAINE HYDROCHLORIDE 30 ML: 5 INJECTION, SOLUTION EPIDURAL; INFILTRATION; PERINEURAL at 16:48

## 2021-08-03 RX ADMIN — LIDOCAINE HYDROCHLORIDE 20 ML: 10 INJECTION, SOLUTION INFILTRATION; PERINEURAL at 16:47

## 2021-08-03 NOTE — PROGRESS NOTES
Patient Name: Elizabeth Barajas MRN: [id number]   Age: 55 y.o. YOB: 1965   Sex: female         [address] Complaint   Patient presents with    Post-Op Check     LT KNEE SCOPE 5/23/21-Having increased pain anterior knee. HISTORY OF PRESENT ILLNESS   Patient returns for second postoperative visit status post arthroscopy of their knee. They have moderate complaints of pain. Patient is still noting significant weakness to extending at the knee and significant pain to the patellar tendon and now increasing pain on the medial aspect of the knee. Noting increasing buckling give way denies any fall trauma or injury denies numbness burning tingling radiating pains down the leg. She has been utilizing the Cho-Pat knee strap brace with limited improvement. Rating overall pain as an 8 out of 10. There is mild swelling about the knee. They deny any numbness or tingling. They are ambulating with out the use of her crutches. Assessment   PHYSICAL EXAM   Vital Signs: /75   Pulse 82   Ht 4' 11\" (1.499 m)   Wt 215 lb (97.5 kg)   BMI 43.42 kg/m²   Examination of the knee shows a well-healed incisions      There is mild swelling. There is no drainage. Range of motion is 0-90°. There is no calf tenderness. The patient is neurovascularly intact. Difficulty with extension of the knee quad weakness still persistent concerns for quad and injury but no evidence of contusion or significant swelling around the knee    Fairly significant patellar tendon tenderness noted with palpation as well as medial bone line tenderness. NEUROLOGICALLY: There is no evidence for sensory or motor deficits in the extremity. Coordination appears full with no spacticity or rigidity. Reflexes appear to be symmetric. Skin shows no rashes/ecchymosis to the affected area, no hyperesthesias, no discoloration, no temperature or color discrepancies.      Distal circulation intact. No signs of RSD. IMPRESSION   10 week status post  left knee arthroscopy      PLAN   The patient is doing well 10 week status post leftknee arthroscopy. They will begin a general  therapy for aggressive range of motion and strengthening on her own. The patient will slowly resume normal activities. Reordered MRI of left knee for reevaluation to ensure no fracture or recurrence of meniscus tear also to rule out quadriceps tear due to significant quadriceps weakness/inhibition. And to rule out patellar tendon injury. Plan for continued work on infrapatellar tendinitis with chopat strap, local care and start of physical therapy  We have cautioned that she should be very mild and progressive with her exercise   The patient will follow up after MRI is complete    Continue use of of neuromuscular stimulator unit for muscle weakness extensor lag and quadriceps atrophy. Gave patient corticosteroid injection of the left knee at today's visit no complaints or concerns arisen. Injection Procedure Note  Procedure Details     Verbal consent was obtained for the procedure. The left knee(s) was prepped with iodine and the skin was anesthetized. Using a 22 gauge needle the left knee(s) joint is injected with 2 ml 1% lidocaine and 2 ml of triamcinolone (KENALOG) 40mg/ml under the lateral aspect of the knee. The injection site was cleansed with topical isopropyl alcohol and a dressing was applied. Complications:  None; patient tolerated the procedure well.

## 2021-08-05 ENCOUNTER — PROCEDURE VISIT (OUTPATIENT)
Dept: NEUROLOGY | Age: 56
End: 2021-08-05
Payer: COMMERCIAL

## 2021-08-05 ENCOUNTER — HOSPITAL ENCOUNTER (OUTPATIENT)
Dept: PHYSICAL THERAPY | Age: 56
Setting detail: THERAPIES SERIES
Discharge: HOME | End: 2021-08-05
Payer: COMMERCIAL

## 2021-08-05 DIAGNOSIS — R29.898 LEFT LEG WEAKNESS: Primary | ICD-10-CM

## 2021-08-05 PROCEDURE — 97110 THERAPEUTIC EXERCISES: CPT | Performed by: PHYSICAL THERAPIST

## 2021-08-05 PROCEDURE — 95908 NRV CNDJ TST 3-4 STUDIES: CPT | Performed by: PSYCHIATRY & NEUROLOGY

## 2021-08-05 PROCEDURE — 97161 PT EVAL LOW COMPLEX 20 MIN: CPT | Performed by: PHYSICAL THERAPIST

## 2021-08-05 PROCEDURE — 95886 MUSC TEST DONE W/N TEST COMP: CPT | Performed by: PSYCHIATRY & NEUROLOGY

## 2021-08-05 PROCEDURE — 97140 MANUAL THERAPY 1/> REGIONS: CPT | Performed by: PHYSICAL THERAPIST

## 2021-08-05 NOTE — PROGRESS NOTES
Yoan Latham M.D. [facility]) Physicians/[facility]  Board Certified in [address] [facility] [address] [facility], [address]    EMG / NERVE CONDUCTION STUDY      PATIENT:  Orion Cortes       DATE OF EM21     YOB: 1965       REASON FOR EMG:   Left leg weakness, patient has known diabetes      REFERRING PHYSICIAN:  Joselyn Chowdhury, [address]     SUMMARY:   The left peroneal motor nerve study was normal.  The left posterior tibial motor nerve study was not recordable. The left superficial peroneal sensory study was not recordable. Needle EMG of several muscles in the left lower extremity showed poor voluntary effort in the left lateral lateralis muscles but was otherwise normal.      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 This is probably secondary to diabetes. There was no electrophysiological evidence for focal mononeuropathy or radiculopathy in this study. ---------------------------------------------  Yoan Latham M.D.   Electromyographer / Neurologist

## 2021-08-08 NOTE — PLAN OF CARE
The [facility] and [facility]  [address], [place], [address] [facility]  Phone: [phone]   Fax:     [phone]                                                       Physical Therapy Certification    Dear Referring Practitioner: jamal rajan,    We had the pleasure of evaluating the following patient for physical therapy services at [address] [place] [address]. A summary of our findings can be found in the initial assessment below. This includes our plan of care. If you have any questions or concerns regarding these findings, please do not hesitate to contact me at the office phone number checked above. Thank you for the referral.       Physician Signature:_______________________________Date:__________________  By signing above (or electronic signature), therapists plan is approved by physician      Patient: Akosua Forde   : 1965   MRN: [id number]  Referring Physician: Referring Practitioner: Sean rajan      Evaluation Date: 2021     Medical Diagnosis Information:    M25.561 right knee pain                                             Insurance information:       Precautions/ Contra-indications:   Latex Allergy:  [x]NO      []YES  Preferred Language for Healthcare:   [x]English       []Other:  C-SSRS Triggered by Intake questionnaire (Past 2 wk assessment):   [x] No, Questionnaire did not trigger screening.   [] Yes, Patient intake triggered further evaluation      [] C-SSRS Screening completed  [] PCP notified via Plan of Care  [] Emergency services notified      SUBJECTIVE: Patient stated complaint: knee scope back in may. .. \"has not gone well. I can't bend or straighten my knee. I can't bear weight. \"    Relevant Medical History:  Arthritis         Other Medical History    Diagnosis Date Comment Source   PONV (postoperative nausea and vomiting)      Sinus congestion Functional Disability Index:  lefs - 65%     Pain Scale: 7/10  Easing factors: rest   Provocative factors:  Wbing, motion     Night Pain:    - complained of night pain associated with sleep position. Type:   - Constant          Paresthesia complaints:   - no paresthesia complaints       Functional Limitations/Impairments:   - Standing   - Walking      - Squatting/bending    - Stairs             - ADL's   - Sports/Recreation         Occupation/School:     - labor intensive occupation     Living Status/Prior Level of Function: This patient was independent in ADL's and IADL's prior to onset of symptoms. Sport/recreational activities:     - resistive training   - cardiovascular training         PACEMAKER:  - Denied having a pacemaker that would contraindicate the use of electrical modalities. METAL IMPLANTS:  - Denied metal implants that would contraindicate the use of thermal modalities. CANCER HISTORY:  - Denied a history of cancer that would contraindicate thermal modalities. OBJECTIVE:        Joint mobility:     Hypo      Palpation:     Functional Mobility/Transfers:     Posture:     Circumferential Measures: +1.5cm     ROM:  -4 to 77 degrees     Strength/Neuromuscular control:   Right knee pain:    4-/5     Bandages/Dressings/Incisions:     Gait: (include devices/WB status)    Dermatomal Sensation:  - Dermatomal sensation was intact to light touch bilaterally throughout upper and lower extremities    Deep tendon reflexes:  - DTR's were symmetrical and intact throughout. - 0 - (absent/areflexia)    - 1 - (diminished/hyporeflexia)  - 2 - (average/normal)  - 3 - (exaggerated/brisk)   - 4 - (clonus/very brisk/hyperreflexic)    Orthopedic Special Tests:                [x] Patient history, allergies, meds reviewed. Medical chart reviewed. See intake form. Review Of Systems (ROS):  [x]Performed Review of systems (Integumentary, CardioPulmonary, Neurological) by intake and observation. Intake form has been scanned into medical record. Patient has been instructed to contact their primary care physician regarding ROS issues if not already being addressed at this time. Objective Review of Systems:  Cognitive, Communication, Behavior and Learning:  - The patient was alert, spoke coherently and was oriented to person, place and time. - Demonstrated no barriers to communication or processing information.  - Appeared literate, spoke [place] and had no significant hearing or vision impairment. - Had no abnormal behavioral responses, learning preferences or educational needs.     ASSESSMENT:    The patient demonstrated at least 64% but less than 66% impairment, limitation or restriction in:   - walking and moving around (mobility)   Functional Impairments:     [x]Noted lumbar/proximal hip/LE hypomobility   [x]Decreased LE functional ROM   [x]Decreased core/proximal hip strength and neuromuscular control   [x]Decreased LE functional strength   [x]Reduced balance/proprioceptive control   []other:      Functional Activity Limitations (from functional questionnaire and intake)   [x]Reduced ability to tolerate prolonged functional positions   [x]Reduced ability or difficulty with changes of positions or transfers between positions   [x]Reduced ability to maintain good posture and demonstrate good body mechanics with sitting, bending, and lifting   [x]Reduced ability to sleep   [x] Reduced ability or tolerance with driving and/or computer work   [x]Reduced ability to perform lifting, carrying tasks   [x]Reduced ability to squat   [x]Reduced ability to forward bend   [x]Reduced ability to ambulate prolonged functional periods/distances/surfaces   [x]Reduced ability to ascend/descend stairs   [x]Reduced ability to run, hop or jump    Participation Restrictions   [x]Reduced participation in self care activities   [x]Reduced participation in home management activities   [x]Reduced participation in work activities   [x]Reduced participation in social activities. [x]Reduced participation in sport activities. Classification :    [x]Signs/symptoms consistent with post-surgical status including decreased ROM, strength and function. []Signs/symptoms consistent with joint sprain/strain   []Signs/symptoms consistent with patella-femoral syndrome   []Signs/symptoms consistent with knee OA/hip OA   []Signs/symptoms consistent with internal derangement of knee/Hip   []Signs/symptoms consistent with functional hip weakness/NMR control      []Signs/symptoms consistent with tendinitis/tendinosis    []signs/symptoms consistent with pathology which may benefit from Dry needling      []other:    Classification :   - Signs/symptoms consistent with post-surgical status including decreased ROM, strength and function. -- bony or soft tissue surgical procedure. (Pattern 4I)  - ligament or other connective tissue dysfunction. (Pattern 4D)  - localized inflammation.  (Pattern 4E)       Prognosis/Rehab Potential:       - Good        Factors affecting rehab potential:  - associated co-morbidities    Tolerance of evaluation/treatment:   - Excellent  - Good   - Fair  - Poor  Physical Therapy Evaluation Complexity Justification  [] A history of present problem with:  [] no personal factors and/or comorbidities that impact the plan of care;  []1-2 personal factors and/or comorbidities that impact the plan of care  []3 personal factors and/or comorbidities that impact the plan of care  [x] An examination of body systems using standardized tests and measures addressing any of the following: body structures and functions (impairments), activity limitations, and/or participation restrictions;:  [x] a total of 1-2 or more elements   [] a total of 3 or more elements   [] a total of 4 or more elements   [x] A clinical presentation with:  [] stable and/or uncomplicated characteristics   [x] evolving clinical presentation with changing achieved in: 2 weeks  - Independent in HEP and progression per patient tolerance, in order to prevent re-injury. - Patient will have a decrease in pain to facilitate improvement in movement, function, and ADLs as indicated by Functional Deficits. Long Term Goals: To be achieved in:12 weeks  - The patient is expected to demonstrate less than 14% impairment, limitation or restriction in:  - walking and moving around (mobility)  - Patient will demonstrate increased passive and active ROM to full, symmetrical and painfree to allow for proper joint functioning as indicated by patients Functional Deficits. - Patient will demonstrate an increase in Strength to full and painfree to resistance testing to allow for proper functional mobility as indicated by patients Functional Deficits. - Patient will return to desired, higher level,  functional activities without increased symptoms or restriction.       Electronically signed by:  Alejandra Garcia PT, MPT

## 2021-08-08 NOTE — FLOWSHEET NOTE
The [facility] Orthopaedics and Sports Rehabilitation; [place]         Physical Therapy Treatment Note/ Progress Report:           Date:  2021  Patient Name:  Mert Solomon    :  1965  MRN: [id number]  Restrictions/Precautions:    Medical/Treatment Diagnosis Information:  ·    M25.561 right knee pain   ·    Insurance/Certification information:     Physician Information:  Referring Practitioner: jamal raajn  Has the plan of care been signed (Y/N):        []  Yes  [x]  No     Date of Patient follow up with Physician:       Is this a Progress Report:     []  Yes  [x]  No        If Yes:  Date Range for reporting period:  Beginning  Ending     Progress report will be due (10 Rx or 30 days whichever is less):        Recertification will be due (POC Duration  / 90 days whichever is less):          Visit # Insurance Allowable Auth Required   1  []  Yes []  No        Functional Scale:    Date assessed:       Latex Allergy:  [x]NO      []YES  Preferred Language for Healthcare:   [x]English       []other:      Pain level:  7/10     SUBJECTIVE:  See eval    OBJECTIVE: See eval   Observation:    Test measurements:      RESTRICTIONS/PRECAUTIONS:    Exercises/Interventions:       Therapeutic Ex (28300) Sets/sec Reps Notes/CUES   Seated belt stretch   5 x 30\"     Quad sets   20x     slr's (flex, abd)   25x     Heel slides   10x     Saq/laq   25x                 Education   6'                             Manual Intervention (02713)      Prom/stm   15'                                                                                                                                             Therapeutic Exercise and NMR EXR  [x] (48397) Provided verbal/tactile cueing for activities related to strengthening, flexibility, endurance, ROM for improvements in LE, proximal hip, and core control with self care, mobility, lifting, ambulation.   [x] (97408) Provided verbal/tactile cueing (LOW) 53197 (typically 20 minutes face-to-face)  [] EVAL (MOD) 23287 (typically 30 minutes face-to-face)  [] EVAL (HIGH) 51902 (typically 45 minutes face-to-face)  [] RE-EVAL     [x] OD(87688) x 1    [] IONTO  [] NMR (17740) x     [] VASO  [x] Manual (23351) x 1    [] Other:  [] TA x      [] Mech Traction (59820)  [] ES(attended) (08835)      [] ES (un) (35260):         ASSESSMENT:  See eval      GOALS:    Patient stated goal:    [] Progressing: [] Met: [] Not Met: [] Adjusted    Therapist goals for Patient:   Short Term Goals: To be achieved in: 2 weeks  1. Independent in HEP and progression per patient tolerance, in order to prevent re-injury. [] Progressing: [] Met: [] Not Met: [] Adjusted   2. Patient will have a decrease in pain to facilitate improvement in movement, function, and ADLs as indicated by Functional Deficits. [] Progressing: [] Met: [] Not Met: [] Adjusted    Long Term Goals: To be achieved in:   12 weeks  - The patient is expected to demonstrate less than 14% impairment, limitation or restriction in:  - walking and moving around (mobility)  - Patient will demonstrate increased passive and active ROM to full, symmetrical and painfree to allow for proper joint functioning as indicated by patients Functional Deficits.    [] Progressing: [] Met: [] Not Met: [] Adjusted  - Patient will demonstrate an increase in Strength to full and painfree to resistance testing to allow for proper functional mobility as indicated by patients Functional Deficits.    [] Progressing: [] Met: [] Not Met: [] Adjusted     - Patient will return to desired, higher level,  functional activities without increased symptoms or restriction. [] Progressing: [] Met: [] Not Met: [] Adjusted        Overall Progression Towards Functional goals/ Treatment Progress Update:  [] Patient is progressing as expected towards functional goals listed. [] Progression is slowed due to complexities/Impairments listed.   [] Progression has been slowed due to co-morbidities. [x] Plan just implemented, too soon to assess goals progression <30days   [] Goals require adjustment due to lack of progress  [] Patient is not progressing as expected and requires additional follow up with physician  [] Other    Prognosis for POC: [x] Good [] Fair  [] Poor      Patient requires continued skilled intervention: [x] Yes  [] No    Treatment/Activity Tolerance:  [x] Patient able to complete treatment  [] Patient limited by fatigue  [] Patient limited by pain    [] Patient limited by other medical complications  [] Other:         Return to Play: (if applicable)   []  Stage 1: Intro to Strength   []  Stage 2: Return to Run and Strength   []  Stage 3: Return to Jump and Strength   []  Stage 4: Dynamic Strength and Agility   []  Stage 5: Sport Specific Training     []  Ready to Return to Play, Meets All Above Stages   []  Not Ready for Return to Sports   Comments:                               PLAN: See eval  [] Continue per plan of care [] Alter current plan (see comments above)  [x] Plan of care initiated [] Hold pending MD visit [] Discharge      Electronically signed by:  Deneen Arora, PT, MPT     Note: If patient does not return for scheduled/ recommended follow up visits, this note will serve as a discharge from care along with most recent update on progress.

## 2021-08-11 ENCOUNTER — HOSPITAL ENCOUNTER (OUTPATIENT)
Dept: PHYSICAL THERAPY | Age: 56
Setting detail: THERAPIES SERIES
Discharge: HOME | End: 2021-08-11
Payer: COMMERCIAL

## 2021-08-11 PROCEDURE — 97110 THERAPEUTIC EXERCISES: CPT | Performed by: PHYSICAL THERAPIST

## 2021-08-11 PROCEDURE — 97112 NEUROMUSCULAR REEDUCATION: CPT | Performed by: PHYSICAL THERAPIST

## 2021-08-11 PROCEDURE — 97140 MANUAL THERAPY 1/> REGIONS: CPT | Performed by: PHYSICAL THERAPIST

## 2021-08-13 ENCOUNTER — HOSPITAL ENCOUNTER (OUTPATIENT)
Dept: PHYSICAL THERAPY | Age: 56
Setting detail: THERAPIES SERIES
Discharge: HOME | End: 2021-08-13
Payer: COMMERCIAL

## 2021-08-13 PROCEDURE — 97110 THERAPEUTIC EXERCISES: CPT | Performed by: SPECIALIST/TECHNOLOGIST

## 2021-08-13 PROCEDURE — G0283 ELEC STIM OTHER THAN WOUND: HCPCS | Performed by: SPECIALIST/TECHNOLOGIST

## 2021-08-13 PROCEDURE — 97112 NEUROMUSCULAR REEDUCATION: CPT | Performed by: SPECIALIST/TECHNOLOGIST

## 2021-08-13 PROCEDURE — 97140 MANUAL THERAPY 1/> REGIONS: CPT | Performed by: SPECIALIST/TECHNOLOGIST

## 2021-08-13 NOTE — FLOWSHEET NOTE
The [facility] Orthopaedics and Sports Rehabilitation; [facility]         Physical Therapy Treatment Note/ Progress Report:           Date:  2021  Patient Name:  Dede Clrak    :  1965  MRN: [id number]  Restrictions/Precautions:    Medical/Treatment Diagnosis Information:  ·    M25.561 right knee pain      Insurance/Certification information:     Physician Information:     Has the plan of care been signed (Y/N):        []  Yes  [x]  No     Date of Patient follow up with Physician:       Is this a Progress Report:     []  Yes  [x]  No        If Yes:  Date Range for reporting period:  Beginning  Ending     Progress report will be due (10 Rx or 30 days whichever is less):        Recertification will be due (POC Duration  / 90 days whichever is less):          Visit # Insurance Allowable Auth Required   2  []  Yes []  No        Functional Scale:    Date assessed:       Latex Allergy:  [x]NO      []YES  Preferred Language for Healthcare:   [x]English       []other:      Pain level:  7/10     SUBJECTIVE:  \"feeling ok. .\"     OBJECTIVE: -2 to 117   Observation:    Test measurements:      RESTRICTIONS/PRECAUTIONS:    Exercises/Interventions:       Therapeutic Ex (14837) Sets/sec Reps Notes/CUES   Seated belt stretch   5 x 30\"     Quad sets   Re-ed: 20'     slr's (flex, abd)   25x     Heel slides   10x     Saq/laq   25x                 Bike   12'                             Manual Intervention (61124)      Prom/stm   15'                                                                                                                                             Therapeutic Exercise and NMR EXR  [x] (51051) Provided verbal/tactile cueing for activities related to strengthening, flexibility, endurance, ROM for improvements in LE, proximal hip, and core control with self care, mobility, lifting, ambulation.   [x] (80604) Provided verbal/tactile cueing for activities related to improving balance, coordination, kinesthetic sense, posture, motor skill, proprioception to assist with LE, proximal hip, and core control in self-care, mobility, lifting, ambulation and eccentric single leg control. NMR and Therapeutic Activities:    [x] (90592 or 95801) Provided verbal/tactile cueing for activities related to improving balance, coordination, kinesthetic sense, posture, motor skill, proprioception and motor activation to allow for proper function of core, proximal hip and LE with self-care and ADLs and functional mobility.   [] (72042) Gait Re-education- Provided training and instruction to the patient for proper LE, core and proximal hip recruitment and positioning and eccentric body weight control with ambulation re-education including up and down stairs     Home Exercise Program:    [x] (00640) Reviewed/Progressed HEP activities related to strengthening, flexibility, endurance, ROM of core, proximal hip and LE for functional self-care, mobility, lifting and ambulation/stair navigation   [] (50223) Reviewed/Progressed HEP activities related to improving balance, coordination, kinesthetic sense, posture, motor skill, proprioception of core, proximal hip and LE for self-care, mobility, lifting, and ambulation/stair navigation      Manual Treatments:  PROM / STM / Oscillations-Mobs:  G-I, II, III, IV (PA's, Inf., Post.)  [x] (85848) Provided manual therapy to mobilize LE, proximal hip and/or LS spine soft tissue/joints for the purpose of modulating pain, promoting relaxation, increasing ROM, reducing/eliminating soft tissue swelling/inflammation/restriction, improving soft tissue extensibility and allowing for proper ROM for normal function with self-care, mobility, lifting and ambulation. Modalities:     [] GAME READY (VASO)- for significant edema, swelling, pain control.      Charges:  Timed Code Treatment Minutes: 39'    Total Treatment Minutes: 39'       [] EVAL (LOW) 455 4799 (typically 20 minutes face-to-face)  [] EVAL (MOD) 68923 (typically 30 minutes face-to-face)  [] EVAL (HIGH) 88664 (typically 45 minutes face-to-face)  [] RE-EVAL     [x] CK(76076) x 1    [] IONTO  [x] NMR (12412) x  1   [] VASO  [x] Manual (53159) x 1    [] Other:  [] TA x      [] Mech Traction (31983)  [] ES(attended) (40777)      [] ES (un) (80181):         ASSESSMENT:  Tolerated well. .... GOALS:    Patient stated goal:    [] Progressing: [] Met: [] Not Met: [] Adjusted    Therapist goals for Patient:   Short Term Goals: To be achieved in: 2 weeks  1. Independent in HEP and progression per patient tolerance, in order to prevent re-injury. [] Progressing: [] Met: [] Not Met: [] Adjusted   2. Patient will have a decrease in pain to facilitate improvement in movement, function, and ADLs as indicated by Functional Deficits. [] Progressing: [] Met: [] Not Met: [] Adjusted    Long Term Goals: To be achieved in:   12 weeks  - The patient is expected to demonstrate less than 14% impairment, limitation or restriction in:  - walking and moving around (mobility)  - Patient will demonstrate increased passive and active ROM to full, symmetrical and painfree to allow for proper joint functioning as indicated by patients Functional Deficits.    [x] Progressing: [] Met: [] Not Met: [] Adjusted  - Patient will demonstrate an increase in Strength to full and painfree to resistance testing to allow for proper functional mobility as indicated by patients Functional Deficits.    [x] Progressing: [] Met: [] Not Met: [] Adjusted     - Patient will return to desired, higher level,  functional activities without increased symptoms or restriction. [x] Progressing: [] Met: [] Not Met: [] Adjusted        Overall Progression Towards Functional goals/ Treatment Progress Update:  [] Patient is progressing as expected towards functional goals listed. [] Progression is slowed due to complexities/Impairments listed.   [] Progression has been slowed due to co-morbidities. [x] Plan just implemented, too soon to assess goals progression <30days   [] Goals require adjustment due to lack of progress  [] Patient is not progressing as expected and requires additional follow up with physician  [] Other    Prognosis for POC: [x] Good [] Fair  [] Poor      Patient requires continued skilled intervention: [x] Yes  [] No    Treatment/Activity Tolerance:  [x] Patient able to complete treatment  [] Patient limited by fatigue  [] Patient limited by pain    [] Patient limited by other medical complications  [] Other:         Return to Play: (if applicable)   []  Stage 1: Intro to Strength   []  Stage 2: Return to Run and Strength   []  Stage 3: Return to Jump and Strength   []  Stage 4: Dynamic Strength and Agility   []  Stage 5: Sport Specific Training     []  Ready to Return to Play, Meets All Above Stages   []  Not Ready for Return to Sports   Comments:                               PLAN: Tolerated. .. [x] Continue per plan of care [] Alter current plan (see comments above)  [] Plan of care initiated [] Hold pending MD visit [] Discharge      Electronically signed by:  Tiki White PT, MPT     Note: If patient does not return for scheduled/ recommended follow up visits, this note will serve as a discharge from care along with most recent update on progress.

## 2021-08-13 NOTE — FLOWSHEET NOTE
[facility] Orthopaedics and Sports Rehabilitation; [facility]         Physical Therapy Treatment Note/ Progress Report:           Date:  2021  Patient Name:  Tiffanie Monroy    :  1965  MRN: [id number]  Restrictions/Precautions:    Medical/Treatment Diagnosis Information:  ·    M25.561 right knee pain      Insurance/Certification information:     Physician Information:    Dr. Aniceto Fonseca  Has the plan of care been signed (Y/N):        []  Yes  [x]  No     Date of Patient follow up with Physician:       Is this a Progress Report:     []  Yes  [x]  No        If Yes:  Date Range for reporting period:  Beginning 21  Ending     Progress report will be due (10 Rx or 30 days whichever is less):  65      Recertification will be due (POC Duration  / 90 days whichever is less):  21        Visit # Insurance Allowable Auth Required   3 Auth PIETRO - 20 visits  []  Yes []  No        Functional Scale:    Date assessed:       Latex Allergy:  [x]NO      []YES  Preferred Language for Healthcare:   [x]English       []other:      Pain level:  7/10     SUBJECTIVE:  \"I been on my feet all day at work so my knee feels painful. \"     OBJECTIVE: -2 to 117   Observation:    Test measurements:      RESTRICTIONS/PRECAUTIONS:    Exercises/Interventions:       Therapeutic Ex (93580) Sets/sec Reps Notes/CUES   Seated belt stretch   5 x 30\"     Quad sets   Re-ed: 10'     slr's (flex, abd)   2x 10 ea   Flex - min assist    Heel slides   10x10\"     Saq/laq   25x /                 Bike   5'                             Manual Intervention (16163)      Prom/stm   15'                                                                                                                                             Therapeutic Exercise and NMR EXR  [x] (82764) Provided verbal/tactile cueing for activities related to strengthening, flexibility, endurance, ROM for improvements in LE, proximal hip, and core control with self care, mobility, lifting, ambulation. [x] (90835) Provided verbal/tactile cueing for activities related to improving balance, coordination, kinesthetic sense, posture, motor skill, proprioception to assist with LE, proximal hip, and core control in self-care, mobility, lifting, ambulation and eccentric single leg control. NMR and Therapeutic Activities:    [x] (14657 or 04549) Provided verbal/tactile cueing for activities related to improving balance, coordination, kinesthetic sense, posture, motor skill, proprioception and motor activation to allow for proper function of core, proximal hip and LE with self-care and ADLs and functional mobility.   [] (47689) Gait Re-education- Provided training and instruction to the patient for proper LE, core and proximal hip recruitment and positioning and eccentric body weight control with ambulation re-education including up and down stairs     Home Exercise Program:    [x] (29231) Reviewed/Progressed HEP activities related to strengthening, flexibility, endurance, ROM of core, proximal hip and LE for functional self-care, mobility, lifting and ambulation/stair navigation   [] (33694) Reviewed/Progressed HEP activities related to improving balance, coordination, kinesthetic sense, posture, motor skill, proprioception of core, proximal hip and LE for self-care, mobility, lifting, and ambulation/stair navigation      Manual Treatments:  PROM / STM / Oscillations-Mobs:  G-I, II, III, IV (PA's, Inf., Post.)  [x] (93019) Provided manual therapy to mobilize LE, proximal hip and/or LS spine soft tissue/joints for the purpose of modulating pain, promoting relaxation, increasing ROM, reducing/eliminating soft tissue swelling/inflammation/restriction, improving soft tissue extensibility and allowing for proper ROM for normal function with self-care, mobility, lifting and ambulation.      Modalities:  EGS + CP 15'   [] GAME READY (VASO)- for significant edema, swelling, pain control. Charges:  Timed Code Treatment Minutes: 45'    Total Treatment Minutes: 39'       [] EVAL (LOW) 26246 (typically 20 minutes face-to-face)  [] EVAL (MOD) 31087 (typically 30 minutes face-to-face)  [] EVAL (HIGH) 74687 (typically 45 minutes face-to-face)  [] RE-EVAL     [x] BC(61336) x 1    [] IONTO  [x] NMR (00049) x  1   [] VASO  [x] Manual (04428) x 1    [] Other:  [] TA x      [] Mech Traction (16874)  [] ES(attended) (18042)      [x] ES (un) (42241):         ASSESSMENT:  PROM is progressing well. Pt. Require min assist with SLR flexion. Quad fatigue quickly with exercises. Pt. Require multiple cues with exercises to decrease co-contraction of HS / Anterior tib. With QS and SLR flexion. GOALS:    Patient stated goal:    [] Progressing: [] Met: [] Not Met: [] Adjusted    Therapist goals for Patient:   Short Term Goals: To be achieved in: 2 weeks  1. Independent in HEP and progression per patient tolerance, in order to prevent re-injury. [] Progressing: [] Met: [] Not Met: [] Adjusted   2. Patient will have a decrease in pain to facilitate improvement in movement, function, and ADLs as indicated by Functional Deficits. [] Progressing: [] Met: [] Not Met: [] Adjusted    Long Term Goals:  To be achieved in:   12 weeks  - The patient is expected to demonstrate less than 14% impairment, limitation or restriction in:  - walking and moving around (mobility)  - Patient will demonstrate increased passive and active ROM to full, symmetrical and painfree to allow for proper joint functioning as indicated by patients Functional Deficits.    [x] Progressing: [] Met: [] Not Met: [] Adjusted  - Patient will demonstrate an increase in Strength to full and painfree to resistance testing to allow for proper functional mobility as indicated by patients Functional Deficits.    [x] Progressing: [] Met: [] Not Met: [] Adjusted     - Patient will return to desired, higher level,  functional

## 2021-08-16 ENCOUNTER — HOSPITAL ENCOUNTER (OUTPATIENT)
Dept: PHYSICAL THERAPY | Age: 56
Setting detail: THERAPIES SERIES
Discharge: HOME | End: 2021-08-16
Payer: COMMERCIAL

## 2021-08-16 PROCEDURE — 97140 MANUAL THERAPY 1/> REGIONS: CPT | Performed by: PHYSICAL THERAPIST

## 2021-08-16 PROCEDURE — 97112 NEUROMUSCULAR REEDUCATION: CPT | Performed by: PHYSICAL THERAPIST

## 2021-08-16 PROCEDURE — 97110 THERAPEUTIC EXERCISES: CPT | Performed by: PHYSICAL THERAPIST

## 2021-08-17 NOTE — FLOWSHEET NOTE
[facility]; Orthopaedics and Sports Rehabilitation; [facility]         Physical Therapy Treatment Note/ Progress Report:           Date:  2021  Patient Name:  Mert Solomon    :  1965  MRN: [id number]  Restrictions/Precautions:    Medical/Treatment Diagnosis Information:  ·    M25.561 right knee pain      Insurance/Certification information:     Physician Information:    Dr. Lund Ax  Has the plan of care been signed (Y/N):        []  Yes  [x]  No     Date of Patient follow up with Physician:       Is this a Progress Report:     []  Yes  [x]  No        If Yes:  Date Range for reporting period:  Beginning 21  Ending     Progress report will be due (10 Rx or 30 days whichever is less):  4/3/24      Recertification will be due (POC Duration  / 90 days whichever is less):  21        Visit # Insurance Allowable Auth Required   4 Auth PIETRO - 20 visits  []  Yes []  No        Functional Scale:    Date assessed:       Latex Allergy:  [x]NO      []YES  Preferred Language for Healthcare:   [x]English       []other:      Pain level:  7/10     SUBJECTIVE:  \"I am still having soreness. .but at least it's moving better. Laura Moreland \"     OBJECTIVE: -1 to 120   Observation:    Test measurements:      RESTRICTIONS/PRECAUTIONS:    Exercises/Interventions:       Therapeutic Ex (62012) Sets/sec Reps Notes/CUES   Seated belt stretch   5 x 30\"     Quad sets   40x     slr's (flex, abd)   2x 10 ea   Flex - min assist    Heel slides   10x10\"     Saq/laq   25x / 25x     Ext prop with ice   10'           Bike   8'                             Manual Intervention (09888)      Prom/stm   15'                                                                                                                                             Therapeutic Exercise and NMR EXR  [x] (23675) Provided verbal/tactile cueing for activities related to strengthening, flexibility, endurance, ROM for improvements in LE, proximal hip, and core control with self care, mobility, lifting, ambulation. [x] (92899) Provided verbal/tactile cueing for activities related to improving balance, coordination, kinesthetic sense, posture, motor skill, proprioception to assist with LE, proximal hip, and core control in self-care, mobility, lifting, ambulation and eccentric single leg control. NMR and Therapeutic Activities:    [x] (46657 or 04127) Provided verbal/tactile cueing for activities related to improving balance, coordination, kinesthetic sense, posture, motor skill, proprioception and motor activation to allow for proper function of core, proximal hip and LE with self-care and ADLs and functional mobility.   [] (73955) Gait Re-education- Provided training and instruction to the patient for proper LE, core and proximal hip recruitment and positioning and eccentric body weight control with ambulation re-education including up and down stairs     Home Exercise Program:    [x] (47830) Reviewed/Progressed HEP activities related to strengthening, flexibility, endurance, ROM of core, proximal hip and LE for functional self-care, mobility, lifting and ambulation/stair navigation   [] (86782) Reviewed/Progressed HEP activities related to improving balance, coordination, kinesthetic sense, posture, motor skill, proprioception of core, proximal hip and LE for self-care, mobility, lifting, and ambulation/stair navigation      Manual Treatments:  PROM / STM / Oscillations-Mobs:  G-I, II, III, IV (PA's, Inf., Post.)  [x] (96422) Provided manual therapy to mobilize LE, proximal hip and/or LS spine soft tissue/joints for the purpose of modulating pain, promoting relaxation, increasing ROM, reducing/eliminating soft tissue swelling/inflammation/restriction, improving soft tissue extensibility and allowing for proper ROM for normal function with self-care, mobility, lifting and ambulation.      Modalities:  EGS + CP 15'   [] GAME READY (VASO)- for significant edema, swelling, pain control. Charges:  Timed Code Treatment Minutes: 45'    Total Treatment Minutes: 39'       [] EVAL (LOW) 12289 (typically 20 minutes face-to-face)  [] EVAL (MOD) 17375 (typically 30 minutes face-to-face)  [] EVAL (HIGH) 11738 (typically 45 minutes face-to-face)  [] RE-EVAL     [x] WG(33490) x 1    [] IONTO  [x] NMR (27952) x  1   [] VASO  [x] Manual (30729) x 1    [] Other:  [] TA x      [] Mech Traction (19619)  [] ES(attended) (94522)      [x] ES (un) (50764):         ASSESSMENT:  PROM is progressing well. Pt. Require min assist with SLR flexion. Quad fatigue quickly with exercises. Pt. Require multiple cues with exercises to decrease co-contraction of HS / Anterior tib. With QS and SLR flexion. GOALS:    Patient stated goal:    [] Progressing: [] Met: [] Not Met: [] Adjusted    Therapist goals for Patient:   Short Term Goals: To be achieved in: 2 weeks  1. Independent in HEP and progression per patient tolerance, in order to prevent re-injury. [x] Progressing: [] Met: [] Not Met: [] Adjusted   2. Patient will have a decrease in pain to facilitate improvement in movement, function, and ADLs as indicated by Functional Deficits. [x] Progressing: [] Met: [] Not Met: [] Adjusted    Long Term Goals:  To be achieved in:   12 weeks  - The patient is expected to demonstrate less than 14% impairment, limitation or restriction in:  - walking and moving around (mobility)  - Patient will demonstrate increased passive and active ROM to full, symmetrical and painfree to allow for proper joint functioning as indicated by patients Functional Deficits.    [x] Progressing: [] Met: [] Not Met: [] Adjusted  - Patient will demonstrate an increase in Strength to full and painfree to resistance testing to allow for proper functional mobility as indicated by patients Functional Deficits.    [x] Progressing: [] Met: [] Not Met: [] Adjusted     - Patient will return to desired, higher level,  functional activities without increased symptoms or restriction. [x] Progressing: [] Met: [] Not Met: [] Adjusted        Overall Progression Towards Functional goals/ Treatment Progress Update:  [] Patient is progressing as expected towards functional goals listed. [] Progression is slowed due to complexities/Impairments listed. [] Progression has been slowed due to co-morbidities. [x] Plan just implemented, too soon to assess goals progression <30days   [] Goals require adjustment due to lack of progress  [] Patient is not progressing as expected and requires additional follow up with physician  [] Other    Prognosis for POC: [x] Good [] Fair  [] Poor      Patient requires continued skilled intervention: [x] Yes  [] No    Treatment/Activity Tolerance:  [x] Patient able to complete treatment  [] Patient limited by fatigue  [] Patient limited by pain    [] Patient limited by other medical complications  [] Other:         Return to Play: (if applicable)   []  Stage 1: Intro to Strength   []  Stage 2: Return to Run and Strength   []  Stage 3: Return to Jump and Strength   []  Stage 4: Dynamic Strength and Agility   []  Stage 5: Sport Specific Training     []  Ready to Return to Play, Meets All Above Stages   []  Not Ready for Return to Sports   Comments:                               PLAN: Tolerated. .. [x] Continue per plan of care [] Alter current plan (see comments above)  [] Plan of care initiated [] Hold pending MD visit [] Discharge      Electronically signed by:  Lon Sosa PT, MPT      Note: If patient does not return for scheduled/ recommended follow up visits, this note will serve as a discharge from care along with most recent update on progress.

## 2021-08-18 ENCOUNTER — HOSPITAL ENCOUNTER (OUTPATIENT)
Dept: PHYSICAL THERAPY | Age: 56
Setting detail: THERAPIES SERIES
Discharge: HOME | End: 2021-08-18
Payer: COMMERCIAL

## 2021-08-18 PROCEDURE — G0283 ELEC STIM OTHER THAN WOUND: HCPCS | Performed by: SPECIALIST/TECHNOLOGIST

## 2021-08-18 PROCEDURE — 97110 THERAPEUTIC EXERCISES: CPT | Performed by: SPECIALIST/TECHNOLOGIST

## 2021-08-18 NOTE — FLOWSHEET NOTE
[facility] Orthopaedics and Sports Rehabilitation; [facility]         Physical Therapy Treatment Note/ Progress Report:           Date:  2021  Patient Name:  Laquita Mcadams    :  1965  MRN: [id number]  Restrictions/Precautions:    Medical/Treatment Diagnosis Information:  ·    M25.561 left knee pain      Insurance/Certification information:     Physician Information:    Dr. Vivi Cartwright  Has the plan of care been signed (Y/N):        []  Yes  [x]  No     Date of Patient follow up with Physician:       Is this a Progress Report:     []  Yes  [x]  No        If Yes:  Date Range for reporting period:  Beginning 21  Ending     Progress report will be due (10 Rx or 30 days whichever is less):        Recertification will be due (POC Duration  / 90 days whichever is less):  21        Visit # Insurance Allowable Auth Required   5 Auth PIETRO - 20 visits  []  Yes []  No        Functional Scale:    Date assessed:       Latex Allergy:  [x]NO      []YES  Preferred Language for Healthcare:   [x]English       []other:      Pain level:  nr/10     SUBJECTIVE:  \"I am walking better bc I am not limping as much as I use too. \"     OBJECTIVE: -1 to 120   Observation:    Test measurements:      RESTRICTIONS/PRECAUTIONS:    Exercises/Interventions:       Therapeutic Ex (18074) Sets/sec Reps Notes/CUES   slant belt stretch   3 x 30\"     Quad sets   30 x 3\"     slr's (flex, abd)   2x 10 ea   Flex - min assist    Heel slides + SB  10x10\" + Strap     Saq/laq   25x / 25x     Ext prop with ice   10'                 Bike   8'           Leg press  60# 2 x 10     FSU  4' x 15 TC   [facility] TKE  45# 30 x 3\"                      Manual Intervention (61468)      Prom/stm   '                                                                                                                                             Therapeutic Exercise and NMR EXR  [x] (32901) Provided verbal/tactile cueing for activities related to strengthening, flexibility, endurance, ROM for improvements in LE, proximal hip, and core control with self care, mobility, lifting, ambulation. [x] (23537) Provided verbal/tactile cueing for activities related to improving balance, coordination, kinesthetic sense, posture, motor skill, proprioception to assist with LE, proximal hip, and core control in self-care, mobility, lifting, ambulation and eccentric single leg control.      NMR and Therapeutic Activities:    [x] (37720 or 57401) Provided verbal/tactile cueing for activities related to improving balance, coordination, kinesthetic sense, posture, motor skill, proprioception and motor activation to allow for proper function of core, proximal hip and LE with self-care and ADLs and functional mobility.   [] (06337) Gait Re-education- Provided training and instruction to the patient for proper LE, core and proximal hip recruitment and positioning and eccentric body weight control with ambulation re-education including up and down stairs     Home Exercise Program:    [x] (91816) Reviewed/Progressed HEP activities related to strengthening, flexibility, endurance, ROM of core, proximal hip and LE for functional self-care, mobility, lifting and ambulation/stair navigation   [] (38112) Reviewed/Progressed HEP activities related to improving balance, coordination, kinesthetic sense, posture, motor skill, proprioception of core, proximal hip and LE for self-care, mobility, lifting, and ambulation/stair navigation      Manual Treatments:  PROM / STM / Oscillations-Mobs:  G-I, II, III, IV (PA's, Inf., Post.)  [] (74649) Provided manual therapy to mobilize LE, proximal hip and/or LS spine soft tissue/joints for the purpose of modulating pain, promoting relaxation, increasing ROM, reducing/eliminating soft tissue swelling/inflammation/restriction, improving soft tissue extensibility and allowing for proper ROM for normal function with self-care, mobility, lifting and ambulation. Modalities:  EGS + CP 15'   [] GAME READY (VASO)- for significant edema, swelling, pain control. Charges:  Timed Code Treatment Minutes: 25'    Total Treatment Minutes: 61'     2 pt.'s at 1 time  [] EVAL (LOW) 20152 (typically 20 minutes face-to-face)  [] EVAL (MOD) 18393 (typically 30 minutes face-to-face)  [] EVAL (HIGH) 26304 (typically 45 minutes face-to-face)  [] RE-EVAL     [x] CS(73065) x 2    [] IONTO  [] NMR (87547) x     [] VASO  [] Manual (95286) x     [] Other:  [] TA x      [] Mech Traction (03132)  [] ES(attended) (10556)      [x] ES (un) (86723):         ASSESSMENT:  PROM is progressing well. Quad fatigue quickly with exercises. Pt. Require multiple cues with exercises to decrease co-contraction of HS / Anterior tib. With QS and SLR flexion. GOALS:    Patient stated goal:    [] Progressing: [] Met: [] Not Met: [] Adjusted    Therapist goals for Patient:   Short Term Goals: To be achieved in: 2 weeks  1. Independent in HEP and progression per patient tolerance, in order to prevent re-injury. [x] Progressing: [] Met: [] Not Met: [] Adjusted   2. Patient will have a decrease in pain to facilitate improvement in movement, function, and ADLs as indicated by Functional Deficits. [x] Progressing: [] Met: [] Not Met: [] Adjusted    Long Term Goals:  To be achieved in:   12 weeks  - The patient is expected to demonstrate less than 14% impairment, limitation or restriction in:  - walking and moving around (mobility)  - Patient will demonstrate increased passive and active ROM to full, symmetrical and painfree to allow for proper joint functioning as indicated by patients Functional Deficits.    [x] Progressing: [] Met: [] Not Met: [] Adjusted  - Patient will demonstrate an increase in Strength to full and painfree to resistance testing to allow for proper functional mobility as indicated by patients Functional Deficits.    [x] Progressing: [] Met: [] Not Met: [] Adjusted     - Patient will return to desired, higher level,  functional activities without increased symptoms or restriction. [x] Progressing: [] Met: [] Not Met: [] Adjusted        Overall Progression Towards Functional goals/ Treatment Progress Update:  [] Patient is progressing as expected towards functional goals listed. [] Progression is slowed due to complexities/Impairments listed. [] Progression has been slowed due to co-morbidities. [x] Plan just implemented, too soon to assess goals progression <30days   [] Goals require adjustment due to lack of progress  [] Patient is not progressing as expected and requires additional follow up with physician  [] Other    Prognosis for POC: [x] Good [] Fair  [] Poor      Patient requires continued skilled intervention: [x] Yes  [] No    Treatment/Activity Tolerance:  [x] Patient able to complete treatment  [] Patient limited by fatigue  [] Patient limited by pain    [] Patient limited by other medical complications  [] Other:         Return to Play: (if applicable)   []  Stage 1: Intro to Strength   []  Stage 2: Return to Run and Strength   []  Stage 3: Return to Jump and Strength   []  Stage 4: Dynamic Strength and Agility   []  Stage 5: Sport Specific Training     []  Ready to Return to Play, Meets All Above Stages   []  Not Ready for Return to Sports   Comments:                               PLAN: Tolerated. .. [x] Continue per plan of care [] Alter current plan (see comments above)  [] Plan of care initiated [] Hold pending MD visit [] Discharge      Electronically signed by:  Sally Harada, PTA  [id number], Ina Fernandes, PT, MPT      Note: If patient does not return for scheduled/ recommended follow up visits, this note will serve as a discharge from care along with most recent update on progress.

## 2021-08-19 ENCOUNTER — TELEPHONE (OUTPATIENT)
Dept: ORTHOPEDIC SURGERY | Age: 56
End: 2021-08-19

## 2021-08-19 NOTE — TELEPHONE ENCOUNTER
LM: Her MRI has been denied. They want her to do 6 weeks of conservative treatment. She is already in PT, so we continue that for 6 weeks and tehn make an appointment after that.

## 2021-08-25 ENCOUNTER — HOSPITAL ENCOUNTER (OUTPATIENT)
Dept: PHYSICAL THERAPY | Age: 56
Setting detail: THERAPIES SERIES
Discharge: HOME | End: 2021-08-25
Payer: COMMERCIAL

## 2021-08-25 PROCEDURE — 97112 NEUROMUSCULAR REEDUCATION: CPT | Performed by: SPECIALIST/TECHNOLOGIST

## 2021-08-25 PROCEDURE — 97110 THERAPEUTIC EXERCISES: CPT | Performed by: SPECIALIST/TECHNOLOGIST

## 2021-08-25 NOTE — FLOWSHEET NOTE
[facility] Orthopaedics and Sports Rehabilitation; [employer]         Physical Therapy Treatment Note/ Progress Report:           Date:  2021  Patient Name:  Liuz Rascon    :  1965  MRN: [id number]  Restrictions/Precautions:    Medical/Treatment Diagnosis Information:  ·    M25.561 left knee pain      Insurance/Certification information:     Physician Information:    Dr. Lianet Chapman  Has the plan of care been signed (Y/N):        []  Yes  [x]  No     Date of Patient follow up with Physician:       Is this a Progress Report:     []  Yes  [x]  No        If Yes:  Date Range for reporting period:  Beginning 21  Ending     Progress report will be due (10 Rx or 30 days whichever is less):        Recertification will be due (POC Duration  / 90 days whichever is less):  21        Visit # Insurance Allowable Auth Required   6 Auth PIETRO - 20 visits  []  Yes []  No        Functional Scale:    Date assessed:       Latex Allergy:  [x]NO      []YES  Preferred Language for Healthcare:   [x]English       []other:      Pain level:  nr/10     SUBJECTIVE:  \"I am slowly getting better. Stairs are still challenging for me. \"     OBJECTIVE: -1 to 120   Observation:    Test measurements:      RESTRICTIONS/PRECAUTIONS:    Exercises/Interventions:       Therapeutic Ex (48343) Sets/sec Reps Notes/CUES   slant  stretch   3 x 30\"     Quad sets       slr's (flex, abd)   2x 10 ea      Heel slides + SB      Saq/laq   30x / 30x     Ext prop with ice   10'                 Bike   5'           Leg press  60# 3 x 10     FSU  4' x 15 TC   Step up and over  4' x 15 TC    JESSICA TKE  abd  45# 30 x 3\"  45# x 20 B     SLB   3 x 15\" HHA               Manual Intervention (00074)      Prom/stm   '                                                                                                                                             Therapeutic Exercise and NMR EXR  [x] (05313) Provided verbal/tactile cueing for activities related to strengthening, flexibility, endurance, ROM for improvements in LE, proximal hip, and core control with self care, mobility, lifting, ambulation. [x] (93304) Provided verbal/tactile cueing for activities related to improving balance, coordination, kinesthetic sense, posture, motor skill, proprioception to assist with LE, proximal hip, and core control in self-care, mobility, lifting, ambulation and eccentric single leg control.      NMR and Therapeutic Activities:    [x] (98900 or 16523) Provided verbal/tactile cueing for activities related to improving balance, coordination, kinesthetic sense, posture, motor skill, proprioception and motor activation to allow for proper function of core, proximal hip and LE with self-care and ADLs and functional mobility.   [] (33128) Gait Re-education- Provided training and instruction to the patient for proper LE, core and proximal hip recruitment and positioning and eccentric body weight control with ambulation re-education including up and down stairs     Home Exercise Program:    [x] (02565) Reviewed/Progressed HEP activities related to strengthening, flexibility, endurance, ROM of core, proximal hip and LE for functional self-care, mobility, lifting and ambulation/stair navigation   [] (59501) Reviewed/Progressed HEP activities related to improving balance, coordination, kinesthetic sense, posture, motor skill, proprioception of core, proximal hip and LE for self-care, mobility, lifting, and ambulation/stair navigation      Manual Treatments:  PROM / STM / Oscillations-Mobs:  G-I, II, III, IV (PA's, Inf., Post.)  [] (63512) Provided manual therapy to mobilize LE, proximal hip and/or LS spine soft tissue/joints for the purpose of modulating pain, promoting relaxation, increasing ROM, reducing/eliminating soft tissue swelling/inflammation/restriction, improving soft tissue extensibility and allowing for proper ROM for normal function with self-care, mobility, lifting and ambulation. Modalities:  + CP 15'   [] GAME READY (VASO)- for significant edema, swelling, pain control. Charges:  Timed Code Treatment Minutes: 40'    Total Treatment Minutes: 36'       [] EVAL (LOW) 86133 (typically 20 minutes face-to-face)  [] EVAL (MOD) 19772 (typically 30 minutes face-to-face)  [] EVAL (HIGH) 66355 (typically 45 minutes face-to-face)  [] RE-EVAL     [x] NU(82318) x 2    [] IONTO  [x] NMR (24320) x 1     [] VASO  [] Manual (57872) x     [] Other:  [] TA x      [] Mech Traction (79129)  [] ES(attended) (27793)      [] ES (un) (89156):         ASSESSMENT:  ROM and LLE strengthening is progressing well. Quad fatigue quickly with exercises. Independent SLR flexion with minimal to no lag. GOALS:    Patient stated goal:    [] Progressing: [] Met: [] Not Met: [] Adjusted    Therapist goals for Patient:   Short Term Goals: To be achieved in: 2 weeks  1. Independent in HEP and progression per patient tolerance, in order to prevent re-injury. [x] Progressing: [] Met: [] Not Met: [] Adjusted   2. Patient will have a decrease in pain to facilitate improvement in movement, function, and ADLs as indicated by Functional Deficits. [x] Progressing: [] Met: [] Not Met: [] Adjusted    Long Term Goals:  To be achieved in:   12 weeks  - The patient is expected to demonstrate less than 14% impairment, limitation or restriction in:  - walking and moving around (mobility)  - Patient will demonstrate increased passive and active ROM to full, symmetrical and painfree to allow for proper joint functioning as indicated by patients Functional Deficits.    [x] Progressing: [] Met: [] Not Met: [] Adjusted  - Patient will demonstrate an increase in Strength to full and painfree to resistance testing to allow for proper functional mobility as indicated by patients Functional Deficits.    [x] Progressing: [] Met: [] Not Met: [] Adjusted     - Patient will return to desired, higher level,  functional activities without increased symptoms or restriction. [x] Progressing: [] Met: [] Not Met: [] Adjusted        Overall Progression Towards Functional goals/ Treatment Progress Update:  [] Patient is progressing as expected towards functional goals listed. [] Progression is slowed due to complexities/Impairments listed. [] Progression has been slowed due to co-morbidities. [x] Plan just implemented, too soon to assess goals progression <30days   [] Goals require adjustment due to lack of progress  [] Patient is not progressing as expected and requires additional follow up with physician  [] Other    Prognosis for POC: [x] Good [] Fair  [] Poor      Patient requires continued skilled intervention: [x] Yes  [] No    Treatment/Activity Tolerance:  [x] Patient able to complete treatment  [] Patient limited by fatigue  [] Patient limited by pain    [] Patient limited by other medical complications  [] Other:         Return to Play: (if applicable)   []  Stage 1: Intro to Strength   []  Stage 2: Return to Run and Strength   []  Stage 3: Return to Jump and Strength   []  Stage 4: Dynamic Strength and Agility   []  Stage 5: Sport Specific Training     []  Ready to Return to Play, Meets All Above Stages   []  Not Ready for Return to Sports   Comments:                               PLAN: Tolerated. .. [x] Continue per plan of care [] Alter current plan (see comments above)  [] Plan of care initiated [] Hold pending MD visit [] Discharge      Electronically signed by:  Tracy Diop, PTA  [id number], Hank Deng, PT, MPT      Note: If patient does not return for scheduled/ recommended follow up visits, this note will serve as a discharge from care along with most recent update on progress.

## 2021-08-27 ENCOUNTER — HOSPITAL ENCOUNTER (OUTPATIENT)
Dept: PHYSICAL THERAPY | Age: 56
Setting detail: THERAPIES SERIES
Discharge: HOME | End: 2021-08-27
Payer: COMMERCIAL

## 2021-08-27 PROCEDURE — 97110 THERAPEUTIC EXERCISES: CPT | Performed by: SPECIALIST/TECHNOLOGIST

## 2021-08-27 PROCEDURE — 97112 NEUROMUSCULAR REEDUCATION: CPT | Performed by: SPECIALIST/TECHNOLOGIST

## 2021-08-27 NOTE — FLOWSHEET NOTE
[facility] Orthopaedics and Sports Rehabilitation; [facility]         Physical Therapy Treatment Note/ Progress Report:           Date:  2021  Patient Name:  Delmy Chau    :  1965  MRN: [id number]  Restrictions/Precautions:    Medical/Treatment Diagnosis Information:  ·    M25.561 left knee pain      Insurance/Certification information:     Physician Information:    Dr. Harvey Mention  Has the plan of care been signed (Y/N):        []  Yes  [x]  No     Date of Patient follow up with Physician:       Is this a Progress Report:     []  Yes  [x]  No        If Yes:  Date Range for reporting period:  Beginning 21  Ending     Progress report will be due (10 Rx or 30 days whichever is less):  08      Recertification will be due (POC Duration  / 90 days whichever is less):  21        Visit # Insurance Allowable Auth Required   7   Auth PIETRO -  visits    - 10/19 8 visits []  Yes []  No        Functional Scale:    Date assessed:       Latex Allergy:  [x]NO      []YES  Preferred Language for Healthcare:   [x]English       []other:      Pain level:  nr/10     SUBJECTIVE:  \"Stairs are still challenging for me. I have more knee tightness than pain at this time. \"     OBJECTIVE: -1 to 120   Observation:    Test measurements:      RESTRICTIONS/PRECAUTIONS:    Exercises/Interventions:       Therapeutic Ex (18546) Sets/sec Reps Notes/CUES   slant  stretch   3 x 30\"     Quad sets       slr's (flex, abd)   3x 10 ea      Heel slides + SB      Saq/laq   30x /      Ext prop with ice   10'     bridge  X 15          Bike   5'           Leg press  60# 3 x 10     FSU  6' x 15 TC   Step up and over  4' x 15 TC    [facility] TKE  abd  45# 30 x 3\"  45# x 20 B     SLB   3 x 15\" HHA               Manual Intervention (26206)      Prom/stm   ' Therapeutic Exercise and NMR EXR  [x] (07447) Provided verbal/tactile cueing for activities related to strengthening, flexibility, endurance, ROM for improvements in LE, proximal hip, and core control with self care, mobility, lifting, ambulation. [x] (72524) Provided verbal/tactile cueing for activities related to improving balance, coordination, kinesthetic sense, posture, motor skill, proprioception to assist with LE, proximal hip, and core control in self-care, mobility, lifting, ambulation and eccentric single leg control.      NMR and Therapeutic Activities:    [x] (96134 or 81400) Provided verbal/tactile cueing for activities related to improving balance, coordination, kinesthetic sense, posture, motor skill, proprioception and motor activation to allow for proper function of core, proximal hip and LE with self-care and ADLs and functional mobility.   [] (90977) Gait Re-education- Provided training and instruction to the patient for proper LE, core and proximal hip recruitment and positioning and eccentric body weight control with ambulation re-education including up and down stairs     Home Exercise Program:    [x] (18957) Reviewed/Progressed HEP activities related to strengthening, flexibility, endurance, ROM of core, proximal hip and LE for functional self-care, mobility, lifting and ambulation/stair navigation   [] (30237) Reviewed/Progressed HEP activities related to improving balance, coordination, kinesthetic sense, posture, motor skill, proprioception of core, proximal hip and LE for self-care, mobility, lifting, and ambulation/stair navigation      Manual Treatments:  PROM / STM / Oscillations-Mobs:  G-I, II, III, IV (PA's, Inf., Post.)  [] (68009) Provided manual therapy to mobilize LE, proximal hip and/or LS spine soft tissue/joints for the purpose of modulating pain, promoting relaxation, increasing ROM, reducing/eliminating soft tissue swelling/inflammation/restriction, improving soft tissue extensibility and allowing for proper ROM for normal function with self-care, mobility, lifting and ambulation. Modalities:  + CP 15'   [] GAME READY (VASO)- for significant edema, swelling, pain control. Charges:  Timed Code Treatment Minutes: 40'    Total Treatment Minutes: 36'       [] EVAL (LOW) 07665 (typically 20 minutes face-to-face)  [] EVAL (MOD) 15112 (typically 30 minutes face-to-face)  [] EVAL (HIGH) 70171 (typically 45 minutes face-to-face)  [] RE-EVAL     [x] TG(24947) x 2    [] IONTO  [x] NMR (57163) x 1     [] VASO  [] Manual (74518) x     [] Other:  [] TA x      [] Mech Traction (98934)  [] ES(attended) (82961)      [] ES (un) (53894):         ASSESSMENT:  ROM and LLE strengthening is progressing well. Quad fatigue quickly with exercises. Independent SLR flexion with minimal to no lag. GOALS:    Patient stated goal:    [] Progressing: [] Met: [] Not Met: [] Adjusted    Therapist goals for Patient:   Short Term Goals: To be achieved in: 2 weeks  1. Independent in HEP and progression per patient tolerance, in order to prevent re-injury. [x] Progressing: [] Met: [] Not Met: [] Adjusted   2. Patient will have a decrease in pain to facilitate improvement in movement, function, and ADLs as indicated by Functional Deficits. [x] Progressing: [] Met: [] Not Met: [] Adjusted    Long Term Goals:  To be achieved in:   12 weeks  - The patient is expected to demonstrate less than 14% impairment, limitation or restriction in:  - walking and moving around (mobility)  - Patient will demonstrate increased passive and active ROM to full, symmetrical and painfree to allow for proper joint functioning as indicated by patients Functional Deficits.    [x] Progressing: [] Met: [] Not Met: [] Adjusted  - Patient will demonstrate an increase in Strength to full and painfree to resistance testing to allow for proper functional mobility as indicated by patients Functional Deficits.    [x] Progressing: [] Met: [] Not Met: [] Adjusted     - Patient will return to desired, higher level,  functional activities without increased symptoms or restriction. [x] Progressing: [] Met: [] Not Met: [] Adjusted        Overall Progression Towards Functional goals/ Treatment Progress Update:  [] Patient is progressing as expected towards functional goals listed. [] Progression is slowed due to complexities/Impairments listed. [] Progression has been slowed due to co-morbidities. [x] Plan just implemented, too soon to assess goals progression <30days   [] Goals require adjustment due to lack of progress  [] Patient is not progressing as expected and requires additional follow up with physician  [] Other    Prognosis for POC: [x] Good [] Fair  [] Poor      Patient requires continued skilled intervention: [x] Yes  [] No    Treatment/Activity Tolerance:  [x] Patient able to complete treatment  [] Patient limited by fatigue  [] Patient limited by pain    [] Patient limited by other medical complications  [] Other:         Return to Play: (if applicable)   []  Stage 1: Intro to Strength   []  Stage 2: Return to Run and Strength   []  Stage 3: Return to Jump and Strength   []  Stage 4: Dynamic Strength and Agility   []  Stage 5: Sport Specific Training     []  Ready to Return to Play, Meets All Above Stages   []  Not Ready for Return to Sports   Comments:                               PLAN: Tolerated. .. [x] Continue per plan of care [] Alter current plan (see comments above)  [] Plan of care initiated [] Hold pending MD visit [] Discharge      Electronically signed by:  Cinthia Knapp, PTA  [id number], Etta Mcleod PT, MPT      Note: If patient does not return for scheduled/ recommended follow up visits, this note will serve as a discharge from care along with most recent update on progress.

## 2021-08-31 ENCOUNTER — HOSPITAL ENCOUNTER (OUTPATIENT)
Dept: PHYSICAL THERAPY | Age: 56
Setting detail: THERAPIES SERIES
Discharge: HOME | End: 2021-08-31
Payer: COMMERCIAL

## 2021-08-31 PROCEDURE — 97112 NEUROMUSCULAR REEDUCATION: CPT | Performed by: PHYSICAL THERAPIST

## 2021-08-31 PROCEDURE — 97110 THERAPEUTIC EXERCISES: CPT | Performed by: PHYSICAL THERAPIST

## 2021-08-31 NOTE — FLOWSHEET NOTE
[facility] Orthopaedics and Sports Rehabilitation; [facility]         Physical Therapy Treatment Note/ Progress Report:           Date:  2021  Patient Name:  Agnieszka Lerma    :  1965  MRN: [id number]  Restrictions/Precautions:    Medical/Treatment Diagnosis Information:  ·    M25.561 left knee pain      Insurance/Certification information:     Physician Information:    Dr. Richard Rojas  Has the plan of care been signed (Y/N):        []  Yes  [x]  No     Date of Patient follow up with Physician:       Is this a Progress Report:     []  Yes  [x]  No        If Yes:  Date Range for reporting period:  Beginning 21  Ending     Progress report will be due (10 Rx or 30 days whichever is less):  65      Recertification will be due (POC Duration  / 90 days whichever is less):  21        Visit # Insurance Allowable Auth Required   8  3/8 Auth PIETRO -  visits    - 10/19 8 visits []  Yes []  No        Functional Scale:    Date assessed:       Latex Allergy:  [x]NO      []YES  Preferred Language for Healthcare:   [x]English       []other:      Pain level:  nr/10     SUBJECTIVE:  States the knee is doing well today.      OBJECTIVE: -1 to 120   Observation:    Test measurements:      RESTRICTIONS/PRECAUTIONS:    Exercises/Interventions:       Therapeutic Ex (28306) Sets/sec Reps Notes/CUES   slant  stretch   3 x 30\"     Quad sets       slr's (flex, abd)   3x 10 ea      Heel slides + SB      Saq/laq   30x /      Ext prop with ice   10'     bridge  X 15    clamshells  30x    Bike   5'           Leg press  80# 2 x 10     FSU  6' x 15 TC   Step up and over   held   [facility] TKE  abd  60# 30 x 3\"  45# x 20 B     SLB   3 x 15\" HHA   LSD  2'' 10x          Manual Intervention (11216)      Prom/stm   '                                                                                                                                             Therapeutic Exercise and NMR EXR  [x] (96910) Provided verbal/tactile cueing for activities related to strengthening, flexibility, endurance, ROM for improvements in LE, proximal hip, and core control with self care, mobility, lifting, ambulation. [x] (65658) Provided verbal/tactile cueing for activities related to improving balance, coordination, kinesthetic sense, posture, motor skill, proprioception to assist with LE, proximal hip, and core control in self-care, mobility, lifting, ambulation and eccentric single leg control.      NMR and Therapeutic Activities:    [x] (44810 or 91119) Provided verbal/tactile cueing for activities related to improving balance, coordination, kinesthetic sense, posture, motor skill, proprioception and motor activation to allow for proper function of core, proximal hip and LE with self-care and ADLs and functional mobility.   [] (92733) Gait Re-education- Provided training and instruction to the patient for proper LE, core and proximal hip recruitment and positioning and eccentric body weight control with ambulation re-education including up and down stairs     Home Exercise Program:    [x] (21297) Reviewed/Progressed HEP activities related to strengthening, flexibility, endurance, ROM of core, proximal hip and LE for functional self-care, mobility, lifting and ambulation/stair navigation   [] (19619) Reviewed/Progressed HEP activities related to improving balance, coordination, kinesthetic sense, posture, motor skill, proprioception of core, proximal hip and LE for self-care, mobility, lifting, and ambulation/stair navigation      Manual Treatments:  PROM / STM / Oscillations-Mobs:  G-I, II, III, IV (PA's, Inf., Post.)  [] (72370) Provided manual therapy to mobilize LE, proximal hip and/or LS spine soft tissue/joints for the purpose of modulating pain, promoting relaxation, increasing ROM, reducing/eliminating soft tissue swelling/inflammation/restriction, improving soft tissue extensibility and allowing for proper ROM for normal function with self-care, mobility, lifting and ambulation. Modalities:  + CP 15'   [] GAME READY (VASO)- for significant edema, swelling, pain control. Charges:  Timed Code Treatment Minutes: 40'    Total Treatment Minutes: 36'       [] EVAL (LOW) 455 1011 (typically 20 minutes face-to-face)  [] EVAL (MOD) 45943 (typically 30 minutes face-to-face)  [] EVAL (HIGH) 43281 (typically 45 minutes face-to-face)  [] RE-EVAL     [x] XO(51610) x 2    [] IONTO  [x] NMR (55005) x 1     [] VASO  [] Manual (63937) x     [] Other:  [] TA x      [] Mech Traction (33313)  [] ES(attended) (59075)      [] ES (un) (72362):         ASSESSMENT:  Poor eccentric control with LSD, unable to complete step up/over with proper form. GOALS:    Patient stated goal:    [] Progressing: [] Met: [] Not Met: [] Adjusted    Therapist goals for Patient:   Short Term Goals: To be achieved in: 2 weeks  1. Independent in HEP and progression per patient tolerance, in order to prevent re-injury. [x] Progressing: [] Met: [] Not Met: [] Adjusted   2. Patient will have a decrease in pain to facilitate improvement in movement, function, and ADLs as indicated by Functional Deficits. [x] Progressing: [] Met: [] Not Met: [] Adjusted    Long Term Goals:  To be achieved in:   12 weeks  - The patient is expected to demonstrate less than 14% impairment, limitation or restriction in:  - walking and moving around (mobility)  - Patient will demonstrate increased passive and active ROM to full, symmetrical and painfree to allow for proper joint functioning as indicated by patients Functional Deficits.    [x] Progressing: [] Met: [] Not Met: [] Adjusted  - Patient will demonstrate an increase in Strength to full and painfree to resistance testing to allow for proper functional mobility as indicated by patients Functional Deficits.    [x] Progressing: [] Met: [] Not Met: [] Adjusted     - Patient will return to desired, higher level, functional activities without increased symptoms or restriction. [x] Progressing: [] Met: [] Not Met: [] Adjusted        Overall Progression Towards Functional goals/ Treatment Progress Update:  [] Patient is progressing as expected towards functional goals listed. [] Progression is slowed due to complexities/Impairments listed. [] Progression has been slowed due to co-morbidities. [x] Plan just implemented, too soon to assess goals progression <30days   [] Goals require adjustment due to lack of progress  [] Patient is not progressing as expected and requires additional follow up with physician  [] Other    Prognosis for POC: [x] Good [] Fair  [] Poor      Patient requires continued skilled intervention: [x] Yes  [] No    Treatment/Activity Tolerance:  [x] Patient able to complete treatment  [] Patient limited by fatigue  [] Patient limited by pain    [] Patient limited by other medical complications  [] Other:         Return to Play: (if applicable)   []  Stage 1: Intro to Strength   []  Stage 2: Return to Run and Strength   []  Stage 3: Return to Jump and Strength   []  Stage 4: Dynamic Strength and Agility   []  Stage 5: Sport Specific Training     []  Ready to Return to Play, Meets All Above Stages   []  Not Ready for Return to Sports   Comments:                               PLAN: Tolerated. .. [x] Continue per plan of care [] Alter current plan (see comments above)  [] Plan of care initiated [] Hold pending MD visit [] Discharge      Electronically signed by:  Sami Bejarano PT, OMT-C    Note: If patient does not return for scheduled/ recommended follow up visits, this note will serve as a discharge from care along with most recent update on progress.

## 2021-09-03 ENCOUNTER — HOSPITAL ENCOUNTER (OUTPATIENT)
Dept: PHYSICAL THERAPY | Age: 56
Setting detail: THERAPIES SERIES
Discharge: HOME | End: 2021-09-03
Payer: COMMERCIAL

## 2021-09-03 PROCEDURE — 97112 NEUROMUSCULAR REEDUCATION: CPT | Performed by: SPECIALIST/TECHNOLOGIST

## 2021-09-03 PROCEDURE — 97110 THERAPEUTIC EXERCISES: CPT | Performed by: SPECIALIST/TECHNOLOGIST

## 2021-09-03 NOTE — FLOWSHEET NOTE
The [facility] [employer] Orthopaedics and Sports Rehabilitation; [employer]         Physical Therapy Treatment Note/ Progress Report:           Date:  9/3/2021  Patient Name:  Sunni Naranjo    :  1965  MRN: [id number]  Restrictions/Precautions:    Medical/Treatment Diagnosis Information:  ·    M25.561 left knee pain, S/P left knee scope     Insurance/Certification information:     Physician Information:    Dr. Brielle Christian  Has the plan of care been signed (Y/N):        []  Yes  [x]  No     Date of Patient follow up with Physician:       Is this a Progress Report:     []  Yes  [x]  No        If Yes:  Date Range for reporting period:  Beginning 21  Ending     Progress report will be due (10 Rx or 30 days whichever is less):        Recertification will be due (POC Duration  / 90 days whichever is less):  21        Visit # Insurance Allowable Auth Required   9   Auth PIETRO -  visits    - 10/19 8 visits []  Yes []  No        Functional Scale:    Date assessed:       Latex Allergy:  [x]NO      []YES  Preferred Language for Healthcare:   [x]English       []other:      Pain level:  4/10     SUBJECTIVE:  States the knee is doing well today. Pt. C/o difficulties with stairs.       OBJECTIVE: -1 to 120   Observation:    Test measurements:      RESTRICTIONS/PRECAUTIONS:    Exercises/Interventions:       Therapeutic Ex (90299) Sets/sec Reps Notes/CUES   slant  stretch   3 x 30\"     Quad sets       slr's (flex, abd)   3x 10 ea      Heel slides + SB      Saq/laq   1# 30x /      Ext prop with ice   10'     bridge  X 20    clamshells  Green loop 20x    Bike   5'           Leg press  80# 2 x 15     FSU  6' x 20 TC   Step up and over   held   [facility] TKE  abd  60# 30 x 3\"  45# x 30 B     SLB   3 x 20\" HHA   LSD  2'' 10x NV          Manual Intervention (65831)      Prom/stm   ' Therapeutic Exercise and NMR EXR  [x] (57666) Provided verbal/tactile cueing for activities related to strengthening, flexibility, endurance, ROM for improvements in LE, proximal hip, and core control with self care, mobility, lifting, ambulation. [x] (38653) Provided verbal/tactile cueing for activities related to improving balance, coordination, kinesthetic sense, posture, motor skill, proprioception to assist with LE, proximal hip, and core control in self-care, mobility, lifting, ambulation and eccentric single leg control.      NMR and Therapeutic Activities:    [x] (02372 or 19575) Provided verbal/tactile cueing for activities related to improving balance, coordination, kinesthetic sense, posture, motor skill, proprioception and motor activation to allow for proper function of core, proximal hip and LE with self-care and ADLs and functional mobility.   [] (09813) Gait Re-education- Provided training and instruction to the patient for proper LE, core and proximal hip recruitment and positioning and eccentric body weight control with ambulation re-education including up and down stairs     Home Exercise Program:    [x] (23294) Reviewed/Progressed HEP activities related to strengthening, flexibility, endurance, ROM of core, proximal hip and LE for functional self-care, mobility, lifting and ambulation/stair navigation   [] (39995) Reviewed/Progressed HEP activities related to improving balance, coordination, kinesthetic sense, posture, motor skill, proprioception of core, proximal hip and LE for self-care, mobility, lifting, and ambulation/stair navigation      Manual Treatments:  PROM / STM / Oscillations-Mobs:  G-I, II, III, IV (PA's, Inf., Post.)  [] (13759) Provided manual therapy to mobilize LE, proximal hip and/or LS spine soft tissue/joints for the purpose of modulating pain, promoting relaxation, increasing ROM, reducing/eliminating soft tissue swelling/inflammation/restriction, improving soft tissue extensibility and allowing for proper ROM for normal function with self-care, mobility, lifting and ambulation. Modalities:  + CP 15'   [] GAME READY (VASO)- for significant edema, swelling, pain control. Charges:  Timed Code Treatment Minutes: 40'    Total Treatment Minutes: 36'       [] EVAL (LOW) [id number] (typically 20 minutes face-to-face)  [] EVAL (MOD) 44397 (typically 30 minutes face-to-face)  [] EVAL (HIGH) 79685 (typically 45 minutes face-to-face)  [] RE-EVAL     [x] EB(87088) x 2    [] IONTO  [x] NMR (64994) x 1     [] VASO  [] Manual (67215) x     [] Other:  [] TA x      [] Mech Traction (71025)  [] ES(attended) (39218)      [] ES (un) (34000):         ASSESSMENT:  Poor eccentric control with LSD, unable to complete step up/over with proper form. GOALS:    Patient stated goal:    [] Progressing: [] Met: [] Not Met: [] Adjusted    Therapist goals for Patient:   Short Term Goals: To be achieved in: 2 weeks  1. Independent in HEP and progression per patient tolerance, in order to prevent re-injury. [x] Progressing: [] Met: [] Not Met: [] Adjusted   2. Patient will have a decrease in pain to facilitate improvement in movement, function, and ADLs as indicated by Functional Deficits. [x] Progressing: [] Met: [] Not Met: [] Adjusted    Long Term Goals:  To be achieved in:   12 weeks  - The patient is expected to demonstrate less than 14% impairment, limitation or restriction in:  - walking and moving around (mobility)  - Patient will demonstrate increased passive and active ROM to full, symmetrical and painfree to allow for proper joint functioning as indicated by patients Functional Deficits.    [x] Progressing: [] Met: [] Not Met: [] Adjusted  - Patient will demonstrate an increase in Strength to full and painfree to resistance testing to allow for proper functional mobility as indicated by patients Functional Deficits.    [x] Progressing: [] Met: [] Not Met: [] Adjusted     - Patient will return to desired, higher level,  functional activities without increased symptoms or restriction. [x] Progressing: [] Met: [] Not Met: [] Adjusted        Overall Progression Towards Functional goals/ Treatment Progress Update:  [] Patient is progressing as expected towards functional goals listed. [] Progression is slowed due to complexities/Impairments listed. [] Progression has been slowed due to co-morbidities. [x] Plan just implemented, too soon to assess goals progression <30days   [] Goals require adjustment due to lack of progress  [] Patient is not progressing as expected and requires additional follow up with physician  [] Other    Prognosis for POC: [x] Good [] Fair  [] Poor      Patient requires continued skilled intervention: [x] Yes  [] No    Treatment/Activity Tolerance:  [x] Patient able to complete treatment  [] Patient limited by fatigue  [] Patient limited by pain    [] Patient limited by other medical complications  [] Other:         Return to Play: (if applicable)   []  Stage 1: Intro to Strength   []  Stage 2: Return to Run and Strength   []  Stage 3: Return to Jump and Strength   []  Stage 4: Dynamic Strength and Agility   []  Stage 5: Sport Specific Training     []  Ready to Return to Play, Meets All Above Stages   []  Not Ready for Return to Sports   Comments:                               PLAN: Tolerated. .. [x] Continue per plan of care [] Alter current plan (see comments above)  [] Plan of care initiated [] Hold pending MD visit [] Discharge      Electronically signed by: Ant Ohara, PTA  [id number],NATHANIEL Gauthier PT, MPT      Note: If patient does not return for scheduled/ recommended follow up visits, this note will serve as a discharge from care along with most recent update on progress.

## 2021-09-09 ENCOUNTER — HOSPITAL ENCOUNTER (OUTPATIENT)
Dept: PHYSICAL THERAPY | Age: 56
Setting detail: THERAPIES SERIES
Discharge: HOME | End: 2021-09-09
Payer: COMMERCIAL

## 2021-09-09 PROCEDURE — 97112 NEUROMUSCULAR REEDUCATION: CPT | Performed by: SPECIALIST/TECHNOLOGIST

## 2021-09-09 PROCEDURE — 97110 THERAPEUTIC EXERCISES: CPT | Performed by: SPECIALIST/TECHNOLOGIST

## 2021-09-09 NOTE — FLOWSHEET NOTE
The [facility] [employer] [employer] Orthopaedics and Sports Rehabilitation; [facility]         Physical Therapy Treatment Note/ Progress Report:           Date:  2021  Patient Name:  Kavitha Emerson    :  1965  MRN: [id number]  Restrictions/Precautions:    Medical/Treatment Diagnosis Information:  ·    M25.561 left knee pain, S/P left knee scope     Insurance/Certification information:     Physician Information:    Dr. Moo Zimmerman  Has the plan of care been signed (Y/N):        [x]  Yes  [x]  No     Date of Patient follow up with Physician:       Is this a Progress Report:     []  Yes  [x]  No        If Yes:  Date Range for reporting period:  Beginning 21  Ending     Progress report will be due (10 Rx or 30 days whichever is less):  14      Recertification will be due (POC Duration  / 90 days whichever is less):  21        Visit # Insurance Allowable Auth Required   10   Auth PIETRO -  visits    - 10/19 8 visits []  Yes []  No        Functional Scale:  LEFS  Date assessed:  21     Latex Allergy:  [x]NO      []YES  Preferred Language for Healthcare:   [x]English       []other:      Pain level:  0/10     SUBJECTIVE:  States the knee is doing well today. Pt. Reports she is having less pain with stairs.     OBJECTIVE: Taken on 21 -1 to 120   Observation:    Test measurements:      RESTRICTIONS/PRECAUTIONS:    Exercises/Interventions:       Therapeutic Ex (04183) Sets/sec Reps Notes/CUES   slant  stretch   3 x 30\"     Quad sets       slr's (flex, abd)   3x 10 ea      Heel slides + SB      Saq/laq   1# 30x /      Ext prop with ice   10'     bridge  X 20    clamshells  Green loop 20x    Bike   5'           Leg press  80# 2 x 15     FSU  6' x 20 TC   Step up and over   held   [facility] [place] TKE  abd  60# 30 x 3\"  45# x 30 B     SLB   3 x 20\" HHA   LSD  2'' 10x NV          Manual Intervention (98824)      Prom/stm   ' Therapeutic Exercise and NMR EXR  [x] (41930) Provided verbal/tactile cueing for activities related to strengthening, flexibility, endurance, ROM for improvements in LE, proximal hip, and core control with self care, mobility, lifting, ambulation. [x] (17038) Provided verbal/tactile cueing for activities related to improving balance, coordination, kinesthetic sense, posture, motor skill, proprioception to assist with LE, proximal hip, and core control in self-care, mobility, lifting, ambulation and eccentric single leg control.      NMR and Therapeutic Activities:    [x] (40971 or 84992) Provided verbal/tactile cueing for activities related to improving balance, coordination, kinesthetic sense, posture, motor skill, proprioception and motor activation to allow for proper function of core, proximal hip and LE with self-care and ADLs and functional mobility.   [] (31821) Gait Re-education- Provided training and instruction to the patient for proper LE, core and proximal hip recruitment and positioning and eccentric body weight control with ambulation re-education including up and down stairs     Home Exercise Program:    [x] (92532) Reviewed/Progressed HEP activities related to strengthening, flexibility, endurance, ROM of core, proximal hip and LE for functional self-care, mobility, lifting and ambulation/stair navigation   [] (14082) Reviewed/Progressed HEP activities related to improving balance, coordination, kinesthetic sense, posture, motor skill, proprioception of core, proximal hip and LE for self-care, mobility, lifting, and ambulation/stair navigation      Manual Treatments:  PROM / STM / Oscillations-Mobs:  G-I, II, III, IV (PA's, Inf., Post.)  [] (95121) Provided manual therapy to mobilize LE, proximal hip and/or LS spine soft tissue/joints for the purpose of modulating pain, promoting relaxation, increasing ROM, reducing/eliminating soft tissue swelling/inflammation/restriction, improving soft tissue extensibility and allowing for proper ROM for normal function with self-care, mobility, lifting and ambulation. Modalities:  + CP 15'   [] GAME READY (VASO)- for significant edema, swelling, pain control. Charges:  Timed Code Treatment Minutes: 40'    Total Treatment Minutes: 36'       [] EVAL (LOW) 455 1011 (typically 20 minutes face-to-face)  [] EVAL (MOD) 32421 (typically 30 minutes face-to-face)  [] EVAL (HIGH) 90143 (typically 45 minutes face-to-face)  [] RE-EVAL     [x] FT(31412) x 2    [] IONTO  [x] NMR (78408) x 1     [] VASO  [] Manual (36491) x     [] Other:  [] TA x      [] Mech Traction (95537)  [] ES(attended) (44355)      [] ES (un) (02147):         ASSESSMENT:  Poor eccentric control with LSD, unable to complete step up/over with proper form. GOALS:    Patient stated goal:    [] Progressing: [] Met: [] Not Met: [] Adjusted    Therapist goals for Patient:   Short Term Goals: To be achieved in: 2 weeks  1. Independent in HEP and progression per patient tolerance, in order to prevent re-injury. [x] Progressing: [] Met: [] Not Met: [] Adjusted   2. Patient will have a decrease in pain to facilitate improvement in movement, function, and ADLs as indicated by Functional Deficits. [x] Progressing: [] Met: [] Not Met: [] Adjusted    Long Term Goals:  To be achieved in:   12 weeks  - The patient is expected to demonstrate less than 14% impairment, limitation or restriction in:  - walking and moving around (mobility)  - Patient will demonstrate increased passive and active ROM to full, symmetrical and painfree to allow for proper joint functioning as indicated by patients Functional Deficits.    [x] Progressing: [] Met: [] Not Met: [] Adjusted  - Patient will demonstrate an increase in Strength to full and painfree to resistance testing to allow for proper functional mobility as indicated by patients Functional Deficits.    [x] Progressing: [] Met: [] Not Met: [] Adjusted     - Patient will return to desired, higher level,  functional activities without increased symptoms or restriction. [x] Progressing: [] Met: [] Not Met: [] Adjusted        Overall Progression Towards Functional goals/ Treatment Progress Update:  [] Patient is progressing as expected towards functional goals listed. [] Progression is slowed due to complexities/Impairments listed. [] Progression has been slowed due to co-morbidities. [x] Plan just implemented, too soon to assess goals progression <30days   [] Goals require adjustment due to lack of progress  [] Patient is not progressing as expected and requires additional follow up with physician  [] Other    Prognosis for POC: [x] Good [] Fair  [] Poor      Patient requires continued skilled intervention: [x] Yes  [] No    Treatment/Activity Tolerance:  [x] Patient able to complete treatment  [] Patient limited by fatigue  [] Patient limited by pain    [] Patient limited by other medical complications  [] Other:         Return to Play: (if applicable)   []  Stage 1: Intro to Strength   []  Stage 2: Return to Run and Strength   []  Stage 3: Return to Jump and Strength   []  Stage 4: Dynamic Strength and Agility   []  Stage 5: Sport Specific Training     []  Ready to Return to Play, Meets All Above Stages   []  Not Ready for Return to Sports   Comments:                               PLAN:   [x] Continue per plan of care [] Alter current plan (see comments above)  [] Plan of care initiated [] Hold pending MD visit [] Discharge      Electronically signed by: Jeremy Macdonald, PTA  [id number],TIFFANY Gauthier, PT, MPT      Note: If patient does not return for scheduled/ recommended follow up visits, this note will serve as a discharge from care along with most recent update on progress.

## 2021-09-16 ENCOUNTER — HOSPITAL ENCOUNTER (OUTPATIENT)
Dept: PHYSICAL THERAPY | Age: 56
Setting detail: THERAPIES SERIES
Discharge: HOME | End: 2021-09-16
Payer: COMMERCIAL

## 2021-09-16 PROCEDURE — 97112 NEUROMUSCULAR REEDUCATION: CPT | Performed by: SPECIALIST/TECHNOLOGIST

## 2021-09-16 PROCEDURE — 97110 THERAPEUTIC EXERCISES: CPT | Performed by: SPECIALIST/TECHNOLOGIST

## 2021-09-16 NOTE — DISCHARGE SUMMARY
The [facility] and [facility]       Physical [facility] Discharge  Date: 2021        Patient Name:  Kofi Montenegro    :  1965  MRN: [id number]  Referring Physician:Dr. Dodie Lucas  Diagnosis:S/P left knee scope                        ICD Code:M25.561  [x] Surgical [] Conservative  Therapy Diagnosis/Practice Pattern:left knee pain and weakness      Total number of visits: 11   Reporting Period:   Beginning Date:21   End Date:21    OBJECTIVE  Test used Initial score Discharge Score   Pain Summary  7/10 0/10   Functional questionnaire LEFS 65% 40%   Functional Testing            ROM Knee flex 77° 126°    ext -4° 0°   Strength Knee flex N/T 5/5    ext 4-/5 5/5        Co-morbidities/Complexities (which will affect course of rehabilitation):   []None        Arthritic conditions   []Rheumatoid arthritis (M05.9)  [x]Osteoarthritis (M19.91) Cardiovascular conditions   []Hypertension (I10)  []Hyperlipidemia (E78.5)  []Angina pectoris (I20)  []Atherosclerosis (I70) Musculoskeletal conditions   []Disc pathology   []Congenital spine pathologies   []Prior surgical intervention  []Osteoporosis (M81.8)  []Osteopenia (M85.8)   Endocrine conditions   []Hypothyroid (E03.9)  []Hyperthyroid Gastrointestinal conditions   []Constipation (R96.10) Metabolic conditions   []Morbid obesity (E66.01)  []Diabetes type 1(E10.65) or 2 (E11.65)   []Neuropathy (G60.9)   Pulmonary conditions   []Asthma (J45)  []Coughing   []COPD (J44.9) Psychological Disorders  []Anxiety (F41.9)  []Depression (F32.9)   []Other: []Other:           Treatment to date:  [x] Therapeutic Exercise    [] Modalities:  [] Therapeutic Activity             []Ultrasound            [x]Electrical Stimulation  [] Gait Training     []Cervical Traction    [] Lumbar Traction  [x] Neuromuscular Re-education [x] Cold/hotpack         []Iontophoresis  [x] Instruction in HEP      Other:  [x] Manual Therapy                   [] ?           []   Assessment:  [] All Goals were achieved. [] The following goals were achieved (#'s):  [x] The following goals were not achieved for the following reasons: score on LEFS  Functional/assessment of improvement as it relates to each goal: ROM, strength, and pain levels with ADL's have improved. Plan of Care:  [] Discharge from Therapy Services due to:    Reason for Discharge:   [] Most goals achieved    [] Patient having surgery  [] Physician discontinued therapy  [] Insurance/Financial Limitations [] Patient did not return for follow up visits [] Home program/1 visit only   [] No subjective or objective improvement [] Plateaued   [] Patient was unable to adhere to the plan of care established at evaluation. [] Referred back to physician for re-evaluation and did not return.      [] Other:?       Electronically signed by:

## 2021-09-16 NOTE — FLOWSHEET NOTE
[facility] Orthopaedics and Sports Rehabilitation; [facility]         Physical Therapy Treatment Note/ Progress Report:           Date:  2021  Patient Name:  Miryam East    :  1965  MRN: [id number]  Restrictions/Precautions:    Medical/Treatment Diagnosis Information:  ·    M25.561 left knee pain, S/P left knee scope     Insurance/Certification information:     Physician Information:    Dr. Janeen Moreno  Has the plan of care been signed (Y/N):        [x]  Yes  [x]  No     Date of Patient follow up with Physician:       Is this a Progress Report:     []  Yes  [x]  No        If Yes:  Date Range for reporting period:  Beginning 21  Ending     Progress report will be due (10 Rx or 30 days whichever is less):        Recertification will be due (POC Duration  / 90 days whichever is less):  21        Visit # Insurance Allowable Auth Required   11   Auth PIETRO -  visits    - 10/19 8 visits []  Yes []  No        Functional Scale:  LEFS  Date assessed:  21     Latex Allergy:  [x]NO      []YES  Preferred Language for Healthcare:   [x]English       []other:      Pain level:  0/10     SUBJECTIVE:  See D/C note   OBJECTIVE: Taken on 21 -1 to 120   Observation:    Test measurements:      RESTRICTIONS/PRECAUTIONS:    Exercises/Interventions:       Therapeutic Ex (22441) Sets/sec Reps Notes/CUES   slant  stretch   3 x 30\"     Quad sets       slr's (flex, abd)   3x 10 ea      Heel slides + SB      Saq/laq   1# 30x /      Ext prop with ice   10'     bridge  X 20    clamshells  Green loop 30x    Bike   5'           Leg press  80# 2 x 15     FSU  6' x 20 TC   Step up and over   held   [facility] TKE  abd  60# 30 x 3\"  45# x 30 B     SLB   3 x 20\" HHA   LSD  2'' 10x NV          Manual Intervention (05545)      Prom/stm   ' Therapeutic Exercise and NMR EXR  [x] (36144) Provided verbal/tactile cueing for activities related to strengthening, flexibility, endurance, ROM for improvements in LE, proximal hip, and core control with self care, mobility, lifting, ambulation. [x] (70690) Provided verbal/tactile cueing for activities related to improving balance, coordination, kinesthetic sense, posture, motor skill, proprioception to assist with LE, proximal hip, and core control in self-care, mobility, lifting, ambulation and eccentric single leg control.      NMR and Therapeutic Activities:    [x] (78512 or 67450) Provided verbal/tactile cueing for activities related to improving balance, coordination, kinesthetic sense, posture, motor skill, proprioception and motor activation to allow for proper function of core, proximal hip and LE with self-care and ADLs and functional mobility.   [] (73731) Gait Re-education- Provided training and instruction to the patient for proper LE, core and proximal hip recruitment and positioning and eccentric body weight control with ambulation re-education including up and down stairs     Home Exercise Program:    [x] (62418) Reviewed/Progressed HEP activities related to strengthening, flexibility, endurance, ROM of core, proximal hip and LE for functional self-care, mobility, lifting and ambulation/stair navigation   [] (98048) Reviewed/Progressed HEP activities related to improving balance, coordination, kinesthetic sense, posture, motor skill, proprioception of core, proximal hip and LE for self-care, mobility, lifting, and ambulation/stair navigation      Manual Treatments:  PROM / STM / Oscillations-Mobs:  G-I, II, III, IV (PA's, Inf., Post.)  [] (78687) Provided manual therapy to mobilize LE, proximal hip and/or LS spine soft tissue/joints for the purpose of modulating pain, promoting relaxation, increasing ROM, reducing/eliminating soft tissue swelling/inflammation/restriction, improving soft tissue extensibility and allowing for proper ROM for normal function with self-care, mobility, lifting and ambulation. Modalities:  + CP 15'   [] GAME READY (VASO)- for significant edema, swelling, pain control. Charges:  Timed Code Treatment Minutes: 40'    Total Treatment Minutes: 36'       [] EVAL (LOW) 455 1011 (typically 20 minutes face-to-face)  [] EVAL (MOD) 19332 (typically 30 minutes face-to-face)  [] EVAL (HIGH) 15574 (typically 45 minutes face-to-face)  [] RE-EVAL     [x] ES(00768) x 2    [] IONTO  [x] NMR (03861) x 1     [] VASO  [] Manual (53939) x     [] Other:  [] TA x      [] Mech Traction (66271)  [] ES(attended) (14455)      [] ES (un) (60766):         ASSESSMENT:  Poor eccentric control with LSD, unable to complete step up/over with proper form. GOALS:    Patient stated goal:    [] Progressing: [] Met: [] Not Met: [] Adjusted    Therapist goals for Patient:   Short Term Goals: To be achieved in: 2 weeks  1. Independent in HEP and progression per patient tolerance, in order to prevent re-injury. [x] Progressing: [] Met: [] Not Met: [] Adjusted   2. Patient will have a decrease in pain to facilitate improvement in movement, function, and ADLs as indicated by Functional Deficits. [x] Progressing: [] Met: [] Not Met: [] Adjusted    Long Term Goals:  To be achieved in:   12 weeks  - The patient is expected to demonstrate less than 14% impairment, limitation or restriction in:  - walking and moving around (mobility)  - Patient will demonstrate increased passive and active ROM to full, symmetrical and painfree to allow for proper joint functioning as indicated by patients Functional Deficits.    [x] Progressing: [] Met: [] Not Met: [] Adjusted  - Patient will demonstrate an increase in Strength to full and painfree to resistance testing to allow for proper functional mobility as indicated by patients Functional Deficits.    [x] Progressing: [] Met: [] Not Met: [] Adjusted     - Patient will return to desired, higher level,  functional activities without increased symptoms or restriction. [x] Progressing: [] Met: [] Not Met: [] Adjusted        Overall Progression Towards Functional goals/ Treatment Progress Update:  [] Patient is progressing as expected towards functional goals listed. [] Progression is slowed due to complexities/Impairments listed. [] Progression has been slowed due to co-morbidities. [x] Plan just implemented, too soon to assess goals progression <30days   [] Goals require adjustment due to lack of progress  [] Patient is not progressing as expected and requires additional follow up with physician  [] Other    Prognosis for POC: [x] Good [] Fair  [] Poor      Patient requires continued skilled intervention: [x] Yes  [] No    Treatment/Activity Tolerance:  [x] Patient able to complete treatment  [] Patient limited by fatigue  [] Patient limited by pain    [] Patient limited by other medical complications  [] Other:         Return to Play: (if applicable)   []  Stage 1: Intro to Strength   []  Stage 2: Return to Run and Strength   []  Stage 3: Return to Jump and Strength   []  Stage 4: Dynamic Strength and Agility   []  Stage 5: Sport Specific Training     []  Ready to Return to Play, Meets All Above Stages   []  Not Ready for Return to Sports   Comments:                               PLAN:   [] Continue per plan of care [] Alter current plan (see comments above)  [] Plan of care initiated [] Hold pending MD visit [x] Discharge      Electronically signed by: Bigg Gunter, PTA  [id number],LUCIA Gauthier, PT, MPT      Note: If patient does not return for scheduled/ recommended follow up visits, this note will serve as a discharge from care along with most recent update on progress.

## 2021-09-21 ENCOUNTER — OFFICE VISIT (OUTPATIENT)
Dept: ORTHOPEDIC SURGERY | Age: 56
End: 2021-09-21
Payer: COMMERCIAL

## 2021-09-21 VITALS
BODY MASS INDEX: 43.34 KG/M2 | HEART RATE: 81 BPM | HEIGHT: 59 IN | WEIGHT: 215 LBS | DIASTOLIC BLOOD PRESSURE: 79 MMHG | SYSTOLIC BLOOD PRESSURE: 128 MMHG

## 2021-09-21 DIAGNOSIS — M25.562 CHRONIC PAIN OF LEFT KNEE: Primary | ICD-10-CM

## 2021-09-21 DIAGNOSIS — S72.435G: ICD-10-CM

## 2021-09-21 DIAGNOSIS — G89.29 CHRONIC PAIN OF LEFT KNEE: Primary | ICD-10-CM

## 2021-09-21 PROCEDURE — 99214 OFFICE O/P EST MOD 30 MIN: CPT | Performed by: ORTHOPAEDIC SURGERY

## 2021-09-22 NOTE — PROGRESS NOTES
Patient Name: Carlene Baez MRN: [id number]   Age: 55 y.o. YOB: 1965   Sex: female         [address] Complaint   Patient presents with    Knee Pain     LEFT KNEE       HISTORY OF PRESENT ILLNESS   Patient returns for  postoperative visit status post arthroscopy of their knee. pain and quad weakness improved with phsyical therapy and last injection. Patient is still noting minor give way and weakness   Noting increasing buckling give way denies any fall trauma or injury denies numbness burning tingling radiating pains down the leg. She has been utilizing the Cho-Pat knee strap brace with limited improvement. There is mild swelling about the knee. They deny any numbness or tingling. They are ambulating with out the use of her crutches. Assessment   PHYSICAL EXAM   Vital Signs: /79   Pulse 81   Ht 4' 11\" (1.499 m)   Wt 215 lb (97.5 kg)   BMI 43.42 kg/m²   Examination of the knee shows a well-healed incisions      There is mild swelling. There is no drainage. Range of motion is 0-110°. There is no calf tenderness. The patient is neurovascularly intact. NEUROLOGICALLY: There is no evidence for sensory or motor deficits in the extremity. Coordination appears full with no spacticity or rigidity. Reflexes appear to be symmetric. Skin shows no rashes/ecchymosis to the affected area, no hyperesthesias, no discoloration, no temperature or color discrepancies. Distal circulation intact. No signs of RSD. IMPRESSION     post  left knee arthroscopy with quad weakness. Ipmroved. PLAN   The patient is doing well   status post leftknee arthroscopy. The patient will slowly resume normal activities.           Plan for continued work on infrapatellar tendinitis with [facility] stra, local care and start of physical therapy  We have cautioned that she should be very mild and progressive with her exercise Continue use of of neuromuscular stimulator unit for muscle weakness extensor lag and quadriceps atrophy.

## 2021-09-28 ENCOUNTER — TELEPHONE (OUTPATIENT)
Dept: ORTHOPEDIC SURGERY | Age: 56
End: 2021-09-28

## 2021-09-28 NOTE — TELEPHONE ENCOUNTER
General Question     Subject: PATIENT IS CALLING STATING SHE NEEDS DR. Popeye Barbosa TO WRITE A LETTER STATING WHAT AGE GROUP WILL PATIENT BE PERMITTED TO WORK. 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   Patient:  Alfred Cabrera  Contact Number: [phone]

## 2021-10-13 ENCOUNTER — TELEPHONE (OUTPATIENT)
Dept: ORTHOPEDIC SURGERY | Age: 56
End: 2021-10-13

## 2021-10-13 NOTE — TELEPHONE ENCOUNTER
Appointment Request     Patient requesting earlier appointment: Yes  Appointment offered to patient: November  PT WOULD LIKE TO GET IN AT [facility] BEFORE November.   Patient Contact Number: [phone]

## 2021-10-26 ENCOUNTER — OFFICE VISIT (OUTPATIENT)
Dept: ORTHOPEDIC SURGERY | Age: 56
End: 2021-10-26
Payer: COMMERCIAL

## 2021-10-26 VITALS
HEART RATE: 90 BPM | DIASTOLIC BLOOD PRESSURE: 77 MMHG | HEIGHT: 59 IN | WEIGHT: 215 LBS | SYSTOLIC BLOOD PRESSURE: 122 MMHG | BODY MASS INDEX: 43.34 KG/M2

## 2021-10-26 DIAGNOSIS — M25.562 CHRONIC PAIN OF LEFT KNEE: Primary | ICD-10-CM

## 2021-10-26 DIAGNOSIS — M17.12 PRIMARY OSTEOARTHRITIS OF LEFT KNEE: ICD-10-CM

## 2021-10-26 DIAGNOSIS — G89.29 CHRONIC PAIN OF LEFT KNEE: Primary | ICD-10-CM

## 2021-10-26 PROCEDURE — 99214 OFFICE O/P EST MOD 30 MIN: CPT | Performed by: PHYSICIAN ASSISTANT

## 2021-10-26 NOTE — PROGRESS NOTES
Patient Name: Aurora Kirk MRN: [id number]   Age: 55 y.o. YOB: 1965   Sex: female         [address] Complaint   Patient presents with    Knee Pain     LEFT KNEE-SCOPE 5/23/21 INJECTION 8/3/21-Still has some pain which can get up to a level 9. HISTORY OF PRESENT ILLNESS   Patient returns for  postoperative visit status post arthroscopy of their knee. Patient has noted significant improvement in quadricep strength since previous visit and has noticed an moderate reduction in pain but still notes pain intermittently around the knees especially around weather changes and increased periods of activity. She does still note intermittent give way and buckling of the knee still utilizing the Cho-Pat knee strap brace and intermittently utilizing crutches or cane. She denies any falls although she has had close calls due to children pulling and tugging on crutches or cane. Noted mild swelling around the knee denies any numbness burning or tingling but does describe intermittent cold sensation around the knee states that comes and lasts for just a few minutes and then fades. Rates overall pain as a 4-5 out of 10. Says it can get up to a 9 with periods of activity. She notes improvement with utilization of ibuprofen and/or Tylenol as well as ice she is still utilizing the neuromuscular stimulator unit. And still doing exercises on a regular basis that she was instructed to do with physical therapy. .     Assessment   PHYSICAL EXAM   Vital Signs: /77   Pulse 90   Ht 4' 11\" (1.499 m)   Wt 215 lb (97.5 kg)   BMI 43.42 kg/m²   Examination of the knee shows a well-healed incisions      There is mild swelling. There is no drainage. Range of motion is 0-120°. There is no calf tenderness. The patient is neurovascularly intact. Tenderness noted on the medial and lateral joint lines and along MCL and LCL.   Mild bony tibial pain and femoral condyle pain both medially and laterally. Significant improvement in strength of the quadricep since previous visit. NEUROLOGICALLY: There is no evidence for sensory or motor deficits in the extremity. Coordination appears full with no spacticity or rigidity. Reflexes appear to be symmetric. Skin shows no rashes/ecchymosis to the affected area, no hyperesthesias, no discoloration, no temperature or color discrepancies. Distal circulation intact. No signs of RSD. IMPRESSION     post  left knee arthroscopy with quad weakness. Ipmroved. PLAN   The patient is doing well   status post leftknee arthroscopy. The patient will slowly resume normal activities. Plan for continued work on infrapatellar tendinitis with chopat strap, local care and start of physical therapy  We have cautioned that she should be very mild and progressive with her exercise  We will plan for left knee genicular block with injection to the left knee under fluoroscopy due to continued pain and irritability across the knee to help with reduction in pain and overall improvement in function to allow for more structured exercises to improve strength and overall function. Discussed the procedure with the patient discussed all potential complications of procedure with the patient field and answered all questions regarding the procedure with the patient patient agreed and consented to move forward with left knee genicular block and injection under fluoroscopy. Continue use of of neuromuscular stimulator unit for muscle weakness extensor lag and quadriceps atrophy. Plan for follow-up after 7 to 10 days after injection.

## 2021-11-17 ENCOUNTER — TELEPHONE (OUTPATIENT)
Dept: ORTHOPEDIC SURGERY | Age: 56
End: 2021-11-17

## 2021-11-17 NOTE — TELEPHONE ENCOUNTER
CPT: 25541, 0232T  BODY PART: left knee  STATUS: outpatient  AUTHORIZATION: NPR    Per Avinash Martini website, [facility]

## 2021-11-17 NOTE — TELEPHONE ENCOUNTER
General Question     Subject: PATIENT HAS SURGERY SCHEDULED FOR Monday, November 22. SHE WOULD LIKE TO KNOW IF SHE NEEDS TO DO A PRE-OP PHYSICAL OR ANYTHING PRIOR TO SURGERY.     Patient:  Oksana Barr  Contact Number: [phone]

## 2021-11-22 ENCOUNTER — HOSPITAL ENCOUNTER (OUTPATIENT)
Age: 56
Setting detail: OUTPATIENT SURGERY
Discharge: HOME | End: 2021-11-22
Attending: ORTHOPAEDIC SURGERY | Admitting: ORTHOPAEDIC SURGERY
Payer: COMMERCIAL

## 2021-11-22 VITALS
HEART RATE: 80 BPM | HEIGHT: 59 IN | BODY MASS INDEX: 42.54 KG/M2 | SYSTOLIC BLOOD PRESSURE: 146 MMHG | WEIGHT: 211 LBS | RESPIRATION RATE: 18 BRPM | OXYGEN SATURATION: 95 % | TEMPERATURE: 97.5 F | DIASTOLIC BLOOD PRESSURE: 92 MMHG

## 2021-11-22 DIAGNOSIS — M17.0 PRIMARY OSTEOARTHRITIS OF BOTH KNEES: Primary | ICD-10-CM

## 2021-11-22 DIAGNOSIS — M17.11 PRIMARY OSTEOARTHRITIS OF RIGHT KNEE: Primary | ICD-10-CM

## 2021-11-22 DIAGNOSIS — M17.12 PRIMARY OSTEOARTHRITIS OF LEFT KNEE: ICD-10-CM

## 2021-11-22 LAB
GLUCOSE BLD-MCNC: 278 MG/DL (ref 70–99)
PERFORMED ON: ABNORMAL
PREGNANCY, URINE: NEGATIVE

## 2021-11-22 PROCEDURE — 2720000010 HC SURG SUPPLY STERILE: Performed by: ORTHOPAEDIC SURGERY

## 2021-11-22 PROCEDURE — 64454 NJX AA&/STRD GNCLR NRV BRNCH: CPT | Performed by: ORTHOPAEDIC SURGERY

## 2021-11-22 PROCEDURE — 84703 CHORIONIC GONADOTROPIN ASSAY: CPT

## 2021-11-22 PROCEDURE — 2709999900 HC NON-CHARGEABLE SUPPLY: Performed by: ORTHOPAEDIC SURGERY

## 2021-11-22 PROCEDURE — 2500000003 HC RX 250 WO HCPCS: Performed by: ORTHOPAEDIC SURGERY

## 2021-11-22 PROCEDURE — 7100000011 HC PHASE II RECOVERY - ADDTL 15 MIN: Performed by: ORTHOPAEDIC SURGERY

## 2021-11-22 PROCEDURE — 6370000000 HC RX 637 (ALT 250 FOR IP): Performed by: ORTHOPAEDIC SURGERY

## 2021-11-22 PROCEDURE — 7100000010 HC PHASE II RECOVERY - FIRST 15 MIN: Performed by: ORTHOPAEDIC SURGERY

## 2021-11-22 PROCEDURE — 3600000002 HC SURGERY LEVEL 2 BASE: Performed by: ORTHOPAEDIC SURGERY

## 2021-11-22 PROCEDURE — 3600000012 HC SURGERY LEVEL 2 ADDTL 15MIN: Performed by: ORTHOPAEDIC SURGERY

## 2021-11-22 PROCEDURE — C9290 INJ, BUPIVACAINE LIPOSOME: HCPCS | Performed by: ORTHOPAEDIC SURGERY

## 2021-11-22 PROCEDURE — 6360000002 HC RX W HCPCS: Performed by: ORTHOPAEDIC SURGERY

## 2021-11-22 RX ORDER — OXYCODONE HYDROCHLORIDE AND ACETAMINOPHEN 5; 325 MG/1; MG/1
1 TABLET ORAL ONCE
Status: COMPLETED | OUTPATIENT
Start: 2021-11-22 | End: 2021-11-22

## 2021-11-22 RX ORDER — LIDOCAINE HYDROCHLORIDE 10 MG/ML
INJECTION, SOLUTION EPIDURAL; INFILTRATION; INTRACAUDAL; PERINEURAL PRN
Status: DISCONTINUED | OUTPATIENT
Start: 2021-11-22 | End: 2021-11-22 | Stop reason: ALTCHOICE

## 2021-11-22 RX ORDER — LIDOCAINE HYDROCHLORIDE 10 MG/ML
INJECTION, SOLUTION EPIDURAL; INFILTRATION; INTRACAUDAL; PERINEURAL
Status: DISCONTINUED
Start: 2021-11-22 | End: 2021-11-22 | Stop reason: HOSPADM

## 2021-11-22 RX ORDER — OXYCODONE HYDROCHLORIDE AND ACETAMINOPHEN 5; 325 MG/1; MG/1
1 TABLET ORAL EVERY 6 HOURS PRN
Qty: 28 TABLET | Refills: 0 | Status: SHIPPED | OUTPATIENT
Start: 2021-11-22 | End: 2021-11-29

## 2021-11-22 RX ADMIN — OXYCODONE HYDROCHLORIDE AND ACETAMINOPHEN 1 TABLET: 5; 325 TABLET ORAL at 08:25

## 2021-11-22 ASSESSMENT — PAIN - FUNCTIONAL ASSESSMENT
PAIN_FUNCTIONAL_ASSESSMENT: 0-10
PAIN_FUNCTIONAL_ASSESSMENT: PREVENTS OR INTERFERES SOME ACTIVE ACTIVITIES AND ADLS

## 2021-11-22 ASSESSMENT — PAIN DESCRIPTION - DESCRIPTORS: DESCRIPTORS: DISCOMFORT

## 2021-11-22 NOTE — PROGRESS NOTES
Pt checked in for procedure. Consent signed with patient.  [facility] aware no new orders given. Patient is able to demonstrate the ability to move from a reclining position to an upright position within the recliner. Blood draw completed in pre-op for PRP injection. 60 mL collected in syringe (volume includes anticoagulant ACDA provided from kit. Collected from (site) Left AC by Luisa Maya RN. Verified patients name / birth date - pt label applied to syringe. Blood given to Kailee. Patient tolerated well.

## 2021-11-22 NOTE — PROGRESS NOTES
Pt given written and verbal discharge instructions. Pt verbalized understanding. Pt wheeled to family car via wheelchair per her request, pt ambulated to car last few steps with no difficulty.

## 2021-11-22 NOTE — H&P
Update History & Physical    The patient's History and Physical of June 24, 2021 was reviewed with the patient and I examined the patient. There was no change. The surgical site was confirmed by the patient and me. Plan: The risks, benefits, expected outcome, and alternative to the recommended procedure have been discussed with the patient. Patient understands and wants to proceed with the procedure.      Electronically signed by Janeth Ponce MD on 11/22/2021 at 9:17 AM

## 2021-11-23 ENCOUNTER — TELEPHONE (OUTPATIENT)
Dept: FAMILY MEDICINE CLINIC | Age: 56
End: 2021-11-23

## 2021-11-23 DIAGNOSIS — E11.9 TYPE 2 DIABETES MELLITUS WITHOUT COMPLICATION, WITH LONG-TERM CURRENT USE OF INSULIN (HCC): ICD-10-CM

## 2021-11-23 DIAGNOSIS — Z79.4 TYPE 2 DIABETES MELLITUS WITHOUT COMPLICATION, WITH LONG-TERM CURRENT USE OF INSULIN (HCC): ICD-10-CM

## 2021-11-23 RX ORDER — GLUCOSAMINE HCL/CHONDROITIN SU 500-400 MG
CAPSULE ORAL
Qty: 100 STRIP | Refills: 5 | Status: SHIPPED | OUTPATIENT
Start: 2021-11-23

## 2021-11-23 NOTE — TELEPHONE ENCOUNTER
----- Message from Michael Toro sent at 11/23/2021  1:03 PM EST -----  Subject: Appointment Request    Reason for Call: Urgent Cough Cold    QUESTIONS  Type of Appointment? Established Patient  Reason for appointment request? No appointments available during search  Additional Information for Provider? PATIENT NEEDING APPOINTMENT [facility]   MARKED URGENT THROUGH CALL FLOW, NO APPOINTMENTS WERE AVALIABLE, TRIED THE   PRACTICE 5 NO ANSWER, SENT PATIENT TO THE A/H LINE. THANK YOU   ---------------------------------------------------------------------------  --------------  CALL BACK INFO  What is the best way for the office to contact you? OK to leave message on   voicemail  Preferred Call Back Phone Number?  [phone]  ---------------------------------------------------------------------------  --------------  SCRIPT ANSWERS

## 2021-11-23 NOTE — TELEPHONE ENCOUNTER
----- Message from Marilia Gage sent at 11/23/2021  1:03 PM EST -----  Subject: Appointment Request    Reason for Call: Urgent Cough Cold    QUESTIONS  Type of Appointment? Established Patient  Reason for appointment request? No appointments available during search  Additional Information for Provider? PATIENT NEEDING APPOINTMENT WAAS   MARKED URGENT THROUGH CALL FLOW, NO APPOINTMENTS WERE AVALIABLE, TRIED THE   PRACTICE 5 NO ANSWER, SENT PATIENT TO THE A/H LINE. THANK YOU   ---------------------------------------------------------------------------  --------------  CALL BACK INFO  What is the best way for the office to contact you? OK to leave message on   voicemail  Preferred Call Back Phone Number?  [phone]  ---------------------------------------------------------------------------  --------------  SCRIPT ANSWERS

## 2021-11-23 NOTE — TELEPHONE ENCOUNTER
Pt states that she recently had surgery, yesterday. Pt has a cough, she states that the surgeon checked her lungs and they were clear. Pt's symptoms are sinus drainage, and cough. Pt reports that she cohen not have a fever and that she still has her sense of smell and taste. Pt has tried Robatussin and Sudifed.     Pt will try to do an e-visit

## 2021-11-23 NOTE — OP NOTE
Operative Note      Patient: Chu Barfield  YOB: 1965  MRN: [id number]    Date of Procedure: 2021    Pre-Op Diagnosis: LEFT KNEE PAIN AND OSTEOARTHRITIS    Post-Op Diagnosis: Same       Procedure(s):  LEFT KNEE GENICULAR BLOCK WITH PLATELET RICH PLASMA INJECTION SITE CONFIRMED BY FLUOROSCOPY    Surgeon(s):  Bessy Miranda MD    Assistant:   * No surgical staff found *    Anesthesia: Local    Estimated Blood Loss (mL): Minimal    Complications: None    Specimens:   * No specimens in log *    Implants:  * No implants in log *      Drains: * No LDAs found *    Findings:      Detailed Description of Procedure:     OPERATIVE REPORT      Patient Name:   Chu Barfield Surgeon:   Esha Escobar MD  :   1965 Dictated by:   Esha Escobar MD  MRN #:   [id number] PCP:  Prabha Timmons MD   Date of Surgery:  2021 Referring:   No ref. provider found  SURGEON:   ISABEL Carrillo Kendy: Marivel Ro, [address]  ANESTHESIA:  General.       PREOPERATIVE DIAGNOSES:      1. Left knee osteoarthritis. 2. Chronic Left knee pain        POSTOPERATIVE DIAGNOSES:      same          PROCEDURES PERFORMED:               1.Needle localization Left under flouro  2. Injection of large joint Left under flouro  3. Geniculate nerve block of superolateral  superomedial  Inferomedial geniculate nerve blockLeft         SURGEON:   ISABEL Carrillo Kendy: Marivel Ro, [address]  ANESTHESIA:  local      DETAILS OF PROCEDURE:        The patient was given preop medication and brought to the operating room where the surgery was again confirmed, as scheduled for the left knee. The knee was then prepped with  Chloroprep  A time-out confirmation was performed, according to the universal protocol. Blood was obtained and then centrifuged. This was fractionated into platelet rich/poor plasma and platelet poor plasma aggregate.      Using flouro 22 gauge needles were placed in the areas of the Left femur and tibia for superolateral, superomedial, inferomedial injection after local injection of 3cc into each site. 7cc of exparel and 6cc of ppp were injected into each portal of the geniculate needles. Flouro localization was done for Left after injection with local anaestheitic. 1% xylocaine into the injection site. Injection was done through needle localization with contrast and flouro of PRP, PPP was done into the joint through a lateral approach bilateral    Compression dressing and sterile gauze was applied. ____________________________________           Sarahi Diego MDtransferred to the stretcher, and brought to the recovery room in satisfactory condition.   Sponge and needle counts were correct.             ____________________________________           Sarahi Diego MD      Electronically signed by Sarahi Diego MD on 11/22/2021 at 7:55 PM

## 2021-11-23 NOTE — TELEPHONE ENCOUNTER
Medication:   Requested Prescriptions     Pending Prescriptions Disp Refills    blood glucose monitor strips 100 strip 0     Sig: Test  Blood sugar in am fasting & as needed for symptoms of irregular blood glucose. Dispense sufficient amount for indicated testing frequency plus additional to accommodate PRN testing needs.         Patient Phone Number: [phone] (home)     Last appt: 6/24/2021

## 2021-11-23 NOTE — TELEPHONE ENCOUNTER
----- Message from Naomi Steward sent at 11/23/2021  1:03 PM EST -----  Subject: Appointment Request    Reason for Call: Urgent Cough Cold    QUESTIONS  Type of Appointment? Established Patient  Reason for appointment request? No appointments available during search  Additional Information for Provider? PATIENT NEEDING APPOINTMENT WAAS   MARKED URGENT THROUGH CALL FLOW, NO APPOINTMENTS WERE AVALIABLE, TRIED THE   PRACTICE 5 NO ANSWER, SENT PATIENT TO THE A/H LINE. THANK YOU   ---------------------------------------------------------------------------  --------------  CALL BACK INFO  What is the best way for the office to contact you? OK to leave message on   voicemail  Preferred Call Back Phone Number?  [phone]  ---------------------------------------------------------------------------  --------------  SCRIPT ANSWERS

## 2021-11-24 NOTE — TELEPHONE ENCOUNTER
Agree for her to take otc cough med   Increase fluids  Cough drops.      If fever or chills go to  for eval

## 2021-11-26 ENCOUNTER — TELEPHONE (OUTPATIENT)
Dept: ORTHOPEDIC SURGERY | Age: 56
End: 2021-11-26

## 2021-11-26 NOTE — TELEPHONE ENCOUNTER
Surgery and/or Procedure Scheduling     Contact Name: Marisol Guajardo Request: SX on 11/22/21 on her Lt Knee   Patient Contact Number: [phone]    Patient stated she need to know how long will she have to wear the wrapping on her knee and can she starting doing some walking around.

## 2021-12-14 ENCOUNTER — TELEPHONE (OUTPATIENT)
Dept: ORTHOPEDIC SURGERY | Age: 56
End: 2021-12-14

## 2021-12-14 NOTE — TELEPHONE ENCOUNTER
Other PATIENT WANTS TO KNOW IF SHE CAN START TAKING IBUPROFEN AND IF SHE CAN TAKE EHR BRACE OFF   [phone]

## 2021-12-14 NOTE — TELEPHONE ENCOUNTER
I spoke with the patient and let her know that she can take her brace off, but can not take Ibuprofen until 6 weeks after the injection.

## 2022-01-17 ENCOUNTER — TELEPHONE (OUTPATIENT)
Dept: FAMILY MEDICINE CLINIC | Age: 57
End: 2022-01-17

## 2022-01-17 NOTE — TELEPHONE ENCOUNTER
----- Message from Joni Pappas sent at 1/14/2022  5:28 PM EST -----  Subject: Message to Provider    QUESTIONS  Information for Provider? Patient just tested positive for covid, and   would like to know if should could get some cough meds from PCP  ---------------------------------------------------------------------------  --------------  [address]  What is the best way for the office to contact you? OK to leave message on   voicemail  Preferred Call Back Phone Number? [phone]  ---------------------------------------------------------------------------  --------------  SCRIPT ANSWERS  Relationship to Patient?  Self

## 2022-01-17 NOTE — TELEPHONE ENCOUNTER
Pt went to hospital and received medicines. They gave her benzoate and advised that she use mucinex dm. Pt was also advised to drink plenty of fluids. She will call back if she needs anything else.

## 2022-01-19 ENCOUNTER — TELEPHONE (OUTPATIENT)
Dept: FAMILY MEDICINE CLINIC | Age: 57
End: 2022-01-19

## 2022-01-19 NOTE — TELEPHONE ENCOUNTER
Pt asking what she can take OTC for cough, head congestion with the meds that she is on    Please advise    CB:  [phone]

## 2022-01-24 ENCOUNTER — TELEMEDICINE (OUTPATIENT)
Dept: FAMILY MEDICINE CLINIC | Age: 57
End: 2022-01-24
Payer: COMMERCIAL

## 2022-01-24 DIAGNOSIS — H53.8 BLURRY VISION, BILATERAL: ICD-10-CM

## 2022-01-24 DIAGNOSIS — E11.9 TYPE 2 DIABETES MELLITUS WITHOUT COMPLICATION, WITH LONG-TERM CURRENT USE OF INSULIN (HCC): ICD-10-CM

## 2022-01-24 DIAGNOSIS — Z79.4 TYPE 2 DIABETES MELLITUS WITHOUT COMPLICATION, WITH LONG-TERM CURRENT USE OF INSULIN (HCC): ICD-10-CM

## 2022-01-24 DIAGNOSIS — U07.1 COVID-19: Primary | ICD-10-CM

## 2022-01-24 PROCEDURE — 99214 OFFICE O/P EST MOD 30 MIN: CPT | Performed by: FAMILY MEDICINE

## 2022-01-24 ASSESSMENT — ENCOUNTER SYMPTOMS
ABDOMINAL PAIN: 0
VISUAL CHANGE: 1

## 2022-01-24 NOTE — PROGRESS NOTES
Subjective:      Patient ID: Dat Yang is a 55 y.o. female. Dat Yang, was evaluated through a synchronous (real-time) audio-video encounter. The patient (or guardian if applicable) is aware that this is a billable service, which includes applicable co-pays. This Virtual Visit was conducted with patient's (and/or legal guardian's) consent. The visit was conducted pursuant to the emergency declaration under the Formerly named [facility], [address] [place] [address] authority and the [employer] Act. Patient identification was verified, and a caregiver was present when appropriate. The patient was located at home in a state where the provider was licensed to provide care. Total time spent for this encounter: Not billed by time    --Georgian Schirmer, MD on 1/24/2022 at 1:34 PM    An electronic signature was used to authenticate this note. Here for follow up from ED visit  Reason for visit: sinus congestion   Diagnosis given: Covid 19, hyperglycemia, dehydration , sinusitis   Treatment: amoxicillin, prednisone, albuterol, tessalon   Work up: blood work and XR chest (reviewed and interpreted for today's visit )     Recommended follow up: 1 week     Now with following symptoms     Other  This is a new (follow up from ED dx Covid ) problem. The current episode started 1 to 4 weeks ago (dx with Covid on Edilson 10 ). The problem occurs intermittently. The problem has been gradually improving. Associated symptoms include a visual change (blurry vision this am while watching tv, now resolved). Pertinent negatives include no abdominal pain, fatigue, headaches, numbness or weakness. Nothing aggravates the symptoms. She has tried acetaminophen (amoxicillin , prednisone ) for the symptoms. The treatment provided moderate relief. Review of Systems   Constitutional: Negative for fatigue. Eyes: Positive for visual disturbance. Gastrointestinal: Negative for abdominal pain. Neurological: Negative for dizziness, tremors, seizures, syncope, facial asymmetry, speech difficulty, weakness, light-headedness, numbness and headaches. Objective:   Physical Exam  Constitutional:       General: She is not in acute distress. Appearance: Normal appearance. She is not ill-appearing, toxic-appearing or diaphoretic. HENT:      Head: Normocephalic and atraumatic. Pulmonary:      Effort: No respiratory distress. Musculoskeletal:      Cervical back: Normal range of motion. Skin:     Coloration: Skin is not pale. Neurological:      General: No focal deficit present. Mental Status: She is alert and oriented to person, place, and time. Mental status is at baseline. Psychiatric:         Mood and Affect: Mood normal.         Behavior: Behavior normal.         Assessment:       Diagnosis Orders   1. COVID-19     2. Blurry vision, bilateral       3. DM         Plan:      1. Sx are improved  She finished quarantine   And no need for further oral steroids or antibiotic  patient agrees and verbalizes understanding    2. Resolved,   There were no focal neurological deficits and sx are resolved  Sec to hyperglycemia? Dry eye? I instructed her to go to ED if sx recur specially if other focal deficits. patient agrees and verbalizes understanding      3. Continue metformin    Fu ov next week .   patient agrees and verbalizes understanding              Ricky Shankar MD

## 2022-01-31 ENCOUNTER — TELEPHONE (OUTPATIENT)
Dept: FAMILY MEDICINE CLINIC | Age: 57
End: 2022-01-31

## 2022-01-31 DIAGNOSIS — E11.9 TYPE 2 DIABETES MELLITUS WITHOUT COMPLICATION, WITH LONG-TERM CURRENT USE OF INSULIN (HCC): Primary | ICD-10-CM

## 2022-01-31 DIAGNOSIS — Z79.4 TYPE 2 DIABETES MELLITUS WITHOUT COMPLICATION, WITH LONG-TERM CURRENT USE OF INSULIN (HCC): Primary | ICD-10-CM

## 2022-01-31 NOTE — TELEPHONE ENCOUNTER
----- Message from Jeaneth Haro sent at 1/31/2022  2:48 PM EST -----  Subject: Referral Request    QUESTIONS   Reason for referral request? Pt is coming in for her Flu shot on 2/4/2022   and says her PCP wants labs done as well. Pt would like a order for labs   Has the physician seen you for this condition before? No   Preferred Specialist (if applicable)? Do you already have an appointment scheduled? Yes  Select Scheduled Date? 2022-02-04  Select Scheduled Physician? Seng Traore   Additional Information for Provider? Pt is coming in for her Flu shot on   2/4/2022 and says her PCP wants labs done as well. Pt would like a order   for labs  ---------------------------------------------------------------------------  --------------  CALL BACK INFO  What is the best way for the office to contact you? OK to leave message on   voicemail  Preferred Call Back Phone Number?  [phone]

## 2022-02-14 ENCOUNTER — NURSE ONLY (OUTPATIENT)
Dept: FAMILY MEDICINE CLINIC | Age: 57
End: 2022-02-14
Payer: COMMERCIAL

## 2022-02-14 DIAGNOSIS — Z23 NEED FOR INFLUENZA VACCINATION: Primary | ICD-10-CM

## 2022-02-14 PROCEDURE — 90756 CCIIV4 VACC ABX FREE IM: CPT | Performed by: FAMILY MEDICINE

## 2022-02-14 PROCEDURE — 90471 IMMUNIZATION ADMIN: CPT | Performed by: FAMILY MEDICINE

## 2022-02-14 NOTE — PROGRESS NOTES
Vaccine Information Sheet, \"Influenza - Inactivated\"  given to Vinod Salter, or parent/legal guardian of  Vinod Salter and verbalized understanding. Patient responses:    Have you ever had a reaction to a flu vaccine? No  Do you have any current illness? No  Have you ever had Guillian Honaker Syndrome? No  Do you have a serious allergy to any of the follow: Neomycin, Polymyxin, Thimerosal, eggs or egg products? No    Flu vaccine given per order. Please see immunization tab. Risks and benefits explained. Current VIS given.

## 2022-02-22 ENCOUNTER — OFFICE VISIT (OUTPATIENT)
Dept: ORTHOPEDIC SURGERY | Age: 57
End: 2022-02-22

## 2022-02-22 VITALS — WEIGHT: 211 LBS | HEIGHT: 59 IN | BODY MASS INDEX: 42.54 KG/M2

## 2022-02-22 DIAGNOSIS — M25.551 RIGHT HIP PAIN: Primary | ICD-10-CM

## 2022-02-22 PROCEDURE — 99024 POSTOP FOLLOW-UP VISIT: CPT | Performed by: ORTHOPAEDIC SURGERY

## 2022-02-22 RX ORDER — ALBUTEROL SULFATE 90 UG/1
AEROSOL, METERED RESPIRATORY (INHALATION)
COMMUNITY
Start: 2022-01-10 | End: 2022-08-22

## 2022-02-22 NOTE — PROGRESS NOTES
Patient Name: Jose Franco MRN: <[id number]>   Age: 65 y.o. YOB: 1965   Sex: female         [address] Complaint   Patient presents with    Hip Pain     RIGHT HIP-No recent injury       HISTORY OF PRESENT ILLNESS   Patient returns for  postoperative visit status post genicular block and prp injection of their knee. Currently:     Doing well with chopat strap on the left knee and general mobilization. Injections have seemed to improve overall use of the leg and mobility without as much pain. Previously:       Patient has noted significant improvement in quadricep strength since previous visit and has noticed an moderate reduction in pain but still notes pain intermittently around the knees especially around weather changes and increased periods of activity. She does still note intermittent give way and buckling of the knee still utilizing the Cho-Pat knee strap brace and intermittently utilizing crutches or cane. She denies any falls although she has had close calls due to children pulling and tugging on crutches or cane. Noted mild swelling around the knee denies any numbness burning or tingling but does describe intermittent cold sensation around the knee states that comes and lasts for just a few minutes and then fades. Rates overall pain as a 4-5 out of 10. Says it can get up to a 9 with periods of activity. She notes improvement with utilization of ibuprofen and/or Tylenol as well as ice she is still utilizing the neuromuscular stimulator unit. And still doing exercises on a regular basis that she was instructed to do with physical therapy. .     Assessment   PHYSICAL EXAM   Vital Signs: Ht 4' 11\" (1.499 m)   Wt 211 lb (95.7 kg)   BMI 42.62 kg/m²   Examination of the knee shows a well-healed incisions      There is mild swelling. There is no drainage. Range of motion is 0-120°. There is no calf tenderness.   The patient is neurovascularly intact. Tenderness noted on the medial and lateral joint lines and along MCL and LCL. Mild bony tibial pain and femoral condyle pain both medially and laterally. Significant improvement in strength of the quadricep since previous visit. NEUROLOGICALLY: There is no evidence for sensory or motor deficits in the extremity. Coordination appears full with no spacticity or rigidity. Reflexes appear to be symmetric. Skin shows no rashes/ecchymosis to the affected area, no hyperesthesias, no discoloration, no temperature or color discrepancies. Distal circulation intact. No signs of RSD. IMPRESSION         post  left knee arthroscopy with quad weakness. Ipmroved. Now with post covid            PLAN   The patient is doing well   status post left knee arthroscopy and most recent prp injection         The patient will slowly resume normal activities.           Plan for continued work on infrapatellar tendinitis with chopat strap, local care and start of physical therapy

## 2022-03-10 ENCOUNTER — HOSPITAL ENCOUNTER (OUTPATIENT)
Dept: PHYSICAL THERAPY | Age: 57
Setting detail: THERAPIES SERIES
Discharge: HOME | End: 2022-03-10
Payer: COMMERCIAL

## 2022-03-10 PROCEDURE — 97161 PT EVAL LOW COMPLEX 20 MIN: CPT | Performed by: PHYSICAL THERAPIST

## 2022-03-10 PROCEDURE — 97110 THERAPEUTIC EXERCISES: CPT | Performed by: PHYSICAL THERAPIST

## 2022-03-10 NOTE — PLAN OF CARE
[facility] and [address],  Sports Performance and Rehabilitation, [facility] [address]  [address] [facility]   [address], [address] [place] [address]  Phone: [phone]  Fax: [phone]     Physical Therapy Certification    Dear Referring Practitioner: Manoj Booth MD,    We had the pleasure of evaluating the following patient for physical therapy services at [address] [place] [address]. A summary of our findings can be found in the initial assessment below. This includes our plan of care. If you have any questions or concerns regarding these findings, please do not hesitate to contact me at the office phone number checked above. Thank you for the referral.       Physician Signature:_______________________________Date:__________________  By signing above (or electronic signature), therapists plan is approved by physician      Patient: Marilee Bailey   : 1965   MRN: [id number]  Referring Physician: Referring Practitioner: Manoj Booth MD      Evaluation Date: 3/10/2022      Medical Diagnosis Information:  Diagnosis: M25.551 (ICD-10-CM) - Right hip pain   Treatment Diagnosis: M54.50, R53.1, R26.2                                         Insurance information: PT Insurance Information: Boubacar Santillan     Precautions/ Contra-indications:     C-SSRS Triggered by Intake questionnaire (Past 2 wk assessment):   [x] No, Questionnaire did not trigger screening.   [] Yes, Patient intake triggered further evaluation      [] C-SSRS Screening completed  [] PCP notified via Plan of Care  [] Emergency services notified     Latex Allergy:  [x]NO      []YES  Preferred Language for Healthcare:   [x]English       []other:    SUBJECTIVE: Patient stated complaint: Pt presents to PT with R hip/back pain. Pt saw Dr. Svetlana Mercado on , her pain had started the week before. Around the same time that her pain started she also switched rooms at [facility] with children.  Pt has flexors 3+/5 3+/5    Hip extension 3+/5 3+/5    Hip abduction 3+/5 3+/5    Hip adduction Not assessed Not assessed    Hip ER 4-/5 4-/5    Hip IR 4-/5 4-/5    Quads 4/5 4/5    Hamstrings 4/5 4/5      Joint mobility:    []Normal    [x]Hypo lumbar, thoracic   []Hyper    Palpation: Not assessed    Functional Mobility/Transfers: Pt independent, increased difficulty with bending, standing, walking and sitting    Posture: WFL    Gait: WFL                       [x] Patient history, allergies, meds reviewed. Medical chart reviewed. See intake form. Review Of Systems (ROS):  [x]Performed Review of systems (Integumentary, CardioPulmonary, Neurological) by intake and observation. Intake form has been scanned into medical record. Patient has been instructed to contact their primary care physician regarding ROS issues if not already being addressed at this time.       Co-morbidities/Complexities (which will affect course of rehabilitation):   []None           Arthritic conditions   []Rheumatoid arthritis (M05.9)  []Osteoarthritis (M19.91)   Cardiovascular conditions   []Hypertension (I10)  []Hyperlipidemia (E78.5)  []Angina pectoris (I20)  []Atherosclerosis (I70)   Musculoskeletal conditions   []Disc pathology   []Congenital spine pathologies   [x]Prior surgical intervention  []Osteoporosis (M81.8)  []Osteopenia (M85.8)   Endocrine conditions   []Hypothyroid (E03.9)  []Hyperthyroid Gastrointestinal conditions   []Constipation (C00.10)   Metabolic conditions   []Morbid obesity (E66.01)  []Diabetes type 1(E10.65) or 2 (E11.65)   []Neuropathy (G60.9)     Pulmonary conditions   []Asthma (J45)  []Coughing   []COPD (J44.9)   Psychological Disorders  []Anxiety (F41.9)  []Depression (F32.9)   []Other:   []Other:          Barriers to/and or personal factors that will affect rehab potential:              []Age  []Sex              []Motivation/Lack of Motivation                        []Co-Morbidities              []Cognitive Function, education/learning barriers              []Environmental, home barriers              [x]profession/work barriers  [x]past PT/medical experience  []other:  Justification: Pt has hx of knee surgery, pt has a physical job job    Falls Risk Assessment (30 days):   [x] Falls Risk assessed and no intervention required. [] Falls Risk assessed and Patient requires intervention due to being higher risk   TUG score (>12s at risk):     [] Falls education provided, including       ASSESSMENT:   Functional Impairments:     [x]Noted lumbar/proximal hip/LE hypomobility   [x]Decreased LE functional ROM   [x]Decreased core/proximal hip strength and neuromuscular control   [x]Decreased LE functional strength   [x]Reduced balance/proprioceptive control   []other:      Functional Activity Limitations (from functional questionnaire and intake)   [x]Reduced ability to tolerate prolonged functional positions   [x]Reduced ability or difficulty with changes of positions or transfers between positions   [x]Reduced ability to maintain good posture and demonstrate good body mechanics with sitting, bending, and lifting   [x]Reduced ability to sleep   [x] Reduced ability or tolerance with driving and/or computer work   [x]Reduced ability to perform lifting, carrying tasks   [x]Reduced ability to squat   [x]Reduced ability to forward bend   [x]Reduced ability to ambulate prolonged functional periods/distances/surfaces   [x]Reduced ability to ascend/descend stairs   [x]Reduced ability to run, hop or jump   []other:     Participation Restrictions   [x]Reduced participation in self care activities   [x]Reduced participation in home management activities   [x]Reduced participation in work activities   [x]Reduced participation in social activities. [x]Reduced participation in sport activities. Classification :    []Signs/symptoms consistent with post-surgical status including decreased ROM, strength and function.    []Signs/symptoms consistent and core   [x]  NMR activation and proprioception for LE, Glutes and Core   [x]  Manual therapy as indicated for LE, Hip and spine to include: Dry Needling/IASTM, STM, PROM, Gr I-IV mobilizations, manipulation. [x] Modalities as needed that may include: thermal agents, E-stim, Biofeedback, US, iontophoresis as indicated  [x] Patient education on joint protection, postural re-education, activity modification, progression of HEP. HEP instruction: Access Code: [email or web address]  URL: [email or web address]  Date: 03/10/2022  Prepared by: Prateek Corrales    Exercises  Supine Lower Trunk Rotation - 1 x daily - 7 x weekly - 1 sets - 10 reps - 30 second hold  Sidelying Thoracic Rotation with Open Book - 1 x daily - 7 x weekly - 1 sets - 10 reps - 30 second hold  Supine Figure 4 Piriformis Stretch - 1 x daily - 7 x weekly - 1 sets - 10 reps - 30 second hold  Clamshell - 1 x daily - 7 x weekly - 3 sets - 10 reps      GOALS:   Patient stated goal: get back to walking    [] Progressing: [] Met: [] Not Met: [] Adjusted    Therapist goals for Patient:   Short Term Goals: To be achieved in: 2 weeks 3/24/22  1. Independent in HEP and progression per patient tolerance, in order to prevent re-injury. [] Progressing: [] Met: [] Not Met: [] Adjusted   2. Patient will have a decrease in pain to facilitate improvement in movement, function, and ADLs as indicated by Functional Deficits. [] Progressing: [] Met: [] Not Met: [] Adjusted    Long Term Goals: To be achieved in: 8 weeks 5/5/22  1. Disability index score of 20% or less for the [place] to assist with reaching prior level of function. [] Progressing: [] Met: [] Not Met: [] Adjusted  2. Patient will demonstrate increased AROM to [facility] to allow for proper joint functioning as indicated by patients Functional Deficits. [] Progressing: [] Met: [] Not Met: [] Adjusted  3.  Patient will demonstrate an increase in BLE strength to 4/5 or greater to allow for proper functional mobility as indicated by patients Functional Deficits. [] Progressing: [] Met: [] Not Met: [] Adjusted  4. Patient will return to ADLs, IADLs and functional activities without increased symptoms or restriction. [] Progressing: [] Met: [] Not Met: [] Adjusted  5. Patient will be able to walk >1 mile and sit for > 60 min without back pain for improved completion of work tasks. [] Progressing: [] Met: [] Not Met: [] Adjusted      Electronically signed by:  Shamar Boyle, PT, DPT, OCS  Physical Therapist  [id number]  [email or web address]    Note: If patient does not return for scheduled/ recommended follow up visits, this note will serve as a discharge from care along with most recent update on progress.

## 2022-03-10 NOTE — FLOWSHEET NOTE
[facility] and [address],  Sports Performance and Rehabilitation, [facility] [address]  793 [facility],5Th Floor   [place]  Phone: [phone]  Fax: [phone]    Physical Therapy Daily Treatment Note  Date:  3/10/2022    Patient Name:  Gina Lovelace    :  1965  MRN: [id number]  Restrictions/Precautions:    Medical/Treatment Diagnosis Information:  · Diagnosis: M25.551 (ICD-10-CM) - Right hip pain  · Treatment Diagnosis: M54.50, R53.1, R49.9  Insurance/Certification information:  PT Insurance Information: [facility]  Physician Information:  Referring Practitioner: Joelle Hernandez MD  Has the plan of care been signed (Y/N):        []  Yes  [x]  No     Date of Patient follow up with Physician: not scheduled at this time      Is this a Progress Report:     []  Yes  [x]  No        If Yes:  Date Range for reporting period:  Beginning 3/10/22  Ending    Progress report will be due (10 Rx or 30 days whichever is less):  82      Recertification will be due (POC Duration  / 90 days whichever is less): 22         Visit # Insurance Allowable Auth Required   IE 61 []  Yes [x]  No        Functional Scale:    Date assessed:  Harrystacia 48/100=48% deficit   3/10/22       Latex Allergy:  [x]NO      []YES  Preferred Language for Healthcare:   [x]English       []other:      Pain level:  6-7/10     SUBJECTIVE:  See eval    OBJECTIVE:              ROM PROM AROM Comments     Left Right Left Right     Lumbar SB     4 inches from joint line 1 inch from joint line     Trunk rotation     Limited by 90% Limited by 90%     Trunk flex         Finger tips 4 inches from floor   Trunk ext         2 inches                                                Flexibility Left Right Comments   Hamstrings SLR 70 deg SLR 70 deg     ITB (Obers test) Not assessed Not assessed     Hip flexor(Brennen test) Not assessed Not assessed     gastroc Not assessed Not assessed     Rectus femoris(Elys test) Not assessed Not assessed                  Special  Test Left Right Comments   SLR - -     Crossed SLR - -     ANASTACIO + for tightness + for tightness                            Strength Left Right Comments   Hip flexors 3+/5 3+/5     Hip extension 3+/5 3+/5     Hip abduction 3+/5 3+/5     Hip adduction Not assessed Not assessed     Hip ER 4-/5 4-/5     Hip IR 4-/5 4-/5     Quads 4/5 4/5     Hamstrings 4/5 4/5        Joint mobility:               []?Normal                      [x]? Hypo lumbar, thoracic              []?Hyper     Palpation: Not assessed     Functional Mobility/Transfers: Pt independent, increased difficulty with bending, standing, walking and sitting     Posture: WFL     Gait: WFL       RESTRICTIONS/PRECAUTIONS:     Exercises/Interventions:     ROM/STRETCHES     Hooklying trunk rotation 2x30 each side    Sidelying open books 2x30 each side    Figure 4 2x30 each side                             PREs     clamshells 3x10 each side                                                           Manual interventions            Plan for next session: bridges, TA bracing, leg press    Therapeutic Exercise and NMR EXR  [x] (74718) Provided verbal/tactile cueing for activities related to strengthening, flexibility, endurance, ROM for improvements in LE, proximal hip, and core control with self care, mobility, lifting, ambulation.  [] (13550) Provided verbal/tactile cueing for activities related to improving balance, coordination, kinesthetic sense, posture, motor skill, proprioception  to assist with LE, proximal hip, and core control in self care, mobility, lifting, ambulation and eccentric single leg control.      NMR and Therapeutic Activities:    [] (53881 or 81273) Provided verbal/tactile cueing for activities related to improving balance, coordination, kinesthetic sense, posture, motor skill, proprioception and motor activation to allow for proper function of core, proximal hip and LE with self care and ADLs  [] (35623) Gait Re-education- Provided training and instruction to the patient for proper LE, core and proximal hip recruitment and positioning and eccentric body weight control with ambulation re-education including up and down stairs     Home Exercise Program:    Access Code: [email or web address]  URL: [email or web address]  Date: 03/10/2022  Prepared by: Jimmy Hammed     Exercises  · Supine Lower Trunk Rotation - 1 x daily - 7 x weekly - 1 sets - 3 reps - 30 second hold  · Sidelying Thoracic Rotation with Open Book - 1 x daily - 7 x weekly - 1 sets - 3 reps - 30 second hold  · Supine Figure 4 Piriformis Stretch - 1 x daily - 7 x weekly - 1 sets - 3 reps - 30 second hold  · Clamshell - 1 x daily - 7 x weekly - 3 sets - 10 reps    [x] (59387) Reviewed/Progressed HEP activities related to strengthening, flexibility, endurance, ROM of core, proximal hip and LE for functional self-care, mobility, lifting and ambulation/stair navigation   [] (98055)Reviewed/Progressed HEP activities related to improving balance, coordination, kinesthetic sense, posture, motor skill, proprioception of core, proximal hip and LE for self care, mobility, lifting, and ambulation/stair navigation      Manual Treatments:    [] (36231) Provided manual therapy to mobilize LE, proximal hip and/or LS spine soft tissue/joints for the purpose of modulating pain, promoting relaxation,  increasing ROM, reducing/eliminating soft tissue swelling/inflammation/restriction, improving soft tissue extensibility and allowing for proper ROM for normal function with self care, mobility, lifting and ambulation.      Modalities:   15' ice    Charges:  Timed Code Treatment Minutes: 23   Total Treatment Minutes: 55   Time in: 5:50  Time out: 6:45    [] EVAL (LOW) 54119 (typically 20 minutes face-to-face)  [] EVAL (MOD) 15633 (typically 30 minutes face-to-face)  [] EVAL (HIGH) 30356 (typically 45 minutes face-to-face)  [] RE-EVAL     [] EV(86818) x [] IONTO  [] NMR (74610) x     [] VASO  [] Manual (62169) x      [] Other:  [] TA x      [] Mech Traction (43524)  [] ES(attended) (86130)      [] ES (un) (81435):     GOALS:   Patient stated goal: get back to walking    []? Progressing: []? Met: []? Not Met: []? Adjusted     Therapist goals for Patient:   Short Term Goals: To be achieved in: 2 weeks 3/24/22  1. Independent in HEP and progression per patient tolerance, in order to prevent re-injury. []? Progressing: []? Met: []? Not Met: []? Adjusted   2. Patient will have a decrease in pain to facilitate improvement in movement, function, and ADLs as indicated by Functional Deficits. []? Progressing: []? Met: []? Not Met: []? Adjusted     Long Term Goals: To be achieved in: 8 weeks 5/5/22  1. Disability index score of 20% or less for the [place] to assist with reaching prior level of function. []? Progressing: []? Met: []? Not Met: []? Adjusted  2. Patient will demonstrate increased AROM to [facility] to allow for proper joint functioning as indicated by patients Functional Deficits. []? Progressing: []? Met: []? Not Met: []? Adjusted  3. Patient will demonstrate an increase in BLE strength to 4/5 or greater to allow for proper functional mobility as indicated by patients Functional Deficits. []? Progressing: []? Met: []? Not Met: []? Adjusted  4. Patient will return to ADLs, IADLs and functional activities without increased symptoms or restriction. []? Progressing: []? Met: []? Not Met: []? Adjusted  5. Patient will be able to walk >1 mile and sit for > 60 min without back pain for improved completion of work tasks. []? Progressing: []? Met: []? Not Met: []? Adjusted       Overall Progression Towards Functional goals/ Treatment Progress Update:  [] Patient is progressing as expected towards functional goals listed. [] Progression is slowed due to complexities/Impairments listed. [] Progression has been slowed due to co-morbidities.   [x] Plan just implemented, too soon to assess goals progression <30days   [] Goals require adjustment due to lack of progress  [] Patient is not progressing as expected and requires additional follow up with physician  [] Other    Prognosis for POC: [x] Good [] Fair  [] Poor      Patient requires continued skilled intervention: [x] Yes  [] No    Treatment/Activity Tolerance:  [x] Patient able to complete treatment  [] Patient limited by fatigue  [] Patient limited by pain     [] Patient limited by other medical complications  [] Other:         PLAN: See eval  [] Continue per plan of care [] Alter current plan (see comments above)  [x] Plan of care initiated [] Hold pending MD visit [] Discharge      Electronically signed by:  Joyce Augustine, PT, DPT, OCS  Physical Therapist  [id number]  [email or web address]      Note: If patient does not return for scheduled/ recommended follow up visits, this note will serve as a discharge from care along with most recent update on progress.

## 2022-03-15 ENCOUNTER — HOSPITAL ENCOUNTER (OUTPATIENT)
Dept: PHYSICAL THERAPY | Age: 57
Setting detail: THERAPIES SERIES
Discharge: HOME | End: 2022-03-15
Payer: COMMERCIAL

## 2022-03-15 PROCEDURE — 97110 THERAPEUTIC EXERCISES: CPT | Performed by: PHYSICAL THERAPIST

## 2022-03-15 PROCEDURE — 97112 NEUROMUSCULAR REEDUCATION: CPT | Performed by: PHYSICAL THERAPIST

## 2022-03-15 NOTE — FLOWSHEET NOTE
The [address] [facility] and [address],  Sports Performance and Rehabilitation, [facility] [address]  [address] [facility],5[address]   [place], [place]  Phone: [phone]  Fax: [phone]    Physical Therapy Daily Treatment Note  Date:  3/15/2022    Patient Name:  Mine Corbett    :  1965  MRN: [id number]  Restrictions/Precautions:    Medical/Treatment Diagnosis Information:  · Diagnosis: M25.551 (ICD-10-CM) - Right hip pain  · Treatment Diagnosis: M54.50, R53.1, Z30.2  Insurance/Certification information:  PT Insurance Information: [facility]  Physician Information:  Referring Practitioner: Riccardo Coughlin MD  Has the plan of care been signed (Y/N):        []  Yes  [x]  No     Date of Patient follow up with Physician: not scheduled at this time      Is this a Progress Report:     []  Yes  [x]  No        If Yes:  Date Range for reporting period:  Beginning 3/10/22  Ending    Progress report will be due (10 Rx or 30 days whichever is less):        Recertification will be due (POC Duration  / 90 days whichever is less): 22         Visit # Insurance Allowable Auth Required   IE +1 60 []  Yes [x]  No        Functional Scale:    Date assessed:  Harrystacia 48/100=48% deficit   3/10/22       Latex Allergy:  [x]NO      []YES  Preferred Language for Healthcare:   [x]English       []other:      Pain level:  6-7/10     SUBJECTIVE:  Pt states that she is feeling better since IE, doing HEP every morning.  She does still have some pain at the end of the day but has found that stretching in the AM helps her throughout the day, when pain is present it is less intense compared to before starting PT.     OBJECTIVE:              ROM PROM AROM Comments     Left Right Left Right     Lumbar SB     4 inches from joint line 1 inch from joint line     Trunk rotation     Limited by 90% Limited by 90%     Trunk flex         Finger tips 4 inches from floor   Trunk ext         2 inches                                                Flexibility Left Right Comments   Hamstrings SLR 70 deg SLR 70 deg     ITB (Obers test) Not assessed Not assessed     Hip flexor(Brennen test) Not assessed Not assessed     gastroc Not assessed Not assessed     Rectus femoris(Elys test) Not assessed Not assessed                  Special  Test Left Right Comments   SLR - -     Crossed SLR - -     ANASTACIO + for tightness + for tightness                            Strength Left Right Comments   Hip flexors 3+/5 3+/5     Hip extension 3+/5 3+/5     Hip abduction 3+/5 3+/5     Hip adduction Not assessed Not assessed     Hip ER 4-/5 4-/5     Hip IR 4-/5 4-/5     Quads 4/5 4/5     Hamstrings 4/5 4/5        Joint mobility:               []?Normal                      [x]? Hypo lumbar, thoracic              []?Hyper     Palpation: Not assessed     Functional Mobility/Transfers: Pt independent, increased difficulty with bending, standing, walking and sitting     Posture: WFL     Gait: WFL       RESTRICTIONS/PRECAUTIONS:     Exercises/Interventions:     ROM/STRETCHES     Hooklying trunk rotation 2x30 each side    Sidelying open books 2x30 each side    Figure 4 2x30 each side                             PREs     TA brace 10x10\"    Glute sets 10x10\"    ADD sets 10x10\"    clamshells 3x10 each side    Bridges Attempted- LBP              Leg press 3x10 DL 55#                                       Manual interventions            Plan for next session: bridges, TA bracing, leg press    Therapeutic Exercise and NMR EXR  [x] (66505) Provided verbal/tactile cueing for activities related to strengthening, flexibility, endurance, ROM for improvements in LE, proximal hip, and core control with self care, mobility, lifting, ambulation.  [] (64505) Provided verbal/tactile cueing for activities related to improving balance, coordination, kinesthetic sense, posture, motor skill, proprioception  to assist with LE, proximal hip, and core control in self care, mobility, lifting, ambulation and eccentric single leg control. NMR and Therapeutic Activities:    [] (23617 or 26102) Provided verbal/tactile cueing for activities related to improving balance, coordination, kinesthetic sense, posture, motor skill, proprioception and motor activation to allow for proper function of core, proximal hip and LE with self care and ADLs  [] (52143) Gait Re-education- Provided training and instruction to the patient for proper LE, core and proximal hip recruitment and positioning and eccentric body weight control with ambulation re-education including up and down stairs     Home Exercise Program:    Access Code: [email or web address]  URL: [email or web address]  Updated: 03/15/2022  Prepared by: Vj Apt      Exercises  · Supine Lower Trunk Rotation - 1 x daily - 7 x weekly - 1 sets - 3 reps - 30 second hold  · Sidelying Thoracic Rotation with Open Book - 1 x daily - 7 x weekly - 1 sets - 3 reps - 30 second hold  · Supine Figure 4 Piriformis Stretch - 1 x daily - 7 x weekly - 1 sets - 3 reps - 30 second hold  · Clamshell - 1 x daily - 7 x weekly - 3 sets - 10 reps  · Supine Hip Adduction Isometric with Ball - 1 x daily - 7 x weekly - 1 sets - 10 reps - 10 second hold  · Supine Gluteal Sets - 1 x daily - 7 x weekly - 1 sets - 10 reps - 10 second hold  · Supine Transversus Abdominis Bracing - Hands on Stomach - 1 x daily - 7 x weekly - 1 sets - 10 reps - 10 second hold    [x] (04144) Reviewed/Progressed HEP activities related to strengthening, flexibility, endurance, ROM of core, proximal hip and LE for functional self-care, mobility, lifting and ambulation/stair navigation   [] (30213)Reviewed/Progressed HEP activities related to improving balance, coordination, kinesthetic sense, posture, motor skill, proprioception of core, proximal hip and LE for self care, mobility, lifting, and ambulation/stair navigation      Manual Treatments:    [] (76927) Provided manual therapy to mobilize LE, proximal hip and/or LS spine soft tissue/joints for the purpose of modulating pain, promoting relaxation,  increasing ROM, reducing/eliminating soft tissue swelling/inflammation/restriction, improving soft tissue extensibility and allowing for proper ROM for normal function with self care, mobility, lifting and ambulation. Modalities:   10' ice    Charges:  Timed Code Treatment Minutes: 31   Total Treatment Minutes: 41   Time in: 5:03  Time out: 5:44    [] EVAL (LOW) 23797 (typically 20 minutes face-to-face)  [] EVAL (MOD) 22744 (typically 30 minutes face-to-face)  [] EVAL (HIGH) 38607 (typically 45 minutes face-to-face)  [] RE-EVAL     [x] UY(07862) x 1    [] IONTO  [x] NMR (25633) x  1   [] VASO  [] Manual (49387) x      [] Other:  [] TA x      [] Mech Traction (29174)  [] ES(attended) (58877)      [] ES (un) (96307):     GOALS:   Patient stated goal: get back to walking    []? Progressing: []? Met: []? Not Met: []? Adjusted     Therapist goals for Patient:   Short Term Goals: To be achieved in: 2 weeks 3/24/22  1. Independent in HEP and progression per patient tolerance, in order to prevent re-injury. []? Progressing: []? Met: []? Not Met: []? Adjusted   2. Patient will have a decrease in pain to facilitate improvement in movement, function, and ADLs as indicated by Functional Deficits. []? Progressing: []? Met: []? Not Met: []? Adjusted     Long Term Goals: To be achieved in: 8 weeks 5/5/22  1. Disability index score of 20% or less for the [place] to assist with reaching prior level of function. []? Progressing: []? Met: []? Not Met: []? Adjusted  2. [facility] will [facility] to allow for proper joint functioning as indicated by patients Functional Deficits. []? Progressing: []? Met: []? Not Met: []? Adjusted  3. Patient will demonstrate an increase in BLE strength to 4/5 or greater to allow for proper functional mobility as indicated by patients Functional Deficits.    []? Progressing: []? Met: []? Not Met: []? Adjusted  4. Patient will return to ADLs, IADLs and functional activities without increased symptoms or restriction. []? Progressing: []? Met: []? Not Met: []? Adjusted  5. Patient will be able to walk >1 mile and sit for > 60 min without back pain for improved completion of work tasks. []? Progressing: []? Met: []? Not Met: []? Adjusted       Overall Progression Towards Functional goals/ Treatment Progress Update:  [] Patient is progressing as expected towards functional goals listed. [] Progression is slowed due to complexities/Impairments listed. [] Progression has been slowed due to co-morbidities. [x] Plan just implemented, too soon to assess goals progression <30days   [] Goals require adjustment due to lack of progress  [] Patient is not progressing as expected and requires additional follow up with physician  [] Other    Prognosis for POC: [x] Good [] Fair  [] Poor      Patient requires continued skilled intervention: [x] Yes  [] No    Treatment/Activity Tolerance:  [x] Patient able to complete treatment  [] Patient limited by fatigue  [] Patient limited by pain     [] Patient limited by other medical complications  [] Other: Pt was performing clamshells without rest break, noted too difficult to do more than 20, reviewed proper reps per set and rest times. Encouraged pt to alternate between clams and open books to allow for longer recovery time for glute muscles, pt tolerated well and was able to complete all 3 sets. Pt tolerated isometric exercises well and without pain. Attempted bridges but pt reported low back pain so discontinued. Pt tolerated leg press but did have some increased pain afterwards, despite this pt reported that she feels this is a good exercise and likely hurt because she is not used to pushing weight. Pt notes relief with ice pack. Pt requires PT follow up to address ROM, strength and functional mobility deficits.         PLAN: See maria teresaal  [x] Continue per plan of care [] Alter current plan (see comments above)  [] Plan of care initiated [] Hold pending MD visit [] Discharge      Electronically signed by:  Mark Ragland PT, DPT, OCS  Physical Therapist  [id number]  [email or web address]      Note: If patient does not return for scheduled/ recommended follow up visits, this note will serve as a discharge from care along with most recent update on progress.

## 2022-03-22 ENCOUNTER — HOSPITAL ENCOUNTER (OUTPATIENT)
Dept: PHYSICAL THERAPY | Age: 57
Setting detail: THERAPIES SERIES
Discharge: HOME | End: 2022-03-22
Payer: COMMERCIAL

## 2022-03-22 PROCEDURE — 97110 THERAPEUTIC EXERCISES: CPT | Performed by: PHYSICAL THERAPIST

## 2022-03-22 PROCEDURE — 97112 NEUROMUSCULAR REEDUCATION: CPT | Performed by: PHYSICAL THERAPIST

## 2022-03-22 NOTE — FLOWSHEET NOTE
The [facility] and [address],  Sports Performance and Rehabilitation, [facility] [address]  [address] [facility],5Th Floor   Susana León  Phone: [phone]  Fax: [phone]    Physical Therapy Daily Treatment Note  Date:  3/22/2022    Patient Name:  Myrna Fan    :  1965  MRN: [id number]  Restrictions/Precautions:    Medical/Treatment Diagnosis Information:  · Diagnosis: M25.551 (ICD-10-CM) - Right hip pain  · Treatment Diagnosis: M54.50, R53.1, X93.7  Insurance/Certification information:  PT Insurance Information: [facility]  Physician Information:  Referring Practitioner: Kyree Lerma MD  Has the plan of care been signed (Y/N):        []  Yes  [x]  No     Date of Patient follow up with Physician: not scheduled at this time      Is this a Progress Report:     []  Yes  [x]  No        If Yes:  Date Range for reporting period:  Beginning 3/10/22  Ending    Progress report will be due (10 Rx or 30 days whichever is less):        Recertification will be due (POC Duration  / 90 days whichever is less): 22         Visit # Insurance Allowable Auth Required   IE +1 60 []  Yes [x]  No        Functional Scale:    Date assessed:  [facility] 48/100=48% deficit   3/10/22       Latex Allergy:  [x]NO      []YES  Preferred Language for Healthcare:   [x]English       []other:      Pain level:  6-7/10     SUBJECTIVE:  Pt states that she was working in her garage yesterday, organizing things for about an hour, she had increased soreness in her back after that. 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 Pt states that her knees were sore following LPV, thinks from leg press, her back felt okay rest of night following the ice at the end of the session.     OBJECTIVE:              ROM PROM AROM Comments     Left Right Left Right     Lumbar SB     4 inches from joint line 1 inch from joint line     Trunk rotation     Limited by 90% Limited by 90%     Trunk flex         Finger tips 4 inches from floor   Trunk ext         2 inches                                                Flexibility Left Right Comments   Hamstrings SLR 70 deg SLR 70 deg     ITB (Obers test) Not assessed Not assessed     Hip flexor(Brennen test) Not assessed Not assessed     gastroc Not assessed Not assessed     Rectus femoris(Elys test) Not assessed Not assessed                  Special  Test Left Right Comments   SLR - -     Crossed SLR - -     ANASTACIO + for tightness + for tightness                            Strength Left Right Comments   Hip flexors 3+/5 3+/5     Hip extension 3+/5 3+/5     Hip abduction 3+/5 3+/5     Hip adduction Not assessed Not assessed     Hip ER 4-/5 4-/5     Hip IR 4-/5 4-/5     Quads 4/5 4/5     Hamstrings 4/5 4/5        Joint mobility:               []?Normal                      [x]? Hypo lumbar, thoracic              []?Hyper     Palpation: Not assessed     Functional Mobility/Transfers: Pt independent, increased difficulty with bending, standing, walking and sitting     Posture: WFL     Gait: WFL       RESTRICTIONS/PRECAUTIONS:     Exercises/Interventions:     ROM/STRETCHES     Hooklying trunk rotation 2x30\" each side    Sidelying open books 2x30\" each side    Figure 4 2x30\" each side                             PREs     TA brace    Glute sets    ADD sets    clamshells 3x10 each side    Bridges Attempted- LBP              TA Heel slide x5 B    TA march 2x5 B    TA OH reach 2x5 playground ball              Leg press  Hold d/t pain at 3/15 visit   Knee ext 3/22 Attempted 10#- p!     Hamstring curl 3x10 20#                             Manual interventions            Plan for next session: progress as tolerated    Therapeutic Exercise and NMR EXR  [x] (40665) Provided verbal/tactile cueing for activities related to strengthening, flexibility, endurance, ROM for improvements in LE, proximal hip, and core control with self care, mobility, lifting, ambulation.  [] (61041) Provided verbal/tactile cueing for activities related to improving balance, coordination, kinesthetic sense, posture, motor skill, proprioception  to assist with LE, proximal hip, and core control in self care, mobility, lifting, ambulation and eccentric single leg control. NMR and Therapeutic Activities:    [] (18076 or 75632) Provided verbal/tactile cueing for activities related to improving balance, coordination, kinesthetic sense, posture, motor skill, proprioception and motor activation to allow for proper function of core, proximal hip and LE with self care and ADLs  [] (99787) Gait Re-education- Provided training and instruction to the patient for proper LE, core and proximal hip recruitment and positioning and eccentric body weight control with ambulation re-education including up and down stairs     Home Exercise Program:    Access Code: [email or web address]  URL: [email or web address]  Updated: 03/22/2022  Prepared by: Susana Sparks      Exercises  · Supine Lower Trunk Rotation - 1 x daily - 7 x weekly - 1 sets - 3 reps - 30 second hold  · Sidelying Thoracic Rotation with Open Book - 1 x daily - 7 x weekly - 1 sets - 3 reps - 30 second hold  · Supine Figure 4 Piriformis Stretch - 1 x daily - 7 x weekly - 1 sets - 3 reps - 30 second hold  · Clamshell - 1 x daily - 7 x weekly - 3 sets - 10 reps  · Supine Hip Adduction Isometric with Ball - 1 x daily - 7 x weekly - 1 sets - 10 reps - 10 second hold  · Supine Gluteal Sets - 1 x daily - 7 x weekly - 1 sets - 10 reps - 10 second hold  · Supine Shoulder Flexion Extension AAROM with Dowel - 1 x daily - 7 x weekly - 2 sets - 5 reps  · Supine Transversus Abdominis Bracing with Heel Slide - 1 x daily - 7 x weekly - 2 sets - 5 reps  · Supine March - 1 x daily - 7 x weekly - 2 sets - 5 reps    [x] (09872) Reviewed/Progressed HEP activities related to strengthening, flexibility, endurance, ROM of core, proximal hip and LE for functional self-care, mobility, lifting and ambulation/stair navigation   [] (27397)Reviewed/Progressed HEP activities related to improving balance, coordination, kinesthetic sense, posture, motor skill, proprioception of core, proximal hip and LE for self care, mobility, lifting, and ambulation/stair navigation      Manual Treatments:    [] (90672) Provided manual therapy to mobilize LE, proximal hip and/or LS spine soft tissue/joints for the purpose of modulating pain, promoting relaxation,  increasing ROM, reducing/eliminating soft tissue swelling/inflammation/restriction, improving soft tissue extensibility and allowing for proper ROM for normal function with self care, mobility, lifting and ambulation. Modalities:   Pt will ice at home    Charges:  Timed Code Treatment Minutes: 41   Total Treatment Minutes: 41   Time in: 5:04  Time out: 5:45    [] EVAL (LOW) 02732 (typically 20 minutes face-to-face)  [] EVAL (MOD) 31749 (typically 30 minutes face-to-face)  [] EVAL (HIGH) 81871 (typically 45 minutes face-to-face)  [] RE-EVAL     [x] NM(15816) x 2    [] IONTO  [x] NMR (21467) x  1   [] VASO  [] Manual (53932) x      [] Other:  [] TA x      [] Mech Traction (88973)  [] ES(attended) (74833)      [] ES (un) (98730):     GOALS:   Patient stated goal: get back to walking    []? Progressing: []? Met: []? Not Met: []? Adjusted     Therapist goals for Patient:   Short Term Goals: To be achieved in: 2 weeks 3/24/22  1. Independent in HEP and progression per patient tolerance, in order to prevent re-injury. []? Progressing: []? Met: []? Not Met: []? Adjusted   2. Patient will have a decrease in pain to facilitate improvement in movement, function, and ADLs as indicated by Functional Deficits. []? Progressing: []? Met: []? Not Met: []? Adjusted     Long Term Goals: To be achieved in: 8 weeks 5/5/22  1. Disability index score of 20% or less for the [place] to assist with reaching prior level of function.    []? holding breath. Attempted knee ext on weight machine, unable to tolerate d/t weakness and pain. Performed LAQ with 1# and pt noted lateral knee discomfort instead of quad fatigue. Pt tolerated HS curls without pain, did have fatigue by final set and was not able to move through full ROM. Pt requires PT follow up to address ROM, strength and functional mobility deficits. PLAN: See eval  [x] Continue per plan of care [] Alter current plan (see comments above)  [] Plan of care initiated [] Hold pending MD visit [] Discharge      Electronically signed by:  Aubree Medellin, PT, DPT, OCS  Physical Therapist  [id number]  [email or web address]      Note: If patient does not return for scheduled/ recommended follow up visits, this note will serve as a discharge from care along with most recent update on progress.

## 2022-03-29 ENCOUNTER — HOSPITAL ENCOUNTER (OUTPATIENT)
Dept: PHYSICAL THERAPY | Age: 57
Setting detail: THERAPIES SERIES
Discharge: HOME | End: 2022-03-29
Payer: COMMERCIAL

## 2022-03-29 PROCEDURE — 97112 NEUROMUSCULAR REEDUCATION: CPT | Performed by: PHYSICAL THERAPIST

## 2022-03-29 PROCEDURE — 97110 THERAPEUTIC EXERCISES: CPT | Performed by: PHYSICAL THERAPIST

## 2022-03-29 PROCEDURE — 97140 MANUAL THERAPY 1/> REGIONS: CPT | Performed by: PHYSICAL THERAPIST

## 2022-03-29 NOTE — FLOWSHEET NOTE
The [facility] and [address],  Sports Performance and Rehabilitation, [address]  [address] [facility],5[address] Floor   [place], [address] [place]  Phone: [phone]  Fax: [phone]    Physical Therapy Daily Treatment Note  Date:  3/29/2022    Patient Name:  Kendra Ni    :  1965  MRN: [id number]  Restrictions/Precautions:    Medical/Treatment Diagnosis Information:  · Diagnosis: M25.551 (ICD-10-CM) - Right hip pain  · Treatment Diagnosis: M54.50, R53.1, G46.1  Insurance/Certification information:  PT Insurance Information: [facility]  Physician Information:  Referring Practitioner: Sofie Naranjo MD  Has the plan of care been signed (Y/N):        []  Yes  [x]  No     Date of Patient follow up with Physician: not scheduled at this time      Is this a Progress Report:     []  Yes  [x]  No        If Yes:  Date Range for reporting period:  Beginning 3/10/22  Ending    Progress report will be due (10 Rx or 30 days whichever is less):        Recertification will be due (POC Duration  / 90 days whichever is less): 22         Visit # Insurance Allowable Auth Required   IE +2 60 []  Yes [x]  No        Functional Scale:    Date assessed:  [facility] 48/100=48% deficit   3/10/22       Latex Allergy:  [x]NO      []YES  Preferred Language for Healthcare:   [x]English       []other:      Pain level:  6-7/10     SUBJECTIVE: \"My back is giving me a little trouble today, I don't know if it's the weather or the kids. \" Her pain is in the middle of the back. She needed to sit down a lot during the work day. Pt is not using crutch as much anymore at work.      OBJECTIVE:              ROM PROM AROM Comments     Left Right Left Right     Lumbar SB     4 inches from joint line 1 inch from joint line     Trunk rotation     Limited by 90% Limited by 90%     Trunk flex         Finger tips 4 inches from floor   Trunk ext         2 inches                                              Flexibility Left Right Comments   Hamstrings SLR 70 deg SLR 70 deg     ITB (Obers test) Not assessed Not assessed     Hip flexor(Brennen test) Not assessed Not assessed     gastroc Not assessed Not assessed     Rectus femoris(Elys test) Not assessed Not assessed                  Special  Test Left Right Comments   SLR - -     Crossed SLR - -     ANASTACIO + for tightness + for tightness                            Strength Left Right Comments   Hip flexors 3+/5 3+/5     Hip extension 3+/5 3+/5     Hip abduction 3+/5 3+/5     Hip adduction Not assessed Not assessed     Hip ER 4-/5 4-/5     Hip IR 4-/5 4-/5     Quads 4/5 4/5     Hamstrings 4/5 4/5        Joint mobility:               []?Normal                      [x]? Hypo lumbar, thoracic              []?Hyper     Palpation: Not assessed     Functional Mobility/Transfers: Pt independent, increased difficulty with bending, standing, walking and sitting     Posture: WFL     Gait: WFL       RESTRICTIONS/PRECAUTIONS:     Exercises/Interventions:     ROM/STRETCHES     Hooklying trunk rotation 2x30\" each side    Sidelying open books 2x30\" each side    Figure 4 2x30\" each side    Knee to chest 2x30\" each side    Seated cat cow x10                   PREs     TA brace    Glute sets    ADD sets    clamshells 3x10 each side    Bridges Attempted- LBP              TA Heel slide 2x5 B    TA march 3x5 B    TA OH reach 3x5 playground ball              Leg press  Hold d/t pain at 3/15 visit   Knee ext     Hamstring curl 3x10 20#                             Manual interventions     IASTM 8' T10-L3 paraspinals HG 9: brushing     Plan for next session: progress as tolerated    Therapeutic Exercise and NMR EXR  [x] (85318) Provided verbal/tactile cueing for activities related to strengthening, flexibility, endurance, ROM for improvements in LE, proximal hip, and core control with self care, mobility, lifting, ambulation.  [] (24356) Provided verbal/tactile cueing for activities related to improving balance, coordination, kinesthetic sense, posture, motor skill, proprioception  to assist with LE, proximal hip, and core control in self care, mobility, lifting, ambulation and eccentric single leg control. NMR and Therapeutic Activities:    [] (60078 or 85921) Provided verbal/tactile cueing for activities related to improving balance, coordination, kinesthetic sense, posture, motor skill, proprioception and motor activation to allow for proper function of core, proximal hip and LE with self care and ADLs  [] (22682) Gait Re-education- Provided training and instruction to the patient for proper LE, core and proximal hip recruitment and positioning and eccentric body weight control with ambulation re-education including up and down stairs     Home Exercise Program:    Access Code: [email or web address]  URL: [email or web address]  Updated: 03/22/2022  Prepared by: Ryan Bui      Exercises  · Supine Lower Trunk Rotation - 1 x daily - 7 x weekly - 1 sets - 3 reps - 30 second hold  · Sidelying Thoracic Rotation with Open Book - 1 x daily - 7 x weekly - 1 sets - 3 reps - 30 second hold  · Supine Figure 4 Piriformis Stretch - 1 x daily - 7 x weekly - 1 sets - 3 reps - 30 second hold  · Clamshell - 1 x daily - 7 x weekly - 3 sets - 10 reps  · Supine Hip Adduction Isometric with Ball - 1 x daily - 7 x weekly - 1 sets - 10 reps - 10 second hold  · Supine Gluteal Sets - 1 x daily - 7 x weekly - 1 sets - 10 reps - 10 second hold  · Supine Shoulder Flexion Extension AAROM with Dowel - 1 x daily - 7 x weekly - 2 sets - 5 reps  · Supine Transversus Abdominis Bracing with Heel Slide - 1 x daily - 7 x weekly - 2 sets - 5 reps  · Supine March - 1 x daily - 7 x weekly - 2 sets - 5 reps    [x] (34293) Reviewed/Progressed HEP activities related to strengthening, flexibility, endurance, ROM of core, proximal hip and LE for functional self-care, mobility, lifting and ambulation/stair navigation   [] (82073)Reviewed/Progressed HEP activities related to improving balance, coordination, kinesthetic sense, posture, motor skill, proprioception of core, proximal hip and LE for self care, mobility, lifting, and ambulation/stair navigation      Manual Treatments:    [] (04279) Provided manual therapy to mobilize LE, proximal hip and/or LS spine soft tissue/joints for the purpose of modulating pain, promoting relaxation,  increasing ROM, reducing/eliminating soft tissue swelling/inflammation/restriction, improving soft tissue extensibility and allowing for proper ROM for normal function with self care, mobility, lifting and ambulation. Modalities:   10'    Charges:  Timed Code Treatment Minutes: 45   Total Treatment Minutes: 55   Time in: 5:40  Time out: 6:35    [] EVAL (LOW) 45706 (typically 20 minutes face-to-face)  [] EVAL (MOD) 58496 (typically 30 minutes face-to-face)  [] EVAL (HIGH) 45116 (typically 45 minutes face-to-face)  [] RE-EVAL     [x] QQ(02696) x 1    [] IONTO  [x] NMR (56505) x  1   [] VASO  [x] Manual (79217) x 1      [] Other:  [] TA x      [] Mech Traction (66880)  [] ES(attended) (13867)      [] ES (un) (55829):     GOALS:   Patient stated goal: get back to walking    []? Progressing: []? Met: []? Not Met: []? Adjusted     Therapist goals for Patient:   Short Term Goals: To be achieved in: 2 weeks 3/24/22  1. Independent in HEP and progression per patient tolerance, in order to prevent re-injury. []? Progressing: []? Met: []? Not Met: []? Adjusted   2. Patient will have a decrease in pain to facilitate improvement in movement, function, and ADLs as indicated by Functional Deficits. []? Progressing: []? Met: []? Not Met: []? Adjusted     Long Term Goals: To be achieved in: 8 weeks 5/5/22  1. Disability index score of 20% or less for the [place] to assist with reaching prior level of function. []? Progressing: []? Met: []? Not Met: []? Adjusted  2.  Patient will demonstrate increased AROM to WFL to allow for proper joint functioning as indicated by patients Functional Deficits. []? Progressing: []? Met: []? Not Met: []? Adjusted  3. Patient will demonstrate an increase in BLE strength to 4/5 or greater to allow for proper functional mobility as indicated by patients Functional Deficits. []? Progressing: []? Met: []? Not Met: []? Adjusted  4. Patient will return to ADLs, IADLs and functional activities without increased symptoms or restriction. []? Progressing: []? Met: []? Not Met: []? Adjusted  5. Patient will be able to walk >1 mile and sit for > 60 min without back pain for improved completion of work tasks. []? Progressing: []? Met: []? Not Met: []? Adjusted       Overall Progression Towards Functional goals/ Treatment Progress Update:  [] Patient is progressing as expected towards functional goals listed. [] Progression is slowed due to complexities/Impairments listed. [] Progression has been slowed due to co-morbidities. [x] Plan just implemented, too soon to assess goals progression <30days   [] Goals require adjustment due to lack of progress  [] Patient is not progressing as expected and requires additional follow up with physician   [] Other    Prognosis for POC: [x] Good [] Fair  [] Poor      Patient requires continued skilled intervention: [x] Yes  [] No    Treatment/Activity Tolerance:  [x] Patient able to complete treatment  [] Patient limited by fatigue  [] Patient limited by pain     [] Patient limited by other medical complications  [] Other: Pt presented with increased back discomfort, exhibits TTP along paraspinals at T12/L1 area, tolerated IASTM to paraspinals well. Pt cued during sidelying clams to perform exercise more slowly and to pause at top of the range, pt reported an increased challenge and reported more fatigue. Pt continues to be fatigued with core program but was able to tolerate an additional set for each exercise.  Pt requires PT follow up to address ROM, strength and functional mobility deficits. PLAN: See eval  [x] Continue per plan of care [] Alter current plan (see comments above)  [] Plan of care initiated [] Hold pending MD visit [] Discharge      Electronically signed by:  Grace Cordero, PT, DPT, OCS  Physical Therapist  [id number]  [email or web address]      Note: If patient does not return for scheduled/ recommended follow up visits, this note will serve as a discharge from care along with most recent update on progress.

## 2022-04-05 ENCOUNTER — HOSPITAL ENCOUNTER (OUTPATIENT)
Dept: PHYSICAL THERAPY | Age: 57
Setting detail: THERAPIES SERIES
Discharge: HOME | End: 2022-04-05
Payer: COMMERCIAL

## 2022-04-05 PROCEDURE — 97110 THERAPEUTIC EXERCISES: CPT | Performed by: PHYSICAL THERAPIST

## 2022-04-05 PROCEDURE — 97112 NEUROMUSCULAR REEDUCATION: CPT | Performed by: PHYSICAL THERAPIST

## 2022-04-05 NOTE — FLOWSHEET NOTE
[facility] and [address],  Sports Performance and Rehabilitation, [facility] [address]  793 [facility],5Th Floor   Susana León  Phone: [phone]  Fax: [phone]    Physical Therapy Daily Treatment Note  Date:  2022    Patient Name:  Chandrakant Park    :  1965  MRN: [id number]  Restrictions/Precautions:    Medical/Treatment Diagnosis Information:  · Diagnosis: M25.551 (ICD-10-CM) - Right hip pain  · Treatment Diagnosis: M54.50, R53.1, L08.4  Insurance/Certification information:  PT Insurance Information: [id number]  Physician Information:  Referring Practitioner: Obey Alarcon MD  Has the plan of care been signed (Y/N):        []  Yes  [x]  No     Date of Patient follow up with Physician: not scheduled at this time      Is this a Progress Report:     []  Yes  [x]  No        If Yes:  Date Range for reporting period:  Beginning 3/10/22  Ending    Progress report will be due (10 Rx or 30 days whichever is less):  3/53/79      Recertification will be due (POC Duration  / 90 days whichever is less): 22         Visit # Insurance Allowable Auth Required   IE +3 60 []  Yes [x]  No        Functional Scale:    Date assessed:  [facility] 48/100=48% deficit   3/10/22       Latex Allergy:  [x]NO      []YES  Preferred Language for Healthcare:   [x]English       []other:      Pain level:  9/10     SUBJECTIVE: Pt states that her upper back is a little more sore today, but T/Th are her 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 She feels good and is noticing improvement on the other days. Pt rates her pain about a 9/10 today. Also thinks the rain is a factor.     OBJECTIVE:              ROM PROM AROM Comments     Left Right Left Right     Lumbar SB     4 inches from joint line 1 inch from joint line     Trunk rotation     Limited by 90% Limited by 90%     Trunk flex         Finger tips 4 inches from floor   Trunk ext         2 inches                                                Flexibility Left Right Comments   Hamstrings SLR 70 deg SLR 70 deg     ITB (Obers test) Not assessed Not assessed     Hip flexor(Brennen test) Not assessed Not assessed     gastroc Not assessed Not assessed     Rectus femoris(Elys test) Not assessed Not assessed                  Special  Test Left Right Comments   SLR - -     Crossed SLR - -     ANASTACIO + for tightness + for tightness                            Strength Left Right Comments   Hip flexors 3+/5 3+/5     Hip extension 3+/5 3+/5     Hip abduction 3+/5 3+/5     Hip adduction Not assessed Not assessed     Hip ER 4-/5 4-/5     Hip IR 4-/5 4-/5     Quads 4/5 4/5     Hamstrings 4/5 4/5        Joint mobility:               []?Normal                      [x]? Hypo lumbar, thoracic              []?Hyper     Palpation: Not assessed     Functional Mobility/Transfers: Pt independent, increased difficulty with bending, standing, walking and sitting     Posture: WFL     Gait: WFL       RESTRICTIONS/PRECAUTIONS:     Exercises/Interventions:     ROM/STRETCHES     Hooklying trunk rotation    Sidelying open books 2x30\" each side   Figure 4    Knee to chest    Seated cat cow x10    TRUE stretch 2x30\" CBA, each side  3x30\" mid back/trunk flex              PREs     TA brace    Glute sets    ADD sets    clamshells 3x10 each side    Bridges Attempted- LBP              TA Heel slide 3x10 B    TA march 3x10 B    TA OH reach 3x10 playground ball              Leg press  Hold d/t pain at 3/15 visit   Knee ext     Hamstring curl 3x10 20#         TB rows 3x10 red    TB ext 3x10 red              Manual interventions     IASTM  HG 9: brushing     Plan for next session: progress as tolerated    Therapeutic Exercise and NMR EXR  [x] (08416) Provided verbal/tactile cueing for activities related to strengthening, flexibility, endurance, ROM for improvements in LE, proximal hip, and core control with self care, mobility, lifting, Back Stretch - 1 x daily - 4 x weekly - 3 sets - 10 reps  · Standing Shoulder Row with Anchored Resistance - 1 x daily - 4 x weekly - 3 sets - 10 reps  · Shoulder extension with resistance - Neutral - 1 x daily - 4 x weekly - 3 sets - 10 reps    [x] (72670) Reviewed/Progressed HEP activities related to strengthening, flexibility, endurance, ROM of core, proximal hip and LE for functional self-care, mobility, lifting and ambulation/stair navigation   [] (89737)Reviewed/Progressed HEP activities related to improving balance, coordination, kinesthetic sense, posture, motor skill, proprioception of core, proximal hip and LE for self care, mobility, lifting, and ambulation/stair navigation      Manual Treatments:    [] (53869) Provided manual therapy to mobilize LE, proximal hip and/or LS spine soft tissue/joints for the purpose of modulating pain, promoting relaxation,  increasing ROM, reducing/eliminating soft tissue swelling/inflammation/restriction, improving soft tissue extensibility and allowing for proper ROM for normal function with self care, mobility, lifting and ambulation. Modalities:   10' ice    Charges:  Timed Code Treatment Minutes: 38   Total Treatment Minutes: 48   Time in: 5:04  Time out: 5:52    [] EVAL (LOW) 84419 (typically 20 minutes face-to-face)  [] EVAL (MOD) 22340 (typically 30 minutes face-to-face)  [] EVAL (HIGH) 70523 (typically 45 minutes face-to-face)  [] RE-EVAL     [x] JZ(26717) x 2    [] IONTO  [x] NMR (60574) x  1   [] VASO  [] Manual (46964) x       [] Other:  [] TA x      [] Mech Traction (24657)  [] ES(attended) (85276)      [] ES (un) (77488):     GOALS:   Patient stated goal: get back to walking    []? Progressing: []? Met: []? Not Met: []? Adjusted     Therapist goals for Patient:   Short Term Goals: To be achieved in: 2 weeks 3/24/22  1. Independent in HEP and progression per patient tolerance, in order to prevent re-injury. []? Progressing: [x]? Met: []?  Not Met: []? Adjusted   2. Patient will have a decrease in pain to facilitate improvement in movement, function, and ADLs as indicated by Functional Deficits. []? Progressing: [x]? Met: []? Not Met: []? Adjusted     Long Term Goals: To be achieved in: 8 weeks 5/5/22  1. Disability index score of 20% or less for the [place] to assist with reaching prior level of function. []? Progressing: []? Met: []? Not Met: []? Adjusted  2. Patient will demonstrate increased AROM to [facility] to allow for proper joint functioning as indicated by patients Functional Deficits. []? Progressing: []? Met: []? Not Met: []? Adjusted  3. Patient will demonstrate an increase in BLE strength to 4/5 or greater to allow for proper functional mobility as indicated by patients Functional Deficits. []? Progressing: []? Met: []? Not Met: []? Adjusted  4. Patient will return to ADLs, IADLs and functional activities without increased symptoms or restriction. []? Progressing: []? Met: []? Not Met: []? Adjusted  5. Patient will be able to walk >1 mile and sit for > 60 min without back pain for improved completion of work tasks. []? Progressing: []? Met: []? Not Met: []? Adjusted       Overall Progression Towards Functional goals/ Treatment Progress Update:  [] Patient is progressing as expected towards functional goals listed. [] Progression is slowed due to complexities/Impairments listed. [] Progression has been slowed due to co-morbidities.   [x] Plan just implemented, too soon to assess goals progression <30days   [] Goals require adjustment due to lack of progress  [] Patient is not progressing as expected and requires additional follow up with physician   [] Other    Prognosis for POC: [x] Good [] Fair  [] Poor      Patient requires continued skilled intervention: [x] Yes  [] No    Treatment/Activity Tolerance:  [x] Patient able to complete treatment  [] Patient limited by fatigue  [] Patient limited by pain     [] Patient limited by other medical complications  [] Other: Pt presented with increased mid back pain this date. Tolerated CBA and mid trap stretching in TRUE stretch well and felt relief in mid back afterwards. Pt tolerated addition of TB rows and TB ext well, needed initial cue on lats to focus on scap pinch but then performed properly, pt noted slight ache in midback with lat pulldown but did not intensify and was able to tolerate. Pt tolerated increased reps of core exercises. Pt continues to be fatigued with exercise program. Pt continues to note relief with ice. Pt requires PT follow up to address ROM, strength and functional mobility deficits. PLAN: See eval  [x] Continue per plan of care [] Alter current plan (see comments above)  [] Plan of care initiated [] Hold pending MD visit [] Discharge      Electronically signed by:  Mandeep Hernandez PT, DPT, OCS  Physical Therapist  [id number]  [email or web address]      Note: If patient does not return for scheduled/ recommended follow up visits, this note will serve as a discharge from care along with most recent update on progress.

## 2022-04-14 ENCOUNTER — HOSPITAL ENCOUNTER (OUTPATIENT)
Dept: PHYSICAL THERAPY | Age: 57
Setting detail: THERAPIES SERIES
Discharge: HOME | End: 2022-04-14
Payer: COMMERCIAL

## 2022-04-14 PROCEDURE — 97110 THERAPEUTIC EXERCISES: CPT | Performed by: PHYSICAL THERAPIST

## 2022-04-14 PROCEDURE — 97112 NEUROMUSCULAR REEDUCATION: CPT | Performed by: PHYSICAL THERAPIST

## 2022-04-14 NOTE — PROGRESS NOTES
[facility] and [address],  Sports Performance and Rehabilitation, [facility] 6199 [address]  793 [facility],5Th Floor   Susana León  Phone: [phone]  Fax: [phone]    Physical Therapy Daily Treatment Note  Date:  2022    Patient Name:  Chandrakant Park    :  1965  MRN: [id number]  Restrictions/Precautions:    Medical/Treatment Diagnosis Information:  · Diagnosis: M25.551 (ICD-10-CM) - Right hip pain  · Treatment Diagnosis: M54.50, R53.1, T14.0  Insurance/Certification information:  PT Insurance Information: [id number]  Physician Information:  Referring Practitioner: Obey Alarcon MD  Has the plan of care been signed (Y/N):        []  Yes  [x]  No     Date of Patient follow up with Physician: not scheduled at this time      Is this a Progress Report:     []  Yes  [x]  No        If Yes:  Date Range for reporting period:  Beginning 3/10/22  Ending 22    Progress report will be due (10 Rx or 30 days whichever is less):  56      Recertification will be due (POC Duration  / 90 days whichever is less): 22         Visit # Insurance Allowable Auth Required   IE +4 60 []  Yes [x]  No        Functional Scale:    Date assessed:  [facility] 48/100=48% deficit   3/10/22  [facility] 23/100=23% deficit   22       Latex Allergy:  [x]NO      []YES  Preferred Language for Healthcare:   [x]English       []other:      Pain level:  6/10     SUBJECTIVE:  Pt states that she is feeling pretty good. Pain is \"not too bad\" today. Pain is still more in her mid-back than her low back.      OBJECTIVE:              ROM PROM AROM Comments     Left Right Left Right     Lumbar SB     4 inches from joint line 1 inch from joint line     Trunk rotation     Limited by 90% Limited by 90%     Trunk flex         Finger tips 4 inches from floor   Trunk ext         2 inches                                                Flexibility Left Right Comments   Hamstrings SLR 70 deg SLR 70 deg     ITB (Obers test) Not assessed Not assessed     Hip flexor(Brennen test) Not assessed Not assessed     gastroc Not assessed Not assessed     Rectus femoris(Elys test) Not assessed Not assessed                  Special  Test Left Right Comments   SLR - -     Crossed SLR - -     ANASTACIO + for tightness + for tightness                            Strength Left Right Comments   Hip flexors 3+/5 3+/5     Hip extension 3+/5 3+/5     Hip abduction 3+/5 3+/5     Hip adduction Not assessed Not assessed     Hip ER 4-/5 4-/5     Hip IR 4-/5 4-/5     Quads 4/5 4/5     Hamstrings 4/5 4/5        Joint mobility:               []?Normal                      [x]? Hypo lumbar, thoracic              []?Hyper     Palpation: Not assessed     Functional Mobility/Transfers: Pt independent, increased difficulty with bending, standing, walking and sitting     Posture: WFL     Gait: WFL       RESTRICTIONS/PRECAUTIONS:     Exercises/Interventions:     ROM/STRETCHES     Hooklying trunk rotation    Sidelying open books x10-15 each side   Figure 4    Knee to chest    Seated cat cow x10    TRUE stretch 2x30\" CBA, each side  3x30\" mid back/trunk flex              PREs     TA brace    Glute sets    ADD sets    clamshells 3x10 each side, red tied band    Bridges Attempted- LBP              TA Heel slide     TA march 3x10 B Holding 2# MB at 90 deg shoulder flex   TA OH reach 3x10 2#MB              Leg press  Hold d/t pain at 3/15 visit   Knee ext     Hamstring curl 3x10 25#         TB rows 3x10 green    TB ext 3x10 red              Manual interventions     IASTM  HG 9: brushing     Plan for next session: progress as tolerated    Therapeutic Exercise and NMR EXR  [x] (68839) Provided verbal/tactile cueing for activities related to strengthening, flexibility, endurance, ROM for improvements in LE, proximal hip, and core control with self care, mobility, lifting, ambulation.  [] (85290) Provided verbal/tactile cueing for activities related to improving balance, coordination, kinesthetic sense, posture, motor skill, proprioception  to assist with LE, proximal hip, and core control in self care, mobility, lifting, ambulation and eccentric single leg control. NMR and Therapeutic Activities:    [] (60521 or 02545) Provided verbal/tactile cueing for activities related to improving balance, coordination, kinesthetic sense, posture, motor skill, proprioception and motor activation to allow for proper function of core, proximal hip and LE with self care and ADLs  [] (87162) Gait Re-education- Provided training and instruction to the patient for proper LE, core and proximal hip recruitment and positioning and eccentric body weight control with ambulation re-education including up and down stairs     Home Exercise Program:    Access Code: [email or web address]  URL: [email or web address]  Updated: 04/05/2022  Prepared by: Saira Gutierrez      Exercises: alternate days between low back and mid back program  · Supine Lower Trunk Rotation - 1 x daily - 4 x weekly - 1 sets - 3 reps - 30 second hold  · Supine Figure 4 Piriformis Stretch - 1 x daily - 4 x weekly - 1 sets - 3 reps - 30 second hold  · Clamshell - 1 x daily - 4 x weekly - 3 sets - 10 reps  · Supine Hip Adduction Isometric with Ball - 1 x daily - 4 x weekly - 1 sets - 10 reps - 10 second hold  · Supine Gluteal Sets - 1 x daily - 4 x weekly - 1 sets - 10 reps - 10 second hold  · Supine Shoulder Flexion Extension AAROM with Dowel - 1 x daily - 4 x weekly - 2 sets - 5 reps  · Supine Transversus Abdominis Bracing with Heel Slide - 1 x daily - 4 x weekly - 2 sets - 5 reps  · Supine March - 1 x daily - 4 x weekly - 2 sets - 5 reps  · Sidelying Thoracic Rotation with Open Book - 1 x daily - 4 x weekly - 1 sets - 3 reps - 30 second hold  · Standing Shoulder Posterior Capsule Stretch - 1 x daily - 4 x weekly - 3 sets - 10 reps  · Seated Mid Back Stretch - 1 x daily - 4 x weekly - 3 sets - 10 reps  · Standing Shoulder Row with Anchored Resistance - 1 x daily - 4 x weekly - 3 sets - 10 reps  · Shoulder extension with resistance - Neutral - 1 x daily - 4 x weekly - 3 sets - 10 reps    [x] (49345) Reviewed/Progressed HEP activities related to strengthening, flexibility, endurance, ROM of core, proximal hip and LE for functional self-care, mobility, lifting and ambulation/stair navigation   [] (11015)Reviewed/Progressed HEP activities related to improving balance, coordination, kinesthetic sense, posture, motor skill, proprioception of core, proximal hip and LE for self care, mobility, lifting, and ambulation/stair navigation      Manual Treatments:    [] (20140) Provided manual therapy to mobilize LE, proximal hip and/or LS spine soft tissue/joints for the purpose of modulating pain, promoting relaxation,  increasing ROM, reducing/eliminating soft tissue swelling/inflammation/restriction, improving soft tissue extensibility and allowing for proper ROM for normal function with self care, mobility, lifting and ambulation. Modalities:   15' ice    Charges:  Timed Code Treatment Minutes: 38   Total Treatment Minutes: 48   Time in: 5:50  Time out: 6:43    [] EVAL (LOW) 58406 (typically 20 minutes face-to-face)  [] EVAL (MOD) 84617 (typically 30 minutes face-to-face)  [] EVAL (HIGH) 04944 (typically 45 minutes face-to-face)  [] RE-EVAL     [x] FK(03461) x 2    [] IONTO  [x] NMR (40708) x  1   [] VASO  [] Manual (24599) x       [] Other:  [] TA x      [] Mech Traction (83228)  [] ES(attended) (11435)      [] ES (un) (63688):     GOALS:   Patient stated goal: get back to walking    []? Progressing: []? Met: []? Not Met: []? Adjusted     Therapist goals for Patient:   Short Term Goals: To be achieved in: 2 weeks 3/24/22  1. Independent in HEP and progression per patient tolerance, in order to prevent re-injury. []? Progressing: [x]? Met: []? Not Met: []? Adjusted   2.  Patient will have a decrease in pain to facilitate improvement in movement, function, and ADLs as indicated by Functional Deficits. []? Progressing: [x]? Met: []? Not Met: []? Adjusted     Long Term Goals: To be achieved in: 8 weeks 5/5/22  1. Disability index score of 20% or less for the [place] to assist with reaching prior level of function. []? Progressing: []? Met: []? Not Met: []? Adjusted  2. Patient will demonstrate increased AROM to [facility] to allow for proper joint functioning as indicated by patients Functional Deficits. []? Progressing: []? Met: []? Not Met: []? Adjusted  3. Patient will demonstrate an increase in BLE strength to 4/5 or greater to allow for proper functional mobility as indicated by patients Functional Deficits. []? Progressing: []? Met: []? Not Met: []? Adjusted  4. Patient will return to ADLs, IADLs and functional activities without increased symptoms or restriction. []? Progressing: []? Met: []? Not Met: []? Adjusted  5. Patient will be able to walk >1 mile and sit for > 60 min without back pain for improved completion of work tasks. []? Progressing: []? Met: []? Not Met: []? Adjusted       Overall Progression Towards Functional goals/ Treatment Progress Update:  [] Patient is progressing as expected towards functional goals listed. [] Progression is slowed due to complexities/Impairments listed. [] Progression has been slowed due to co-morbidities.   [x] Plan just implemented, too soon to assess goals progression <30days   [] Goals require adjustment due to lack of progress  [] Patient is not progressing as expected and requires additional follow up with physician   [] Other    Prognosis for POC: [x] Good [] Fair  [] Poor      Patient requires continued skilled intervention: [x] Yes  [] No    Treatment/Activity Tolerance:  [x] Patient able to complete treatment  [] Patient limited by fatigue  [] Patient limited by pain     [] Patient limited by other medical complications  [] Other: Pt exhibits 25% improvement in [place] score since IE, she now has greater ease completing ADLs, IADLs and work tasks. Pt tolerated progression of exercise program well. She reported increased fatigue with resistance added to clams and weight ball to core. With TB on clams but was not able to move through as large of ROM d/t weakness. Pt noted slight achiness in back during CBA stretching. Pt continues to note relief with ice. Pt requires PT follow up to address ROM, strength and functional mobility deficits. PLAN: See eval  [x] Continue per plan of care [] Alter current plan (see comments above)  [] Plan of care initiated [] Hold pending MD visit [] Discharge      Electronically signed by:  Bernadette Laureano, PT, DPT, OCS  Physical Therapist  [id number]  [email or web address]      Note: If patient does not return for scheduled/ recommended follow up visits, this note will serve as a discharge from care along with most recent update on progress.

## 2022-04-19 ENCOUNTER — HOSPITAL ENCOUNTER (OUTPATIENT)
Dept: PHYSICAL THERAPY | Age: 57
Setting detail: THERAPIES SERIES
Discharge: HOME | End: 2022-04-19
Payer: COMMERCIAL

## 2022-04-19 PROCEDURE — 97110 THERAPEUTIC EXERCISES: CPT | Performed by: PHYSICAL THERAPIST

## 2022-04-19 PROCEDURE — 97112 NEUROMUSCULAR REEDUCATION: CPT | Performed by: PHYSICAL THERAPIST

## 2022-04-19 NOTE — FLOWSHEET NOTE
The [facility] and [address],  Sports Performance and Rehabilitation, [facility] 6199 12[address]  [address] [facility],5[address]   [place] León  Phone: [phone]  Fax: [phone]    Physical Therapy Daily Treatment Note  Date:  2022    Patient Name:  Linsey Hamilton    :  1965  MRN: [id number]  Restrictions/Precautions:    Medical/Treatment Diagnosis Information:  · Diagnosis: M25.551 (ICD-10-CM) - Right hip pain  · Treatment Diagnosis: M54.50, R53.1, Q43.6  Insurance/Certification information:  PT Insurance Information: [id number]  Physician Information:  Referring Practitioner: Yasmin Rocha MD  Has the plan of care been signed (Y/N):        []  Yes  [x]  No     Date of Patient follow up with Physician: not scheduled at this time      Is this a Progress Report:     []  Yes  [x]  No        If Yes:  Date Range for reporting period:  Beginning 3/10/22  Ending 22    Progress report will be due (10 Rx or 30 days whichever is less):        Recertification will be due (POC Duration  / 90 days whichever is less): 22         Visit # Insurance Allowable Auth Required   IE +4 60 []  Yes [x]  No        Functional Scale:    Date assessed:  [facility] 48/100=48% deficit   3/10/22  [facility] 23/100=23% deficit   22       Latex Allergy:  [x]NO      []YES  Preferred Language for Healthcare:   [x]English       []other:      Pain level:  6/10     SUBJECTIVE:  Pt states that she was doing great, but her low back on the R side is bothersome today and unsure why.     OBJECTIVE:              ROM PROM AROM Comments     Left Right Left Right     Lumbar SB     4 inches from joint line 1 inch from joint line     Trunk rotation     Limited by 90% Limited by 90%     Trunk flex         Finger tips 4 inches from floor   Trunk ext         2 inches                                                Flexibility Left Right Comments   Hamstrings SLR 70 deg SLR 70 deg     ITB Gael Blakes test) Not assessed Not assessed     Hip flexor(Brennen test) Not assessed Not assessed     gastroc Not assessed Not assessed     Rectus femoris(Elys test) Not assessed Not assessed                  Special  Test Left Right Comments   SLR - -     Crossed SLR - -     ANASTACIO + for tightness + for tightness                            Strength Left Right Comments   Hip flexors 3+/5 3+/5     Hip extension 3+/5 3+/5     Hip abduction 3+/5 3+/5     Hip adduction Not assessed Not assessed     Hip ER 4-/5 4-/5     Hip IR 4-/5 4-/5     Quads 4/5 4/5     Hamstrings 4/5 4/5        Joint mobility:               []?Normal                      [x]? Hypo lumbar, thoracic              []?Hyper     Palpation: Not assessed     Functional Mobility/Transfers: Pt independent, increased difficulty with bending, standing, walking and sitting     Posture: WFL     Gait: WFL       RESTRICTIONS/PRECAUTIONS:     Exercises/Interventions:     ROM/STRETCHES     Hooklying trunk rotation 2x30\" each side   Sidelying open books x10-15 each side   Figure 4 2x30\" each side    Knee to chest 2x30\" each side    Seated cat cow x10    TRUE stretch               PREs     TA brace    Glute sets    ADD sets    clamshells 3x10 each side, red tied band    Bridges Attempted- LBP              TA Heel slide     TA march 3x10 B Holding 2# MB at 90 deg shoulder flex   TA OH reach 3x10 2#MB              Leg press  Hold d/t pain at 3/15 visit   Knee ext     Hamstring curl 3x10 25#         TB rows 3x10 blue    TB ext 3x10 green              Manual interventions     IASTM  HG 9: brushing     Plan for next session: progress as tolerated    Therapeutic Exercise and NMR EXR  [x] (49755) Provided verbal/tactile cueing for activities related to strengthening, flexibility, endurance, ROM for improvements in LE, proximal hip, and core control with self care, mobility, lifting, ambulation.  [] (90156) Provided verbal/tactile cueing for activities related to improving balance, coordination, kinesthetic sense, posture, motor skill, proprioception  to assist with LE, proximal hip, and core control in self care, mobility, lifting, ambulation and eccentric single leg control. NMR and Therapeutic Activities:    [] (35900 or 44092) Provided verbal/tactile cueing for activities related to improving balance, coordination, kinesthetic sense, posture, motor skill, proprioception and motor activation to allow for proper function of core, proximal hip and LE with self care and ADLs  [] (59381) Gait Re-education- Provided training and instruction to the patient for proper LE, core and proximal hip recruitment and positioning and eccentric body weight control with ambulation re-education including up and down stairs     Home Exercise Program:    Access Code: [email or web address]  URL: [email or web address]  Updated: 04/05/2022  Prepared by: Pippa Sanchez      Exercises: alternate days between low back and mid back program  · Supine Lower Trunk Rotation - 1 x daily - 4 x weekly - 1 sets - 3 reps - 30 second hold  · Supine Figure 4 Piriformis Stretch - 1 x daily - 4 x weekly - 1 sets - 3 reps - 30 second hold  · Clamshell - 1 x daily - 4 x weekly - 3 sets - 10 reps  · Supine Hip Adduction Isometric with Ball - 1 x daily - 4 x weekly - 1 sets - 10 reps - 10 second hold  · Supine Gluteal Sets - 1 x daily - 4 x weekly - 1 sets - 10 reps - 10 second hold  · Supine Shoulder Flexion Extension AAROM with Dowel - 1 x daily - 4 x weekly - 2 sets - 5 reps  · Supine Transversus Abdominis Bracing with Heel Slide - 1 x daily - 4 x weekly - 2 sets - 5 reps  · Supine March - 1 x daily - 4 x weekly - 2 sets - 5 reps  · Sidelying Thoracic Rotation with Open Book - 1 x daily - 4 x weekly - 1 sets - 3 reps - 30 second hold  · Standing Shoulder Posterior Capsule Stretch - 1 x daily - 4 x weekly - 3 sets - 10 reps  · Seated Mid Back Stretch - 1 x daily - 4 x weekly - 3 sets - 10 reps  · Standing Shoulder Row with Anchored Resistance - 1 x daily - 4 x weekly - 3 sets - 10 reps  · Shoulder extension with resistance - Neutral - 1 x daily - 4 x weekly - 3 sets - 10 reps    [x] (21584) Reviewed/Progressed HEP activities related to strengthening, flexibility, endurance, ROM of core, proximal hip and LE for functional self-care, mobility, lifting and ambulation/stair navigation   [] (45908)Reviewed/Progressed HEP activities related to improving balance, coordination, kinesthetic sense, posture, motor skill, proprioception of core, proximal hip and LE for self care, mobility, lifting, and ambulation/stair navigation      Manual Treatments:    [] (16460) Provided manual therapy to mobilize LE, proximal hip and/or LS spine soft tissue/joints for the purpose of modulating pain, promoting relaxation,  increasing ROM, reducing/eliminating soft tissue swelling/inflammation/restriction, improving soft tissue extensibility and allowing for proper ROM for normal function with self care, mobility, lifting and ambulation. Modalities:   10' ice    Charges:  Timed Code Treatment Minutes: 40   Total Treatment Minutes: 50   Time in: 5:50  Time out: 6:43    [] EVAL (LOW) 56101 (typically 20 minutes face-to-face)  [] EVAL (MOD) 59576 (typically 30 minutes face-to-face)  [] EVAL (HIGH) 68909 (typically 45 minutes face-to-face)  [] RE-EVAL     [x] HB(69317) x 2    [] IONTO  [x] NMR (47665) x  1   [] VASO  [] Manual (60365) x       [] Other:  [] TA x      [] Mech Traction (52131)  [] ES(attended) (97759)      [] ES (un) (82287):     GOALS:   Patient stated goal: get back to walking    []? Progressing: []? Met: []? Not Met: []? Adjusted     Therapist goals for Patient:   Short Term Goals: To be achieved in: 2 weeks 3/24/22  1. Independent in HEP and progression per patient tolerance, in order to prevent re-injury. []? Progressing: [x]? Met: []? Not Met: []? Adjusted   2.  Patient will have a decrease in pain to facilitate improvement in movement, function, and ADLs as indicated by Functional Deficits. []? Progressing: [x]? Met: []? Not Met: []? Adjusted     Long Term Goals: To be achieved in: 8 weeks 5/5/22  1. Disability index score of 20% or less for the [place] to assist with reaching prior level of function. []? Progressing: []? Met: []? Not Met: []? Adjusted  2. Patient will demonstrate increased AROM to [facility] to allow for proper joint functioning as indicated by patients Functional Deficits. []? Progressing: []? Met: []? Not Met: []? Adjusted  3. Patient will demonstrate an increase in BLE strength to 4/5 or greater to allow for proper functional mobility as indicated by patients Functional Deficits. []? Progressing: []? Met: []? Not Met: []? Adjusted  4. Patient will return to ADLs, IADLs and functional activities without increased symptoms or restriction. []? Progressing: []? Met: []? Not Met: []? Adjusted  5. Patient will be able to walk >1 mile and sit for > 60 min without back pain for improved completion of work tasks. []? Progressing: []? Met: []? Not Met: []? Adjusted       Overall Progression Towards Functional goals/ Treatment Progress Update:  [] Patient is progressing as expected towards functional goals listed. [] Progression is slowed due to complexities/Impairments listed. [] Progression has been slowed due to co-morbidities.   [x] Plan just implemented, too soon to assess goals progression <30days   [] Goals require adjustment due to lack of progress  [] Patient is not progressing as expected and requires additional follow up with physician   [] Other    Prognosis for POC: [x] Good [] Fair  [] Poor      Patient requires continued skilled intervention: [x] Yes  [] No    Treatment/Activity Tolerance:  [x] Patient able to complete treatment  [] Patient limited by fatigue  [] Patient limited by pain     [] Patient limited by other medical complications  [] Other: Pt tolerated session well, able to complete full program despite arriving to PT with increased low back pain. Pt continues to be fatigued with exercise program. Pt able to tolerate increased resistance for TB ext and rows, did require initial VC on ext to avoid slight UT shrug before pinching shoulder blades. Pt reported feeling better at end of session. Some discomfort did remain which was diminished with ice. Pt requires PT follow up to address ROM, strength and functional mobility deficits. PLAN: See eval  [x] Continue per plan of care [] Alter current plan (see comments above)  [] Plan of care initiated [] Hold pending MD visit [] Discharge      Electronically signed by:  Marshall Tavarez, PT, DPT, OCS  Physical Therapist  [id number]  [email or web address]      Note: If patient does not return for scheduled/ recommended follow up visits, this note will serve as a discharge from care along with most recent update on progress.

## 2022-04-26 ENCOUNTER — HOSPITAL ENCOUNTER (OUTPATIENT)
Dept: PHYSICAL THERAPY | Age: 57
Setting detail: THERAPIES SERIES
Discharge: HOME | End: 2022-04-26
Payer: COMMERCIAL

## 2022-04-26 PROCEDURE — 97110 THERAPEUTIC EXERCISES: CPT | Performed by: PHYSICAL THERAPIST

## 2022-04-26 PROCEDURE — 97140 MANUAL THERAPY 1/> REGIONS: CPT | Performed by: PHYSICAL THERAPIST

## 2022-04-26 NOTE — FLOWSHEET NOTE
The [facility] and [address],  Sports Performance and Rehabilitation, [facility] 6199 1246 [address]  [address] [facility],5[address]   [place] León  Phone: [phone]  Fax: [phone]    Physical Therapy Daily Treatment Note  Date:  2022    Patient Name:  Kofi Montenegro    :  1965  MRN: [id number]  Restrictions/Precautions:    Medical/Treatment Diagnosis Information:  · Diagnosis: M25.551 (ICD-10-CM) - Right hip pain  · Treatment Diagnosis: M54.50, R53.1, L97.1  Insurance/Certification information:  PT Insurance Information: [id number] [facility]  Physician Information:  Referring Practitioner: Joaquín Yi MD  Has the plan of care been signed (Y/N):        []  Yes  [x]  No     Date of Patient follow up with Physician: not scheduled at this time      Is this a Progress Report:     []  Yes  [x]  No        If Yes:  Date Range for reporting period:  Beginning 3/10/22  Ending 22    Progress report will be due (10 Rx or 30 days whichever is less):  08      Recertification will be due (POC Duration  / 90 days whichever is less): 22         Visit # Insurance Allowable Auth Required   IE +5 60 []  Yes [x]  No        Functional Scale:    Date assessed:  [facility] 48/100=48% deficit   3/10/22  [facility] 23/100=23% deficit   22       Latex Allergy:  [x]NO      []YES  Preferred Language for Healthcare:   [x]English       []other:      Pain level:  6/10     SUBJECTIVE: Pt picked up a little girl yesterday and her back has been killing her ever since. The pain is down the middle of the low back to the base of the pelvis. She has been doing stretching and icing.      OBJECTIVE:              ROM PROM AROM Comments     Left Right Left Right     Lumbar SB     4 inches from joint line 1 inch from joint line     Trunk rotation     Limited by 90% Limited by 90%     Trunk flex         Finger tips 4 inches from floor   Trunk ext         2 inches                                                Flexibility Left Right Comments   Hamstrings SLR 70 deg SLR 70 deg     ITB (Obers test) Not assessed Not assessed     Hip flexor(Brennen test) Not assessed Not assessed     gastroc Not assessed Not assessed     Rectus femoris(Elys test) Not assessed Not assessed                  Special  Test Left Right Comments   SLR - -     Crossed SLR - -     ANASTACIO + for tightness + for tightness                            Strength Left Right Comments   Hip flexors 3+/5 3+/5     Hip extension 3+/5 3+/5     Hip abduction 3+/5 3+/5     Hip adduction Not assessed Not assessed     Hip ER 4-/5 4-/5     Hip IR 4-/5 4-/5     Quads 4/5 4/5     Hamstrings 4/5 4/5        Joint mobility:               []?Normal                      [x]? Hypo lumbar, thoracic              []?Hyper     Palpation: Not assessed     Functional Mobility/Transfers: Pt independent, increased difficulty with bending, standing, walking and sitting     Posture: WFL     Gait: WFL       RESTRICTIONS/PRECAUTIONS:     Exercises/Interventions:     ROM/STRETCHES     Hooklying trunk rotation 2x30\" each side   Sidelying open books x10-15 each side   Figure 4 2x30\" each side    Knee to chest 2x30\" each side    Seated cat cow x10    TRUE stretch               PREs     TA brace    Glute sets    ADD sets    clamshells 3x10 each side, red tied band    Bridges Attempted- LBP              TA Heel slide     TA march 3x10 B Holding 2# MB at 90 deg shoulder flex   TA OH reach 3x10 2#MB              Leg press  Hold d/t pain at 3/15 visit   Knee ext     Hamstring curl 3x10 25#         TB rows    TB ext              Manual interventions     IASTM 8' T10-L3 paraspinals HG 9: brushing     Plan for next session: progress as tolerated    Therapeutic Exercise and NMR EXR  [x] (53444) Provided verbal/tactile cueing for activities related to strengthening, flexibility, endurance, ROM for improvements in LE, proximal hip, and core control with self care, mobility, lifting, ambulation.  [] (69840) Provided verbal/tactile cueing for activities related to improving balance, coordination, kinesthetic sense, posture, motor skill, proprioception  to assist with LE, proximal hip, and core control in self care, mobility, lifting, ambulation and eccentric single leg control. NMR and Therapeutic Activities:    [] (46640 or 98333) Provided verbal/tactile cueing for activities related to improving balance, coordination, kinesthetic sense, posture, motor skill, proprioception and motor activation to allow for proper function of core, proximal hip and LE with self care and ADLs  [] (24735) Gait Re-education- Provided training and instruction to the patient for proper LE, core and proximal hip recruitment and positioning and eccentric body weight control with ambulation re-education including up and down stairs     Home Exercise Program:    Access Code: [email or web address]  URL: [email or web address]  Updated: 04/05/2022  Prepared by: Rosendo Menjivar      Exercises: alternate days between low back and mid back program  · Supine Lower Trunk Rotation - 1 x daily - 4 x weekly - 1 sets - 3 reps - 30 second hold  · Supine Figure 4 Piriformis Stretch - 1 x daily - 4 x weekly - 1 sets - 3 reps - 30 second hold  · Clamshell - 1 x daily - 4 x weekly - 3 sets - 10 reps  · Supine Hip Adduction Isometric with Ball - 1 x daily - 4 x weekly - 1 sets - 10 reps - 10 second hold  · Supine Gluteal Sets - 1 x daily - 4 x weekly - 1 sets - 10 reps - 10 second hold  · Supine Shoulder Flexion Extension AAROM with Dowel - 1 x daily - 4 x weekly - 2 sets - 5 reps  · Supine Transversus Abdominis Bracing with Heel Slide - 1 x daily - 4 x weekly - 2 sets - 5 reps  · Supine March - 1 x daily - 4 x weekly - 2 sets - 5 reps  · Sidelying Thoracic Rotation with Open Book - 1 x daily - 4 x weekly - 1 sets - 3 reps - 30 second hold  · Standing Shoulder Posterior Capsule Stretch - 1 x daily - 4 x weekly - 3 sets - 10 reps  · Seated Mid Back Stretch - 1 x daily - 4 x weekly - 3 sets - 10 reps  · Standing Shoulder Row with Anchored Resistance - 1 x daily - 4 x weekly - 3 sets - 10 reps  · Shoulder extension with resistance - Neutral - 1 x daily - 4 x weekly - 3 sets - 10 reps    [x] (20260) Reviewed/Progressed HEP activities related to strengthening, flexibility, endurance, ROM of core, proximal hip and LE for functional self-care, mobility, lifting and ambulation/stair navigation   [] (34112)Reviewed/Progressed HEP activities related to improving balance, coordination, kinesthetic sense, posture, motor skill, proprioception of core, proximal hip and LE for self care, mobility, lifting, and ambulation/stair navigation      Manual Treatments:    [] (94443) Provided manual therapy to mobilize LE, proximal hip and/or LS spine soft tissue/joints for the purpose of modulating pain, promoting relaxation,  increasing ROM, reducing/eliminating soft tissue swelling/inflammation/restriction, improving soft tissue extensibility and allowing for proper ROM for normal function with self care, mobility, lifting and ambulation. Modalities:   10' ice    Charges:  Timed Code Treatment Minutes: 29   Total Treatment Minutes: 44   Time in: 5:47  Time out: 6:31    [] EVAL (LOW) 52626 (typically 20 minutes face-to-face)  [] EVAL (MOD) 96302 (typically 30 minutes face-to-face)  [] EVAL (HIGH) 73374 (typically 45 minutes face-to-face)  [] RE-EVAL     [x] EL(45311) x 1    [] IONTO  [] NMR (61344) x     [] VASO  [x] Manual (55401) x 1      [] Other:  [] TA x      [] Mech Traction (48549)  [] ES(attended) (77556)      [] ES (un) (46543):     GOALS:   Patient stated goal: get back to walking    []? Progressing: []? Met: []? Not Met: []? Adjusted     Therapist goals for Patient:   Short Term Goals: To be achieved in: 2 weeks 3/24/22  1. Independent in HEP and progression per patient tolerance, in order to prevent re-injury. []? Progressing: [x]? Met: []?  Not Met: []? Adjusted   2. Patient will have a decrease in pain to facilitate improvement in movement, function, and ADLs as indicated by Functional Deficits. []? Progressing: [x]? Met: []? Not Met: []? Adjusted     Long Term Goals: To be achieved in: 8 weeks 5/5/22  1. Disability index score of 20% or less for the [place] to assist with reaching prior level of function. []? Progressing: []? Met: []? Not Met: []? Adjusted  2. Patient will demonstrate increased AROM to [facility] to allow for proper joint functioning as indicated by patients Functional Deficits. []? Progressing: []? Met: []? Not Met: []? Adjusted  3. Patient will demonstrate an increase in BLE strength to 4/5 or greater to allow for proper functional mobility as indicated by patients Functional Deficits. []? Progressing: []? Met: []? Not Met: []? Adjusted  4. Patient will return to ADLs, IADLs and functional activities without increased symptoms or restriction. []? Progressing: []? Met: []? Not Met: []? Adjusted  5. Patient will be able to walk >1 mile and sit for > 60 min without back pain for improved completion of work tasks. []? Progressing: []? Met: []? Not Met: []? Adjusted       Overall Progression Towards Functional goals/ Treatment Progress Update:  [] Patient is progressing as expected towards functional goals listed. [] Progression is slowed due to complexities/Impairments listed. [] Progression has been slowed due to co-morbidities.   [x] Plan just implemented, too soon to assess goals progression <30days   [] Goals require adjustment due to lack of progress  [] Patient is not progressing as expected and requires additional follow up with physician   [] Other    Prognosis for POC: [x] Good [] Fair  [] Poor      Patient requires continued skilled intervention: [x] Yes  [] No    Treatment/Activity Tolerance:  [x] Patient able to complete treatment  [] Patient limited by fatigue  [] Patient limited by pain     [] Patient limited by other medical complications  [] Other: Held TB row and ext d/t pt's pain and reports of increase in back \"throbbing\" after completion of stretching and core work. Pt did report that clamshells were more uncomfortable on the back today. Pt exhibits TTP along lumbar spine, tolerated IASTM well and noted that it felt good. Discussed continuing ice for 48 hour after injury but then can use heat of feelings of tightness/throbbing persist. Pt requires PT follow up to address ROM, strength and functional mobility deficits. PLAN: See eval  [x] Continue per plan of care [] Alter current plan (see comments above)  [] Plan of care initiated [] Hold pending MD visit [] Discharge      Electronically signed by:  Kristy Faulkner, PT, DPT, OCS  Physical Therapist  [id number]  [email or web address]      Note: If patient does not return for scheduled/ recommended follow up visits, this note will serve as a discharge from care along with most recent update on progress.

## 2022-05-03 ENCOUNTER — HOSPITAL ENCOUNTER (OUTPATIENT)
Dept: PHYSICAL THERAPY | Age: 57
Setting detail: THERAPIES SERIES
Discharge: HOME | End: 2022-05-03
Payer: COMMERCIAL

## 2022-05-03 PROCEDURE — 97530 THERAPEUTIC ACTIVITIES: CPT | Performed by: PHYSICAL THERAPIST

## 2022-05-03 PROCEDURE — 97110 THERAPEUTIC EXERCISES: CPT | Performed by: PHYSICAL THERAPIST

## 2022-05-03 NOTE — FLOWSHEET NOTE
[facility] and [address],  Sports Performance and Rehabilitation, [facility] [address]  793 [facility],5Th Floor   [place], [place]  Phone: [phone]  Fax: [phone]    Physical Therapy Daily Treatment Note  Date:  5/3/2022    Patient Name:  Dede Clark    :  1965  MRN: [id number]  Restrictions/Precautions:    Medical/Treatment Diagnosis Information:  · Diagnosis: M25.551 (ICD-10-CM) - Right hip pain  · Treatment Diagnosis: M54.50, R53.1, F80.2  Insurance/Certification information:  PT Insurance Information: [facility]  Physician Information:  Referring Practitioner: Manoj Medina MD  Has the plan of care been signed (Y/N):        []  Yes  [x]  No     Date of Patient follow up with Physician: not scheduled at this time      Is this a Progress Report:     []  Yes  [x]  No        If Yes:  Date Range for reporting period:  Beginning 3/10/22  Ending 22    Progress report will be due (10 Rx or 30 days whichever is less):  96      Recertification will be due (POC Duration  / 90 days whichever is less): 22         Visit # Insurance Allowable Auth Required   IE +6 60 []  Yes [x]  No        Functional Scale:    Date assessed:  [facility] 48/100=48% deficit   3/10/22  [facility] 23/100=23% deficit   22       Latex Allergy:  [x]NO      []YES  Preferred Language for Healthcare:   [x]English       []other:      Pain level:  6/10     SUBJECTIVE: Pt states that she is feeling much better than last week. Pt states that her lower back is a little sore today.     OBJECTIVE:              ROM PROM AROM Comments     Left Right Left Right     Lumbar SB     4 inches from joint line 1 inch from joint line     Trunk rotation     Limited by 90% Limited by 90%     Trunk flex         Finger tips 4 inches from floor   Trunk ext         2 inches                                                Flexibility Left Right Comments   Hamstrings SLR 70 deg SLR 70 deg   ITB (Obers test) Not assessed Not assessed     Hip flexor(Brennen test) Not assessed Not assessed     gastroc Not assessed Not assessed     Rectus femoris(Elys test) Not assessed Not assessed                  Special  Test Left Right Comments   SLR - -     Crossed SLR - -     ANASTACIO + for tightness + for tightness                            Strength Left Right Comments   Hip flexors 3+/5 3+/5     Hip extension 3+/5 3+/5     Hip abduction 3+/5 3+/5     Hip adduction Not assessed Not assessed     Hip ER 4-/5 4-/5     Hip IR 4-/5 4-/5     Quads 4/5 4/5     Hamstrings 4/5 4/5        Joint mobility:               []?Normal                      [x]? Hypo lumbar, thoracic              []?Hyper     Palpation: Not assessed     Functional Mobility/Transfers: Pt independent, increased difficulty with bending, standing, walking and sitting     Posture: WFL     Gait: WFL       RESTRICTIONS/PRECAUTIONS:     Exercises/Interventions:     ROM/STRETCHES     Hooklying trunk rotation 2x30\" each side   Sidelying open books x10-15 each side   Figure 4 2x30\" each side    Knee to chest 2x30\" each side    Seated cat cow x10    TRUE stretch               PREs     TA brace    Glute sets    ADD sets    clamshells 3x10 each side, red tied band    Bridges Attempted- LBP              TA Heel slide     TA march 3x15 B Holding 2# MB at 90 deg shoulder flex   TA OH reach 3x15 2#MB              Leg press  Hold d/t pain at 3/15 visit   Knee ext     Hamstring curl 3x10 25#         TB rows 3x12 blue    TB ext 3x10 green              Manual interventions     IASTM  HG 9: brushing     Plan for next session: progress as tolerated    Therapeutic Exercise and NMR EXR  [x] (69890) Provided verbal/tactile cueing for activities related to strengthening, flexibility, endurance, ROM for improvements in LE, proximal hip, and core control with self care, mobility, lifting, ambulation.  [] (41520) Provided verbal/tactile cueing for activities related to improving balance, coordination, kinesthetic sense, posture, motor skill, proprioception  to assist with LE, proximal hip, and core control in self care, mobility, lifting, ambulation and eccentric single leg control. NMR and Therapeutic Activities:    [] (01383 or 03441) Provided verbal/tactile cueing for activities related to improving balance, coordination, kinesthetic sense, posture, motor skill, proprioception and motor activation to allow for proper function of core, proximal hip and LE with self care and ADLs  [] (13523) Gait Re-education- Provided training and instruction to the patient for proper LE, core and proximal hip recruitment and positioning and eccentric body weight control with ambulation re-education including up and down stairs     Home Exercise Program:    Access Code: [email or web address]  URL: [email or web address]  Updated: 04/05/2022  Prepared by: Magno Rocha      Exercises: alternate days between low back and mid back program  · Supine Lower Trunk Rotation - 1 x daily - 4 x weekly - 1 sets - 3 reps - 30 second hold  · Supine Figure 4 Piriformis Stretch - 1 x daily - 4 x weekly - 1 sets - 3 reps - 30 second hold  · Clamshell - 1 x daily - 4 x weekly - 3 sets - 10 reps  · Supine Hip Adduction Isometric with Ball - 1 x daily - 4 x weekly - 1 sets - 10 reps - 10 second hold  · Supine Gluteal Sets - 1 x daily - 4 x weekly - 1 sets - 10 reps - 10 second hold  · Supine Shoulder Flexion Extension AAROM with Dowel - 1 x daily - 4 x weekly - 2 sets - 5 reps  · Supine Transversus Abdominis Bracing with Heel Slide - 1 x daily - 4 x weekly - 2 sets - 5 reps  · Supine March - 1 x daily - 4 x weekly - 2 sets - 5 reps  · Sidelying Thoracic Rotation with Open Book - 1 x daily - 4 x weekly - 1 sets - 3 reps - 30 second hold  · Standing Shoulder Posterior Capsule Stretch - 1 x daily - 4 x weekly - 3 sets - 10 reps  · Seated Mid Back Stretch - 1 x daily - 4 x weekly - 3 sets - 10 reps  · Standing Shoulder Row with Anchored Resistance - 1 x daily - 4 x weekly - 3 sets - 10 reps  · Shoulder extension with resistance - Neutral - 1 x daily - 4 x weekly - 3 sets - 10 reps    [x] (63420) Reviewed/Progressed HEP activities related to strengthening, flexibility, endurance, ROM of core, proximal hip and LE for functional self-care, mobility, lifting and ambulation/stair navigation   [] (56710)Reviewed/Progressed HEP activities related to improving balance, coordination, kinesthetic sense, posture, motor skill, proprioception of core, proximal hip and LE for self care, mobility, lifting, and ambulation/stair navigation      Manual Treatments:    [] (30614) Provided manual therapy to mobilize LE, proximal hip and/or LS spine soft tissue/joints for the purpose of modulating pain, promoting relaxation,  increasing ROM, reducing/eliminating soft tissue swelling/inflammation/restriction, improving soft tissue extensibility and allowing for proper ROM for normal function with self care, mobility, lifting and ambulation. Modalities:   10' ice    Charges:  Timed Code Treatment Minutes: 38   Total Treatment Minutes: 48   Time in: 5:07  Time out: 5:55    [] EVAL (LOW) 10809 (typically 20 minutes face-to-face)  [] EVAL (MOD) 50156 (typically 30 minutes face-to-face)  [] EVAL (HIGH) 11737 (typically 45 minutes face-to-face)  [] RE-EVAL     [x] ZP(37645) x 2    [] IONTO  [] NMR (97711) x     [] VASO  [] Manual (58941) x       [] Other:  [x] TA x1      [] Mech Traction (17538)  [] ES(attended) (58229)      [] ES (un) (03340):     GOALS:   Patient stated goal: get back to walking    []? Progressing: []? Met: []? Not Met: []? Adjusted     Therapist goals for Patient:   Short Term Goals: To be achieved in: 2 weeks 3/24/22  1. Independent in HEP and progression per patient tolerance, in order to prevent re-injury. []? Progressing: [x]? Met: []? Not Met: []? Adjusted   2.  Patient will have a decrease in pain to facilitate improvement in movement, function, and ADLs as indicated by Functional Deficits. []? Progressing: [x]? Met: []? Not Met: []? Adjusted     Long Term Goals: To be achieved in: 8 weeks 5/5/22  1. Disability index score of 20% or less for the [place] to assist with reaching prior level of function. []? Progressing: []? Met: []? Not Met: []? Adjusted  2. Patient will demonstrate increased AROM to [facility] to allow for proper joint functioning as indicated by patients Functional Deficits. []? Progressing: []? Met: []? Not Met: []? Adjusted  3. Patient will demonstrate an increase in BLE strength to 4/5 or greater to allow for proper functional mobility as indicated by patients Functional Deficits. []? Progressing: []? Met: []? Not Met: []? Adjusted  4. Patient will return to ADLs, IADLs and functional activities without increased symptoms or restriction. []? Progressing: []? Met: []? Not Met: []? Adjusted  5. Patient will be able to walk >1 mile and sit for > 60 min without back pain for improved completion of work tasks. []? Progressing: []? Met: []? Not Met: []? Adjusted       Overall Progression Towards Functional goals/ Treatment Progress Update:  [] Patient is progressing as expected towards functional goals listed. [] Progression is slowed due to complexities/Impairments listed. [] Progression has been slowed due to co-morbidities. [x] Plan just implemented, too soon to assess goals progression <30days   [] Goals require adjustment due to lack of progress  [] Patient is not progressing as expected and requires additional follow up with physician   [] Other    Prognosis for POC: [x] Good [] Fair  [] Poor      Patient requires continued skilled intervention: [x] Yes  [] No    Treatment/Activity Tolerance:  [x] Patient able to complete treatment  [] Patient limited by fatigue  [] Patient limited by pain     [] Patient limited by other medical complications  [] Other: Pt tolerated session well.  Able to increase reps on TA exercises and TB rows, reported increased fatigue with all. Pt required cueing during TB ext to avoid initial shoulder shrug and focus on trap activation. Pt reported being tired at end of session. Continues to note relief with ice. Pt requires PT follow up to address ROM, strength and functional mobility deficits. PLAN: See eval  [x] Continue per plan of care [] Alter current plan (see comments above)  [] Plan of care initiated [] Hold pending MD visit [] Discharge      Electronically signed by:  Kandice Aranda, PT, DPT, OCS  Physical Therapist  [id number]  [email or web address]      Note: If patient does not return for scheduled/ recommended follow up visits, this note will serve as a discharge from care along with most recent update on progress.

## 2022-05-10 ENCOUNTER — HOSPITAL ENCOUNTER (OUTPATIENT)
Dept: PHYSICAL THERAPY | Age: 57
Setting detail: THERAPIES SERIES
Discharge: HOME | End: 2022-05-10
Payer: COMMERCIAL

## 2022-05-10 PROCEDURE — 97110 THERAPEUTIC EXERCISES: CPT | Performed by: PHYSICAL THERAPIST

## 2022-05-10 PROCEDURE — 97530 THERAPEUTIC ACTIVITIES: CPT | Performed by: PHYSICAL THERAPIST

## 2022-05-10 NOTE — PLAN OF CARE
[facility] and [address],  Sports Performance and Rehabilitation, [facility] [address]  793 [facility],5[address] Floor   [place]  Phone: [phone]  Fax: [phone]    Physical Therapy Daily Treatment Note  Date:  5/10/2022    Patient Name:  Abi Adkins    :  1965  MRN: [id number]  Restrictions/Precautions:    Medical/Treatment Diagnosis Information:  · Diagnosis: M25.551 (ICD-10-CM) - Right hip pain  · Treatment Diagnosis: M54.50, R53.1, Q30.7  Insurance/Certification information:  PT Insurance Information: [employer]  Physician Information:  Referring Practitioner: Camille Farrell MD  Has the plan of care been signed (Y/N):        []  Yes  [x]  No     Date of Patient follow up with Physician: not scheduled at this time      Is this a Progress Report:     []  Yes  [x]  No        If Yes:  Date Range for reporting period:   Beginning 3/10/22  Ending 22    Progress report will be due (10 Rx or 30 days whichever is less):  24      Recertification will be due (POC Duration  / 90 days whichever is less): 22         Visit # Insurance Allowable Auth Required   IE +7 60 []  Yes [x]  No        Functional Scale:    Date assessed:  [facility] 48/100=48% deficit   3/10/22  [place] 23/100=23% deficit   22  [place] 39/100=39%    5/10/22       Latex Allergy:  [x]NO      []YES  Preferred Language for Healthcare:   [x]English       []other:      Pain level:  6/10     SUBJECTIVE: Pt had stomach flu over the weekend and that \"tore up\" her back but she is feeling better. Pt feels that since beginning PT she is walking better, no longer needs to use a cane or crutch. Standing for a prolonged time is still bothersome, feels tension in her back after about 45 min.     OBJECTIVE:              ROM PROM AROM Comments     Left Right Left Right     Lumbar SB     2 inches from joint line Finger tips to joint line     Trunk rotation  limited by 50% Limited by 50% Limited by 80% Limited by 80%     Trunk flex         Finger tips 3 inches from floor   Trunk ext        3 inches                                                Flexibility Left Right Comments   Hamstrings SLR 70 deg SLR 70 deg     ITB (Obers test) Not assessed Not assessed     Hip flexor(Brennen test) Not assessed Not assessed     gastroc Not assessed Not assessed     Rectus femoris(Elys test) Not assessed Not assessed                  Special  Test Left Right Comments   SLR - -     Crossed SLR - -     ANASTACIO + for tightness + for tightness                            Strength Left Right Comments   Hip flexors 4/5 4/5     Hip extension 4/5 4/5     Hip abduction 4/5 4/5     Hip adduction Not assessed Not assessed     Hip ER 4-/5 4-/5     Hip IR 4-/5 4-/5     Quads 4/5 4/5     Hamstrings 4/5 4/5        Joint mobility:               []?Normal                      [x]? Hypo lumbar, thoracic              []?Hyper     Palpation: Not assessed     Functional Mobility/Transfers: Pt independent     Posture: WFL     Gait: WFL       RESTRICTIONS/PRECAUTIONS:     Exercises/Interventions:     ROM/STRETCHES     Hooklying trunk rotation    Sidelying open books    Figure 4    Knee to chest    Seated cat cow    TRUE stretch               PREs     TA brace    Glute sets    ADD sets    clamshells 3x10 each side, red tied band    Bridges 3x5              TA Heel slide     TA march 3x15 B Holding 2# MB at 90 deg shoulder flex   TA OH reach 3x15 2#MB              Leg press  Hold d/t pain at 3/15 visit   Knee ext     Hamstring curl 3x12 25#         TB rows 3x12 blue    TB ext 3x12 green              Manual interventions     IASTM  HG 9: brushing     Plan for next session: progress as tolerated    Therapeutic Exercise and NMR EXR  [x] (68254) Provided verbal/tactile cueing for activities related to strengthening, flexibility, endurance, ROM for improvements in LE, proximal hip, and core control with self care, mobility, lifting, ambulation.  [] (13940) Provided verbal/tactile cueing for activities related to improving balance, coordination, kinesthetic sense, posture, motor skill, proprioception  to assist with LE, proximal hip, and core control in self care, mobility, lifting, ambulation and eccentric single leg control. NMR and Therapeutic Activities:    [] (62725 or 48545) Provided verbal/tactile cueing for activities related to improving balance, coordination, kinesthetic sense, posture, motor skill, proprioception and motor activation to allow for proper function of core, proximal hip and LE with self care and ADLs  [] (66799) Gait Re-education- Provided training and instruction to the patient for proper LE, core and proximal hip recruitment and positioning and eccentric body weight control with ambulation re-education including up and down stairs     Home Exercise Program:    Access Code: [email or web address]  URL: [email or web address]  Updated: 04/05/2022  Prepared by: Rosendo Menjivar      Exercises: alternate days between low back and mid back program  · Supine Lower Trunk Rotation - 1 x daily - 4 x weekly - 1 sets - 3 reps - 30 second hold  · Supine Figure 4 Piriformis Stretch - 1 x daily - 4 x weekly - 1 sets - 3 reps - 30 second hold  · Clamshell - 1 x daily - 4 x weekly - 3 sets - 10 reps  · Supine Hip Adduction Isometric with Ball - 1 x daily - 4 x weekly - 1 sets - 10 reps - 10 second hold  · Supine Gluteal Sets - 1 x daily - 4 x weekly - 1 sets - 10 reps - 10 second hold  · Supine Shoulder Flexion Extension AAROM with Dowel - 1 x daily - 4 x weekly - 2 sets - 5 reps  · Supine Transversus Abdominis Bracing with Heel Slide - 1 x daily - 4 x weekly - 2 sets - 5 reps  · Supine March - 1 x daily - 4 x weekly - 2 sets - 5 reps  · Sidelying Thoracic Rotation with Open Book - 1 x daily - 4 x weekly - 1 sets - 3 reps - 30 second hold  · Standing Shoulder Posterior Capsule Stretch - 1 x daily - 4 x weekly - 3 sets - 10 reps  · Seated Mid Back Stretch - 1 x daily - 4 x weekly - 3 sets - 10 reps  · Standing Shoulder Row with Anchored Resistance - 1 x daily - 4 x weekly - 3 sets - 10 reps  · Shoulder extension with resistance - Neutral - 1 x daily - 4 x weekly - 3 sets - 10 reps    [x] (45269) Reviewed/Progressed HEP activities related to strengthening, flexibility, endurance, ROM of core, proximal hip and LE for functional self-care, mobility, lifting and ambulation/stair navigation   [] (40782)Reviewed/Progressed HEP activities related to improving balance, coordination, kinesthetic sense, posture, motor skill, proprioception of core, proximal hip and LE for self care, mobility, lifting, and ambulation/stair navigation      Manual Treatments:    [] (52997) Provided manual therapy to mobilize LE, proximal hip and/or LS spine soft tissue/joints for the purpose of modulating pain, promoting relaxation,  increasing ROM, reducing/eliminating soft tissue swelling/inflammation/restriction, improving soft tissue extensibility and allowing for proper ROM for normal function with self care, mobility, lifting and ambulation. Modalities:       Charges:  Timed Code Treatment Minutes: 40   Total Treatment Minutes: 40   Time in: 5:10  Time out: 5:50    [] EVAL (LOW) 09657 (typically 20 minutes face-to-face)  [] EVAL (MOD) 12324 (typically 30 minutes face-to-face)  [] EVAL (HIGH) 11847 (typically 45 minutes face-to-face)  [] RE-EVAL     [x] KM(84146) x 2    [] IONTO  [] NMR (88216) x     [] VASO  [] Manual (01876) x       [] Other:  [x] TA x1      [] Mech Traction (84967)  [] ES(attended) (13254)      [] ES (un) (56730):     GOALS:   Patient stated goal: get back to walking    []? Progressing: []? Met: []? Not Met: []? Adjusted     Therapist goals for Patient:   Short Term Goals: To be achieved in: 2 weeks 3/24/22  1. Independent in HEP and progression per patient tolerance, in order to prevent re-injury. []? Progressing: [x]? Met: []? Not Met: []?  Adjusted 2. Patient will have a decrease in pain to facilitate improvement in movement, function, and ADLs as indicated by Functional Deficits. []? Progressing: [x]? Met: []? Not Met: []? Adjusted     Long Term Goals: To be achieved in: 8 weeks 5/5/22  1. Disability index score of 20% or less for the [place] to assist with reaching prior level of function. []? Progressing: []? Met: []? Not Met: []? Adjusted  2. Patient will demonstrate increased AROM to [facility] to allow for proper joint functioning as indicated by patients Functional Deficits. []? Progressing: []? Met: []? Not Met: []? Adjusted  3. Patient will demonstrate an increase in BLE strength to 4/5 or greater to allow for proper functional mobility as indicated by patients Functional Deficits. []? Progressing: []? Met: []? Not Met: []? Adjusted  4. Patient will return to ADLs, IADLs and functional activities without increased symptoms or restriction. []? Progressing: []? Met: []? Not Met: []? Adjusted  5. Patient will be able to walk >1 mile and sit for > 60 min without back pain for improved completion of work tasks. []? Progressing: []? Met: []? Not Met: []? Adjusted       Overall Progression Towards Functional goals/ Treatment Progress Update:  [] Patient is progressing as expected towards functional goals listed. [] Progression is slowed due to complexities/Impairments listed. [] Progression has been slowed due to co-morbidities.   [x] Plan just implemented, too soon to assess goals progression <30days   [] Goals require adjustment due to lack of progress  [] Patient is not progressing as expected and requires additional follow up with physician   [] Other    Prognosis for POC: [x] Good [] Fair  [] Poor      Patient requires continued skilled intervention: [x] Yes  [] No    Treatment/Activity Tolerance:  [x] Patient able to complete treatment  [] Patient limited by fatigue  [] Patient limited by pain     [] Patient limited by other medical complications  [] Other: Pt has completed 8 weeks of PT. She exhibits improved trunk flexion, extension, side bending and rotation. Pt exhibits improved LE strength to grossly 4/5. Pt does continue to have intermittent back pain but she is now better able to self manage her flare ups. Pt continues to tolerate exercise program well, she remains fatigued by program and tolerates progressions without an increase in symptoms. Pt reports improvements in walking, standing, reaching tasks. Pt does exhibit regressed [place] score from 4/14, but subjectively reports improvements. Pt will be seen 1 more time for final DC planning. Pt requires PT follow up to address ROM, strength and functional mobility deficits. PLAN: See eval  [x] Continue per plan of care [] Alter current plan (see comments above)  [] Plan of care initiated [] Hold pending MD visit [] Discharge      Electronically signed by:  Nish Clayton, PT, DPT, OCS  Physical Therapist  [id number]  [email or web address]      Note: If patient does not return for scheduled/ recommended follow up visits, this note will serve as a discharge from care along with most recent update on progress.

## 2022-05-17 ENCOUNTER — HOSPITAL ENCOUNTER (OUTPATIENT)
Dept: PHYSICAL THERAPY | Age: 57
Setting detail: THERAPIES SERIES
Discharge: HOME | End: 2022-05-17
Payer: COMMERCIAL

## 2022-05-17 PROCEDURE — 97530 THERAPEUTIC ACTIVITIES: CPT | Performed by: PHYSICAL THERAPIST

## 2022-05-17 PROCEDURE — 97110 THERAPEUTIC EXERCISES: CPT | Performed by: PHYSICAL THERAPIST

## 2022-05-17 NOTE — FLOWSHEET NOTE
[facility] and [address],  Sports Performance and Rehabilitation, [facility] 6199 12[address]  [address] [facility],5Th Floor   Susana León  Phone: [phone]  Fax: [phone]    Physical Therapy Daily Treatment Note  Date:  2022    Patient Name:  Mateo Mckeon    :  1965  MRN: [id number]  Restrictions/Precautions:    Medical/Treatment Diagnosis Information:  · Diagnosis: M25.551 (ICD-10-CM) - Right hip pain  · Treatment Diagnosis: M54.50, R53.1, V27.3  Insurance/Certification information:  PT Insurance Information: [id number]  Physician Information:  Referring Practitioner: Rishabh Burnette MD  Has the plan of care been signed (Y/N):        []  Yes  [x]  No     Date of Patient follow up with Physician: not scheduled at this time      Is this a Progress Report:     []  Yes  [x]  No        If Yes:  Date Range for reporting period:   Beginning 3/10/22  Ending 22    Progress report will be due (10 Rx or 30 days whichever is less):  37      Recertification will be due (POC Duration  / 90 days whichever is less): 22         Visit # Insurance Allowable Auth Required   IE +[phone] []  Yes [x]  No        Functional Scale:    Date assessed:  [place] 48/100=48% deficit   3/10/22  [place] 23/100=23% deficit   22  [place] 39/100=39%    5/10/22  [place] 21/100=21% deficit   22       Latex Allergy:  [x]NO      []YES  Preferred Language for Healthcare:   [x]English       []other:      Pain level:  1/10     SUBJECTIVE: Pt states that her back is feeling good but her knees have been bothering her, not sure why though. Pt is comfortable making to her LPV.     OBJECTIVE:              ROM PROM AROM Comments     Left Right Left Right     Lumbar SB     2 inches from joint line Finger tips to joint line     Trunk rotation  limited by 50% Limited by 50% Limited by 80% Limited by 80%     Trunk flex         Finger tips 3 inches from floor   Trunk ext        3 inches                                                Flexibility Left Right Comments   Hamstrings SLR 70 deg SLR 70 deg     ITB (Obers test) Not assessed Not assessed     Hip flexor(Brennen test) Not assessed Not assessed     gastroc Not assessed Not assessed     Rectus femoris(Elys test) Not assessed Not assessed                  Special  Test Left Right Comments   SLR - -     Crossed SLR - -     ANASTACIO + for tightness + for tightness                            Strength Left Right Comments   Hip flexors 4/5 4/5     Hip extension 4/5 4/5     Hip abduction 4/5 4/5     Hip adduction Not assessed Not assessed     Hip ER 4/5 4/5     Hip IR 4/5 4/5     Quads 4/5 4/5     Hamstrings 4/5 4/5        Joint mobility:               []?Normal                      [x]? Hypo lumbar, thoracic              []?Hyper     Palpation: Not assessed     Functional Mobility/Transfers: Pt independent     Posture: WFL     Gait: WFL       RESTRICTIONS/PRECAUTIONS:     Exercises/Interventions:     ROM/STRETCHES     Hooklying trunk rotation     Sidelying open books x10-15 each side    Figure 4 2x30\" each side    Knee to chest 2x30\" each side    Seated cat cow    TRUE stretch               PREs     TA brace    Glute sets    ADD sets    clamshells 3x10 each side, red tied band    Bridges 3x5              TA Heel slide     TA march 3x15 B Holding 2# MB at 90 deg shoulder flex   TA OH reach 3x15 2#MB              Leg press  Hold d/t pain at 3/15 visit   Knee ext     Hamstring curl 3x12 25#         TB rows 3x12 blue    TB ext 3x12 green              Manual interventions     IASTM  HG 9: brushing     Plan for next session: progress as tolerated    Therapeutic Exercise and NMR EXR  [x] (94906) Provided verbal/tactile cueing for activities related to strengthening, flexibility, endurance, ROM for improvements in LE, proximal hip, and core control with self care, mobility, lifting, ambulation.  [] (48388) Provided verbal/tactile cueing for activities related to improving balance, coordination, kinesthetic sense, posture, motor skill, proprioception  to assist with LE, proximal hip, and core control in self care, mobility, lifting, ambulation and eccentric single leg control. NMR and Therapeutic Activities:    [] (62438 or 64977) Provided verbal/tactile cueing for activities related to improving balance, coordination, kinesthetic sense, posture, motor skill, proprioception and motor activation to allow for proper function of core, proximal hip and LE with self care and ADLs  [] (75281) Gait Re-education- Provided training and instruction to the patient for proper LE, core and proximal hip recruitment and positioning and eccentric body weight control with ambulation re-education including up and down stairs     Home Exercise Program:    Access Code: [email or web address]  URL: [email or web address]  Updated: 04/05/2022  Prepared by: Shanita Kohler      Exercises: alternate days between low back and mid back program  · Supine Lower Trunk Rotation - 1 x daily - 4 x weekly - 1 sets - 3 reps - 30 second hold  · Supine Figure 4 Piriformis Stretch - 1 x daily - 4 x weekly - 1 sets - 3 reps - 30 second hold  · Clamshell - 1 x daily - 4 x weekly - 3 sets - 10 reps  · Supine Hip Adduction Isometric with Ball - 1 x daily - 4 x weekly - 1 sets - 10 reps - 10 second hold  · Supine Gluteal Sets - 1 x daily - 4 x weekly - 1 sets - 10 reps - 10 second hold  · Supine Shoulder Flexion Extension AAROM with Dowel - 1 x daily - 4 x weekly - 2 sets - 5 reps  · Supine Transversus Abdominis Bracing with Heel Slide - 1 x daily - 4 x weekly - 2 sets - 5 reps  · Supine March - 1 x daily - 4 x weekly - 2 sets - 5 reps  · Sidelying Thoracic Rotation with Open Book - 1 x daily - 4 x weekly - 1 sets - 3 reps - 30 second hold  · Standing Shoulder Posterior Capsule Stretch - 1 x daily - 4 x weekly - 3 sets - 10 reps  · Seated Mid Back Stretch - 1 x daily - 4 x weekly - 3 sets - 10 reps  · Standing Shoulder Row with Anchored Resistance - 1 x daily - 4 x weekly - 3 sets - 10 reps  · Shoulder extension with resistance - Neutral - 1 x daily - 4 x weekly - 3 sets - 10 reps    [x] (81993) Reviewed/Progressed HEP activities related to strengthening, flexibility, endurance, ROM of core, proximal hip and LE for functional self-care, mobility, lifting and ambulation/stair navigation   [] (58177)Reviewed/Progressed HEP activities related to improving balance, coordination, kinesthetic sense, posture, motor skill, proprioception of core, proximal hip and LE for self care, mobility, lifting, and ambulation/stair navigation      Manual Treatments:    [] (32826) Provided manual therapy to mobilize LE, proximal hip and/or LS spine soft tissue/joints for the purpose of modulating pain, promoting relaxation,  increasing ROM, reducing/eliminating soft tissue swelling/inflammation/restriction, improving soft tissue extensibility and allowing for proper ROM for normal function with self care, mobility, lifting and ambulation. Modalities:       Charges:  Timed Code Treatment Minutes: 38   Total Treatment Minutes: 38   Time in: 5:09  Time out: 5:54    [] EVAL (LOW) 40347 (typically 20 minutes face-to-face)  [] EVAL (MOD) 63716 (typically 30 minutes face-to-face)  [] EVAL (HIGH) 81214 (typically 45 minutes face-to-face)  [] RE-EVAL     [x] VE(74154) x 2    [] IONTO  [] NMR (70158) x     [] VASO  [] Manual (71146) x       [] Other:  [x] TA x1      [] Mech Traction (82755)  [] ES(attended) (49461)      [] ES (un) (78388):     GOALS:   Patient stated goal: get back to walking    []? Progressing: []? Met: []? Not Met: []? Adjusted     Therapist goals for Patient:   Short Term Goals: To be achieved in: 2 weeks 3/24/22  1. Independent in HEP and progression per patient tolerance, in order to prevent re-injury. []? Progressing: [x]? Met: []? Not Met: []? Adjusted   2.  Patient will have a decrease in pain to facilitate improvement improved trunk flexion, extension, side bending and rotation. Pt exhibits improved LE strength to grossly 4/5. Pt does continue to have intermittent back pain but she is now better able to self manage her flare ups. [place] score improved by >50%. Pt requires continued strengthening of back and LEs but does not require skilled PT at this time. Pt demo's sound understanding of HEP. Pt is in agreement with DC plan. Pt requires PT follow up to address ROM, strength and functional mobility deficits. PLAN: See eval  [x] Continue per plan of care [] Alter current plan (see comments above)  [] Plan of care initiated [] Hold pending MD visit [] Discharge      Electronically signed by:  Lonnie Rice PT, DPT, OCS  Physical Therapist  [id number]  [email or web address]      Note: If patient does not return for scheduled/ recommended follow up visits, this note will serve as a discharge from care along with most recent update on progress.

## 2022-08-22 ENCOUNTER — OFFICE VISIT (OUTPATIENT)
Dept: FAMILY MEDICINE CLINIC | Age: 57
End: 2022-08-22
Payer: COMMERCIAL

## 2022-08-22 VITALS
RESPIRATION RATE: 16 BRPM | SYSTOLIC BLOOD PRESSURE: 124 MMHG | HEIGHT: 59 IN | WEIGHT: 204.2 LBS | OXYGEN SATURATION: 98 % | HEART RATE: 82 BPM | BODY MASS INDEX: 41.16 KG/M2 | TEMPERATURE: 98.4 F | DIASTOLIC BLOOD PRESSURE: 80 MMHG

## 2022-08-22 DIAGNOSIS — G89.29 CHRONIC NECK PAIN: Primary | ICD-10-CM

## 2022-08-22 DIAGNOSIS — M54.2 CHRONIC NECK PAIN: Primary | ICD-10-CM

## 2022-08-22 DIAGNOSIS — E11.65 UNCONTROLLED TYPE 2 DIABETES MELLITUS WITH HYPERGLYCEMIA (HCC): ICD-10-CM

## 2022-08-22 PROCEDURE — 99214 OFFICE O/P EST MOD 30 MIN: CPT | Performed by: FAMILY MEDICINE

## 2022-08-22 RX ORDER — TIZANIDINE 2 MG/1
2 TABLET ORAL NIGHTLY PRN
Qty: 20 TABLET | Refills: 0 | Status: SHIPPED | OUTPATIENT
Start: 2022-08-22

## 2022-08-22 RX ORDER — NAPROXEN 500 MG/1
500 TABLET ORAL 2 TIMES DAILY PRN
Qty: 60 TABLET | Refills: 1 | Status: SHIPPED | OUTPATIENT
Start: 2022-08-22

## 2022-08-22 SDOH — ECONOMIC STABILITY: FOOD INSECURITY: WITHIN THE PAST 12 MONTHS, YOU WORRIED THAT YOUR FOOD WOULD RUN OUT BEFORE YOU GOT MONEY TO BUY MORE.: NEVER TRUE

## 2022-08-22 SDOH — ECONOMIC STABILITY: FOOD INSECURITY: WITHIN THE PAST 12 MONTHS, THE FOOD YOU BOUGHT JUST DIDN'T LAST AND YOU DIDN'T HAVE MONEY TO GET MORE.: NEVER TRUE

## 2022-08-22 ASSESSMENT — ENCOUNTER SYMPTOMS
SHORTNESS OF BREATH: 0
BACK PAIN: 0
CHEST TIGHTNESS: 0
COLOR CHANGE: 0

## 2022-08-22 ASSESSMENT — PATIENT HEALTH QUESTIONNAIRE - PHQ9
2. FEELING DOWN, DEPRESSED OR HOPELESS: 0
SUM OF ALL RESPONSES TO PHQ QUESTIONS 1-9: 0
1. LITTLE INTEREST OR PLEASURE IN DOING THINGS: 0
SUM OF ALL RESPONSES TO PHQ9 QUESTIONS 1 & 2: 0
SUM OF ALL RESPONSES TO PHQ QUESTIONS 1-9: 0

## 2022-08-22 ASSESSMENT — SOCIAL DETERMINANTS OF HEALTH (SDOH): HOW HARD IS IT FOR YOU TO PAY FOR THE VERY BASICS LIKE FOOD, HOUSING, MEDICAL CARE, AND HEATING?: NOT HARD AT ALL

## 2022-08-22 NOTE — PROGRESS NOTES
Subjective:      Patient ID: Barbara Waller is a 65 y.o. female. Neck Pain   This is a chronic problem. The current episode started more than 1 year ago (aprox 2 months). The problem occurs intermittently. The problem has been waxing and waning. The pain is associated with a sleep position. The quality of the pain is described as aching. Pain scale: at its worse 8-9/10 , Exacerbated by: after resting and twisting. Pertinent negatives include no chest pain or fever. She has tried NSAIDs (icy hot) for the symptoms. The treatment provided mild relief. Hx of ddd cervical dx years ago     DM   denies polyuria/polydipsia/polyphagia/paresthesias/wt gain or loss,   Glucose: 160s fasting    Review of Systems   Constitutional:  Negative for activity change, appetite change, fatigue and fever. Respiratory:  Negative for chest tightness and shortness of breath. Cardiovascular:  Negative for chest pain and palpitations. Musculoskeletal:  Positive for neck pain and neck stiffness. Negative for arthralgias and back pain. Skin:  Negative for color change and pallor. Hematological:  Negative for adenopathy. Psychiatric/Behavioral:  Positive for sleep disturbance. Objective:  Blood pressure 124/80, pulse 82, temperature 98.4 °F (36.9 °C), temperature source Temporal, resp. rate 16, height 4' 11\" (1.499 m), weight 204 lb 3.2 oz (92.6 kg), SpO2 98 %, not currently breastfeeding. Physical Exam  Vitals and nursing note reviewed. Constitutional:       General: She is not in acute distress. Appearance: Normal appearance. She is obese. She is not ill-appearing, toxic-appearing or diaphoretic. HENT:      Head: Normocephalic and atraumatic. Eyes:      General:         Right eye: No discharge. Left eye: No discharge. Extraocular Movements: Extraocular movements intact. Conjunctiva/sclera: Conjunctivae normal.      Pupils: Pupils are equal, round, and reactive to light.    Neck: Vascular: No carotid bruit. Pulmonary:      Effort: No respiratory distress. Musculoskeletal:      Cervical back: Normal range of motion. Rigidity and tenderness present. Lymphadenopathy:      Cervical: No cervical adenopathy. Skin:     Coloration: Skin is not pale. Neurological:      General: No focal deficit present. Mental Status: She is alert and oriented to person, place, and time. Mental status is at baseline. Psychiatric:         Mood and Affect: Mood normal.         Behavior: Behavior normal.       Assessment:       Diagnosis Orders   1. Chronic neck pain  XR CERVICAL SPINE (2-3 VIEWS)    [facility]    tiZANidine (ZANAFLEX) 2 MG tablet    naproxen (NAPROSYN) 500 MG tablet      2. Uncontrolled type 2 diabetes mellitus with hyperglycemia (HCC)  Comprehensive Metabolic Panel    Hemoglobin A1C    Microalbumin / Creatinine Urine Ratio    Lipid Panel              Plan:      Naproxen bid   Topical lidocaine bid prn   Tizanidine hs prn   Referred for XR and PT     2. Poor follow up and adherence  Check labs   Fu in 2 weeks.             Sharmaine Oliva MD

## 2022-08-23 LAB
A/G RATIO: 1 (ref 1.2–2.2)
ALBUMIN SERPL-MCNC: 3.8 G/DL (ref 3.8–4.9)
ALP BLD-CCNC: 112 IU/L (ref 44–121)
ALT SERPL-CCNC: 12 IU/L (ref 0–32)
AMBIGUOUS ABBREVIATION: NORMAL
AMBIGUOUS ABBREVIATION: NORMAL
AST SERPL-CCNC: 12 IU/L (ref 0–40)
BILIRUB SERPL-MCNC: 0.4 MG/DL (ref 0–1.2)
BUN / CREAT RATIO: 17 (ref 9–23)
BUN BLDV-MCNC: 9 MG/DL (ref 6–24)
CALCIUM SERPL-MCNC: 9.5 MG/DL (ref 8.7–10.2)
CHLORIDE BLD-SCNC: 99 MMOL/L (ref 96–106)
CHOLESTEROL, TOTAL: 163 MG/DL (ref 100–199)
CO2: 24 MMOL/L (ref 20–29)
COMMENT: NORMAL
CREAT SERPL-MCNC: 0.53 MG/DL (ref 0.57–1)
CREATININE URINE: 84 MG/DL
EGFR (CKD-EPI): 108 ML/MIN/1.73
GLOBULIN: 3.9 G/DL (ref 1.5–4.5)
GLUCOSE BLD-MCNC: 248 MG/DL (ref 65–99)
HBA1C MFR BLD: 7.1 % (ref 4.8–5.6)
HDLC SERPL-MCNC: 51 MG/DL
LDL CHOLESTEROL CALCULATED: 88 MG/DL (ref 0–99)
MICROALBUMIN UR-MCNC: 3.9 UG/ML
MICROALBUMIN/CREAT UR-RTO: 5 MG/G CREAT (ref 0–29)
POTASSIUM SERPL-SCNC: 4.3 MMOL/L (ref 3.5–5.2)
SODIUM BLD-SCNC: 136 MMOL/L (ref 134–144)
TOTAL PROTEIN: 7.7 G/DL (ref 6–8.5)
TRIGL SERPL-MCNC: 139 MG/DL (ref 0–149)
VLDLC SERPL CALC-MCNC: 24 MG/DL (ref 5–40)

## 2022-08-25 DIAGNOSIS — Z79.4 TYPE 2 DIABETES MELLITUS WITHOUT COMPLICATION, WITH LONG-TERM CURRENT USE OF INSULIN (HCC): ICD-10-CM

## 2022-08-25 DIAGNOSIS — E11.9 TYPE 2 DIABETES MELLITUS WITHOUT COMPLICATION, WITH LONG-TERM CURRENT USE OF INSULIN (HCC): ICD-10-CM

## 2022-08-25 RX ORDER — METFORMIN HYDROCHLORIDE 500 MG/1
500 TABLET, EXTENDED RELEASE ORAL
Qty: 90 TABLET | Refills: 1 | Status: SHIPPED | OUTPATIENT
Start: 2022-08-25

## 2022-08-25 NOTE — TELEPHONE ENCOUNTER
[facility] Results:  Per Dr. Virgilio Toledo:  -Sugar very High  -Kidneys Normal  -Liver Normal  -Cholesterol at goal

## 2022-08-25 NOTE — TELEPHONE ENCOUNTER
Pt called nedeing a refill of her Metformin.   She has been out of this med and that is why her BS was very high on lab reports    Med pended    Last OV:  8-22-22

## 2022-11-23 DIAGNOSIS — Z79.4 TYPE 2 DIABETES MELLITUS WITHOUT COMPLICATION, WITH LONG-TERM CURRENT USE OF INSULIN (HCC): ICD-10-CM

## 2022-11-23 DIAGNOSIS — E11.9 TYPE 2 DIABETES MELLITUS WITHOUT COMPLICATION, WITH LONG-TERM CURRENT USE OF INSULIN (HCC): ICD-10-CM

## 2022-11-23 RX ORDER — METFORMIN HYDROCHLORIDE 500 MG/1
500 TABLET, EXTENDED RELEASE ORAL
Qty: 90 TABLET | Refills: 1 | Status: SHIPPED | OUTPATIENT
Start: 2022-11-23

## 2022-12-16 ENCOUNTER — TELEPHONE (OUTPATIENT)
Dept: FAMILY MEDICINE CLINIC | Age: 57
End: 2022-12-16

## 2022-12-16 DIAGNOSIS — R05.1 ACUTE COUGH: Primary | ICD-10-CM

## 2022-12-16 RX ORDER — BENZONATATE 100 MG/1
100 CAPSULE ORAL 3 TIMES DAILY PRN
Qty: 30 CAPSULE | Refills: 0 | Status: SHIPPED | OUTPATIENT
Start: 2022-12-16 | End: 2022-12-23

## 2022-12-16 NOTE — TELEPHONE ENCOUNTER
rx transmitted electronically to the pharmacy via [employer]   Let patient know and verify pharmacy    If sx persist will need an apt ok with NP next week

## 2022-12-16 NOTE — TELEPHONE ENCOUNTER
----- Message from Kaley Mathew sent at 12/16/2022  1:07 PM EST -----  Subject: Message to Provider    QUESTIONS  Information for Provider? PT wants to know if PCP, Xavier Lui will   prescribe a cough medicine for her that will dry up her mucus and help   stop her cough. PT says if PCP wants to see her for an appointment to let   her know but she is hoping she will call in a prescription for cough   medicine instead. Please reach out to the PT to discuss. ---------------------------------------------------------------------------  --------------  Yoshi Acharya [facility]  [phone]; OK to leave message on voicemail  ---------------------------------------------------------------------------  --------------  SCRIPT ANSWERS  Relationship to Patient?  Self

## 2023-02-16 DIAGNOSIS — Z79.4 TYPE 2 DIABETES MELLITUS WITHOUT COMPLICATION, WITH LONG-TERM CURRENT USE OF INSULIN (HCC): ICD-10-CM

## 2023-02-16 DIAGNOSIS — E11.9 TYPE 2 DIABETES MELLITUS WITHOUT COMPLICATION, WITH LONG-TERM CURRENT USE OF INSULIN (HCC): ICD-10-CM

## 2023-02-16 RX ORDER — METFORMIN HYDROCHLORIDE 500 MG/1
500 TABLET, EXTENDED RELEASE ORAL
Qty: 90 TABLET | Refills: 0 | Status: SHIPPED | OUTPATIENT
Start: 2023-02-16

## 2023-05-26 ENCOUNTER — TELEPHONE (OUTPATIENT)
Dept: FAMILY MEDICINE CLINIC | Age: 58
End: 2023-05-26

## 2023-05-26 DIAGNOSIS — E11.9 TYPE 2 DIABETES MELLITUS WITHOUT COMPLICATION, WITH LONG-TERM CURRENT USE OF INSULIN (HCC): ICD-10-CM

## 2023-05-26 DIAGNOSIS — Z79.4 TYPE 2 DIABETES MELLITUS WITHOUT COMPLICATION, WITH LONG-TERM CURRENT USE OF INSULIN (HCC): ICD-10-CM

## 2023-05-26 RX ORDER — METFORMIN HYDROCHLORIDE 500 MG/1
500 TABLET, EXTENDED RELEASE ORAL
Qty: 90 TABLET | Refills: 1 | Status: SHIPPED | OUTPATIENT
Start: 2023-05-26

## 2023-05-26 NOTE — TELEPHONE ENCOUNTER
Patient called requesting refill on Metformin.  Patient would like prescription to go to [facility] on [address]      metFORMIN (GLUCOPHAGE-XR) 500 MG extended release tablet         Last Office Visit: 08/22/2022

## 2023-05-26 NOTE — TELEPHONE ENCOUNTER
Medication:   Requested Prescriptions     Pending Prescriptions Disp Refills    metFORMIN (GLUCOPHAGE-XR) 500 MG extended release tablet 90 tablet 0     Sig: Take 1 tablet by mouth daily (with breakfast)        Last Filled:      Patient Phone Number: [phone] (home)     Last appt: 8/22/2022   Next appt: 6/29/2023    Last OARRS: No flowsheet data found.

## 2024-03-05 ENCOUNTER — OFFICE VISIT (OUTPATIENT)
Dept: FAMILY MEDICINE CLINIC | Age: 59
End: 2024-03-05
Payer: COMMERCIAL

## 2024-03-05 VITALS
DIASTOLIC BLOOD PRESSURE: 72 MMHG | HEART RATE: 89 BPM | BODY MASS INDEX: 41.37 KG/M2 | SYSTOLIC BLOOD PRESSURE: 120 MMHG | OXYGEN SATURATION: 99 % | WEIGHT: 205.2 LBS | HEIGHT: 59 IN

## 2024-03-05 DIAGNOSIS — J10.1 INFLUENZA A: Primary | ICD-10-CM

## 2024-03-05 DIAGNOSIS — R05.1 ACUTE COUGH: ICD-10-CM

## 2024-03-05 LAB
INFLUENZA A ANTIBODY: POSITIVE
INFLUENZA B ANTIBODY: NORMAL
S PYO AG THROAT QL: NORMAL
SARS-COV-2: NORMAL

## 2024-03-05 PROCEDURE — 87880 STREP A ASSAY W/OPTIC: CPT | Performed by: FAMILY MEDICINE

## 2024-03-05 PROCEDURE — 99214 OFFICE O/P EST MOD 30 MIN: CPT | Performed by: FAMILY MEDICINE

## 2024-03-05 PROCEDURE — 87804 INFLUENZA ASSAY W/OPTIC: CPT | Performed by: FAMILY MEDICINE

## 2024-03-05 RX ORDER — OSELTAMIVIR PHOSPHATE 75 MG/1
75 CAPSULE ORAL 2 TIMES DAILY
Qty: 10 CAPSULE | Refills: 0 | Status: SHIPPED | OUTPATIENT
Start: 2024-03-05 | End: 2024-03-10

## 2024-03-05 SDOH — ECONOMIC STABILITY: HOUSING INSECURITY
IN THE LAST 12 MONTHS, WAS THERE A TIME WHEN YOU DID NOT HAVE A STEADY PLACE TO SLEEP OR SLEPT IN A SHELTER (INCLUDING NOW)?: NO

## 2024-03-05 SDOH — ECONOMIC STABILITY: FOOD INSECURITY: WITHIN THE PAST 12 MONTHS, THE FOOD YOU BOUGHT JUST DIDN'T LAST AND YOU DIDN'T HAVE MONEY TO GET MORE.: NEVER TRUE

## 2024-03-05 SDOH — ECONOMIC STABILITY: INCOME INSECURITY: HOW HARD IS IT FOR YOU TO PAY FOR THE VERY BASICS LIKE FOOD, HOUSING, MEDICAL CARE, AND HEATING?: NOT HARD AT ALL

## 2024-03-05 SDOH — ECONOMIC STABILITY: FOOD INSECURITY: WITHIN THE PAST 12 MONTHS, YOU WORRIED THAT YOUR FOOD WOULD RUN OUT BEFORE YOU GOT MONEY TO BUY MORE.: NEVER TRUE

## 2024-03-05 ASSESSMENT — ENCOUNTER SYMPTOMS
HEMOPTYSIS: 0
RHINORRHEA: 1
COLOR CHANGE: 0
CHEST TIGHTNESS: 1
COUGH: 1
SHORTNESS OF BREATH: 0
ABDOMINAL DISTENTION: 1
WHEEZING: 0
NAUSEA: 1
HEARTBURN: 0
SORE THROAT: 0

## 2024-03-05 ASSESSMENT — PATIENT HEALTH QUESTIONNAIRE - PHQ9
SUM OF ALL RESPONSES TO PHQ9 QUESTIONS 1 & 2: 0
SUM OF ALL RESPONSES TO PHQ QUESTIONS 1-9: 0
1. LITTLE INTEREST OR PLEASURE IN DOING THINGS: 0
2. FEELING DOWN, DEPRESSED OR HOPELESS: 0
SUM OF ALL RESPONSES TO PHQ QUESTIONS 1-9: 0

## 2024-03-05 NOTE — PROGRESS NOTES
Subjective:      Patient ID: Clari Hernandez is a 59 y.o. female.    Cough  This is a new problem. Episode onset: 3 days. The problem has been unchanged. The problem occurs every few minutes. The cough is Productive of sputum. Associated symptoms include chills, a fever, myalgias, nasal congestion and rhinorrhea. Pertinent negatives include no chest pain, ear congestion, ear pain, headaches, heartburn, hemoptysis, postnasal drip, rash, sore throat, shortness of breath, sweats, weight loss or wheezing. Nothing aggravates the symptoms. She has tried nothing for the symptoms. There is no history of asthma.   Sister dx w flu last week       Review of Systems   Constitutional:  Positive for chills and fever. Negative for weight loss.   HENT:  Positive for rhinorrhea. Negative for ear pain, postnasal drip and sore throat.    Eyes:  Negative for visual disturbance.   Respiratory:  Positive for cough and chest tightness. Negative for hemoptysis, shortness of breath and wheezing.    Cardiovascular:  Negative for chest pain.   Gastrointestinal:  Positive for abdominal distention and nausea. Negative for heartburn.   Musculoskeletal:  Positive for myalgias.   Skin:  Negative for color change and rash.   Neurological:  Negative for dizziness and headaches.   Psychiatric/Behavioral:  Positive for sleep disturbance.        Objective:  Blood pressure 120/72, pulse 89, height 1.499 m (4' 11\"), weight 93.1 kg (205 lb 3.2 oz), SpO2 99 %, not currently breastfeeding.       Physical Exam  Vitals and nursing note reviewed.   Constitutional:       General: She is not in acute distress.     Appearance: Normal appearance. She is well-developed. She is obese. She is not toxic-appearing or diaphoretic.   HENT:      Head: Normocephalic.      Right Ear: Tympanic membrane, ear canal and external ear normal. There is no impacted cerumen.      Left Ear: Tympanic membrane, ear canal and external ear normal. There is no impacted cerumen.      Nose:

## 2024-03-29 ENCOUNTER — OFFICE VISIT (OUTPATIENT)
Dept: FAMILY MEDICINE CLINIC | Age: 59
End: 2024-03-29
Payer: COMMERCIAL

## 2024-03-29 VITALS
WEIGHT: 199 LBS | RESPIRATION RATE: 16 BRPM | DIASTOLIC BLOOD PRESSURE: 76 MMHG | BODY MASS INDEX: 40.12 KG/M2 | HEIGHT: 59 IN | OXYGEN SATURATION: 94 % | TEMPERATURE: 98.2 F | HEART RATE: 98 BPM | SYSTOLIC BLOOD PRESSURE: 122 MMHG

## 2024-03-29 DIAGNOSIS — R07.81 RIB PAIN ON RIGHT SIDE: Primary | ICD-10-CM

## 2024-03-29 PROBLEM — S72.436G: Status: RESOLVED | Noted: 2021-05-14 | Resolved: 2024-03-29

## 2024-03-29 PROCEDURE — 99213 OFFICE O/P EST LOW 20 MIN: CPT | Performed by: FAMILY MEDICINE

## 2024-03-29 RX ORDER — CYCLOBENZAPRINE HCL 10 MG
10 TABLET ORAL 3 TIMES DAILY PRN
COMMUNITY

## 2024-03-29 RX ORDER — OXYCODONE HYDROCHLORIDE 5 MG/1
5 CAPSULE ORAL EVERY 4 HOURS PRN
COMMUNITY

## 2024-03-29 RX ORDER — TRAMADOL HYDROCHLORIDE 50 MG/1
50 TABLET ORAL EVERY 8 HOURS PRN
Qty: 15 TABLET | Refills: 0 | Status: SHIPPED | OUTPATIENT
Start: 2024-03-29 | End: 2024-04-03

## 2024-03-29 ASSESSMENT — ENCOUNTER SYMPTOMS
EYE ITCHING: 0
SORE THROAT: 0
EYE PAIN: 0
SHORTNESS OF BREATH: 0
ABDOMINAL PAIN: 0
COUGH: 0
RHINORRHEA: 0
EYES NEGATIVE: 1
SINUS PRESSURE: 0
WHEEZING: 0

## 2024-03-29 NOTE — PROGRESS NOTES
Clari Hernandez (:  1965) is a 59 y.o. female,Established patient, here for evaluation of the following chief complaint(s):  No chief complaint on file.          Subjective   SUBJECTIVE/OBJECTIVE:  HPI  59-year-old female patient here for ED follow-up  She was seen at [facility] on 3/26/2024 for right-sided rib pain.  Patient was bending down to reach into her deep freezer when she heard a pop or snap on the right side.  Workup was essentially unremarkable including normal chest x-ray and normal rib x-ray.  Patient was given full dose of oxycodone and Flexeril and ibuprofen.  Patient still complains of soreness on touch and pain on deep breathing.  She has 2 pills of oxycodone left.  She works in  and is requiring work restrictions for pulling and lifting.    Review of Systems   Constitutional: Negative.  Negative for fatigue.   HENT:  Negative for congestion, ear pain, rhinorrhea, sinus pressure and sore throat.    Eyes: Negative.  Negative for pain and itching.   Respiratory:  Negative for cough, shortness of breath and wheezing.    Cardiovascular:  Negative for chest pain and palpitations.   Gastrointestinal:  Negative for abdominal pain.   Genitourinary:  Negative for frequency and urgency.   Musculoskeletal:  Negative for gait problem.        Right side rib pain   Skin:  Negative for rash.   Neurological:  Negative for dizziness and headaches.   Psychiatric/Behavioral:  The patient is not nervous/anxious.           Objective   Physical Exam  Constitutional:       Appearance: Normal appearance.   HENT:      Head: Normocephalic and atraumatic.   Cardiovascular:      Rate and Rhythm: Normal rate and regular rhythm.   Pulmonary:      Effort: Pulmonary effort is normal.      Breath sounds: Normal breath sounds. No wheezing.   Musculoskeletal:      Comments: Right rib tenderness below right breast   Neurological:      Mental Status: She is alert.   Psychiatric:         Mood and Affect: Mood normal.

## 2024-04-12 ENCOUNTER — OFFICE VISIT (OUTPATIENT)
Dept: FAMILY MEDICINE CLINIC | Age: 59
End: 2024-04-12
Payer: COMMERCIAL

## 2024-04-12 VITALS
WEIGHT: 206 LBS | SYSTOLIC BLOOD PRESSURE: 110 MMHG | BODY MASS INDEX: 41.53 KG/M2 | HEART RATE: 85 BPM | TEMPERATURE: 97.5 F | OXYGEN SATURATION: 96 % | HEIGHT: 59 IN | DIASTOLIC BLOOD PRESSURE: 64 MMHG

## 2024-04-12 DIAGNOSIS — R07.81 RIB PAIN ON RIGHT SIDE: Primary | ICD-10-CM

## 2024-04-12 PROCEDURE — 99213 OFFICE O/P EST LOW 20 MIN: CPT | Performed by: FAMILY MEDICINE

## 2024-04-12 ASSESSMENT — ANXIETY QUESTIONNAIRES
5. BEING SO RESTLESS THAT IT IS HARD TO SIT STILL: NOT AT ALL
GAD7 TOTAL SCORE: 2
4. TROUBLE RELAXING: NOT AT ALL
3. WORRYING TOO MUCH ABOUT DIFFERENT THINGS: SEVERAL DAYS
IF YOU CHECKED OFF ANY PROBLEMS ON THIS QUESTIONNAIRE, HOW DIFFICULT HAVE THESE PROBLEMS MADE IT FOR YOU TO DO YOUR WORK, TAKE CARE OF THINGS AT HOME, OR GET ALONG WITH OTHER PEOPLE: NOT DIFFICULT AT ALL
7. FEELING AFRAID AS IF SOMETHING AWFUL MIGHT HAPPEN: NOT AT ALL
2. NOT BEING ABLE TO STOP OR CONTROL WORRYING: NOT AT ALL
1. FEELING NERVOUS, ANXIOUS, OR ON EDGE: SEVERAL DAYS
6. BECOMING EASILY ANNOYED OR IRRITABLE: NOT AT ALL

## 2024-04-12 ASSESSMENT — ENCOUNTER SYMPTOMS
EYE PAIN: 0
WHEEZING: 0
EYES NEGATIVE: 1
SINUS PRESSURE: 0
SORE THROAT: 0
SHORTNESS OF BREATH: 0
EYE ITCHING: 0
COUGH: 0
RHINORRHEA: 0
ABDOMINAL PAIN: 0

## 2024-04-12 ASSESSMENT — PATIENT HEALTH QUESTIONNAIRE - PHQ9
4. FEELING TIRED OR HAVING LITTLE ENERGY: NOT AT ALL
7. TROUBLE CONCENTRATING ON THINGS, SUCH AS READING THE NEWSPAPER OR WATCHING TELEVISION: NOT AT ALL
SUM OF ALL RESPONSES TO PHQ QUESTIONS 1-9: 1
9. THOUGHTS THAT YOU WOULD BE BETTER OFF DEAD, OR OF HURTING YOURSELF: NOT AT ALL
1. LITTLE INTEREST OR PLEASURE IN DOING THINGS: NOT AT ALL
8. MOVING OR SPEAKING SO SLOWLY THAT OTHER PEOPLE COULD HAVE NOTICED. OR THE OPPOSITE, BEING SO FIGETY OR RESTLESS THAT YOU HAVE BEEN MOVING AROUND A LOT MORE THAN USUAL: NOT AT ALL
6. FEELING BAD ABOUT YOURSELF - OR THAT YOU ARE A FAILURE OR HAVE LET YOURSELF OR YOUR FAMILY DOWN: NOT AT ALL
SUM OF ALL RESPONSES TO PHQ QUESTIONS 1-9: 1
SUM OF ALL RESPONSES TO PHQ9 QUESTIONS 1 & 2: 0
SUM OF ALL RESPONSES TO PHQ QUESTIONS 1-9: 1
5. POOR APPETITE OR OVEREATING: NOT AT ALL
3. TROUBLE FALLING OR STAYING ASLEEP: SEVERAL DAYS
10. IF YOU CHECKED OFF ANY PROBLEMS, HOW DIFFICULT HAVE THESE PROBLEMS MADE IT FOR YOU TO DO YOUR WORK, TAKE CARE OF THINGS AT HOME, OR GET ALONG WITH OTHER PEOPLE: NOT DIFFICULT AT ALL
2. FEELING DOWN, DEPRESSED OR HOPELESS: NOT AT ALL
SUM OF ALL RESPONSES TO PHQ QUESTIONS 1-9: 1

## 2024-04-12 NOTE — PROGRESS NOTES
Clari Hernandez (:  1965) is a 59 y.o. female,Established patient, here for evaluation of the following chief complaint(s):  Follow-up (Pt stopped taking her tramadol and the muscle relaxer due to a rash pt under her arms pt don't know if it's from one of the med's)          Subjective   SUBJECTIVE/OBJECTIVE:  HPI  59-year-old female patient here for follow-up rib pain.  X-ray right-sided was normal patient was prescribed Flexeril and tramadol.  Patient does say that tramadol has caused her some rash and itching but the pain has resolved after taking the muscle relaxant    Review of Systems   Constitutional: Negative.  Negative for fatigue.   HENT:  Negative for congestion, ear pain, rhinorrhea, sinus pressure and sore throat.    Eyes: Negative.  Negative for pain and itching.   Respiratory:  Negative for cough, shortness of breath and wheezing.    Cardiovascular:  Negative for chest pain and palpitations.   Gastrointestinal:  Negative for abdominal pain.   Genitourinary:  Negative for frequency and urgency.   Musculoskeletal:  Negative for gait problem.   Skin:  Negative for rash.   Neurological:  Negative for dizziness and headaches.   Psychiatric/Behavioral:  The patient is not nervous/anxious.           Objective   Physical Exam  Constitutional:       Appearance: Normal appearance.   HENT:      Head: Normocephalic and atraumatic.   Cardiovascular:      Rate and Rhythm: Normal rate and regular rhythm.   Pulmonary:      Effort: Pulmonary effort is normal.      Breath sounds: Normal breath sounds. No wheezing.   Neurological:      Mental Status: She is alert.   Psychiatric:         Mood and Affect: Mood normal.                     ASSESSMENT/PLAN:  1. Rib pain on right side  Add tramadol to allergy list.  Pain has resolved               An electronic signature was used to authenticate this note.    --Chinyere Bhandari MD     This note was generated completely or in part utilizing Dragon dictation speech

## 2024-06-24 ENCOUNTER — COMMUNITY OUTREACH (OUTPATIENT)
Dept: FAMILY MEDICINE CLINIC | Age: 59
End: 2024-06-24

## 2024-06-24 NOTE — PROGRESS NOTES
Patient's HM shows they are overdue for Mammogram and A1C.  Care Everywhere and  files searched.  No results to attach to order nor HM updated.

## 2024-09-16 ENCOUNTER — OFFICE VISIT (OUTPATIENT)
Dept: FAMILY MEDICINE CLINIC | Age: 59
End: 2024-09-16
Payer: COMMERCIAL

## 2024-09-16 VITALS
BODY MASS INDEX: 41.33 KG/M2 | DIASTOLIC BLOOD PRESSURE: 68 MMHG | WEIGHT: 205 LBS | OXYGEN SATURATION: 97 % | TEMPERATURE: 98 F | HEIGHT: 59 IN | HEART RATE: 80 BPM | SYSTOLIC BLOOD PRESSURE: 110 MMHG | RESPIRATION RATE: 16 BRPM

## 2024-09-16 DIAGNOSIS — J01.90 ACUTE BACTERIAL SINUSITIS: Primary | ICD-10-CM

## 2024-09-16 DIAGNOSIS — B96.89 ACUTE BACTERIAL SINUSITIS: Primary | ICD-10-CM

## 2024-09-16 PROCEDURE — 99213 OFFICE O/P EST LOW 20 MIN: CPT | Performed by: NURSE PRACTITIONER

## 2024-09-16 RX ORDER — METHYLPREDNISOLONE 4 MG
TABLET, DOSE PACK ORAL
Qty: 1 KIT | Refills: 0 | Status: SHIPPED | OUTPATIENT
Start: 2024-09-16 | End: 2024-09-22

## 2024-09-16 RX ORDER — AZITHROMYCIN 250 MG/1
TABLET, FILM COATED ORAL
Qty: 6 TABLET | Refills: 0 | Status: SHIPPED | OUTPATIENT
Start: 2024-09-16 | End: 2024-09-26

## 2024-09-16 ASSESSMENT — ENCOUNTER SYMPTOMS
SINUS PRESSURE: 1
VOICE CHANGE: 0
WHEEZING: 0
SORE THROAT: 0
COUGH: 1
SHORTNESS OF BREATH: 0
TROUBLE SWALLOWING: 0
SINUS COMPLAINT: 1
SWOLLEN GLANDS: 0
HOARSE VOICE: 0

## 2024-11-06 ENCOUNTER — TELEMEDICINE (OUTPATIENT)
Dept: FAMILY MEDICINE CLINIC | Age: 59
End: 2024-11-06
Payer: COMMERCIAL

## 2024-11-06 DIAGNOSIS — R05.1 ACUTE COUGH: ICD-10-CM

## 2024-11-06 DIAGNOSIS — R51.9 SINUS HEADACHE: Primary | ICD-10-CM

## 2024-11-06 PROCEDURE — 99213 OFFICE O/P EST LOW 20 MIN: CPT | Performed by: FAMILY MEDICINE

## 2024-11-06 ASSESSMENT — ENCOUNTER SYMPTOMS
COUGH: 1
SINUS PRESSURE: 1
SORE THROAT: 0
WHEEZING: 0
SINUS COMPLAINT: 1
HOARSE VOICE: 0
RHINORRHEA: 0
SHORTNESS OF BREATH: 0
SWOLLEN GLANDS: 0
HEMOPTYSIS: 0

## 2024-11-06 NOTE — PROGRESS NOTES
status is at baseline.   Psychiatric:         Mood and Affect: Mood normal.         Behavior: Behavior normal.                  --Lora Vincent MD

## 2025-01-17 ENCOUNTER — OFFICE VISIT (OUTPATIENT)
Dept: FAMILY MEDICINE CLINIC | Age: 60
End: 2025-01-17
Payer: COMMERCIAL

## 2025-01-17 VITALS
WEIGHT: 202.5 LBS | SYSTOLIC BLOOD PRESSURE: 127 MMHG | HEIGHT: 59 IN | DIASTOLIC BLOOD PRESSURE: 84 MMHG | TEMPERATURE: 97.7 F | HEART RATE: 87 BPM | OXYGEN SATURATION: 99 % | BODY MASS INDEX: 40.82 KG/M2

## 2025-01-17 DIAGNOSIS — J01.90 ACUTE BACTERIAL SINUSITIS: Primary | ICD-10-CM

## 2025-01-17 DIAGNOSIS — B96.89 ACUTE BACTERIAL SINUSITIS: Primary | ICD-10-CM

## 2025-01-17 PROCEDURE — 99213 OFFICE O/P EST LOW 20 MIN: CPT | Performed by: FAMILY MEDICINE

## 2025-01-17 RX ORDER — AZITHROMYCIN 250 MG/1
TABLET, FILM COATED ORAL
Qty: 6 TABLET | Refills: 0 | Status: SHIPPED | OUTPATIENT
Start: 2025-01-17 | End: 2025-01-27

## 2025-01-17 RX ORDER — FLUTICASONE PROPIONATE 50 MCG
2 SPRAY, SUSPENSION (ML) NASAL DAILY
Qty: 1 EACH | Refills: 3 | Status: SHIPPED | OUTPATIENT
Start: 2025-01-17 | End: 2026-01-17

## 2025-01-17 SDOH — ECONOMIC STABILITY: FOOD INSECURITY: WITHIN THE PAST 12 MONTHS, THE FOOD YOU BOUGHT JUST DIDN'T LAST AND YOU DIDN'T HAVE MONEY TO GET MORE.: NEVER TRUE

## 2025-01-17 SDOH — ECONOMIC STABILITY: FOOD INSECURITY: WITHIN THE PAST 12 MONTHS, YOU WORRIED THAT YOUR FOOD WOULD RUN OUT BEFORE YOU GOT MONEY TO BUY MORE.: NEVER TRUE

## 2025-01-17 ASSESSMENT — ENCOUNTER SYMPTOMS
SINUS PRESSURE: 1
ABDOMINAL PAIN: 0
SHORTNESS OF BREATH: 0
COUGH: 1
RHINORRHEA: 1
EYE ITCHING: 0
SORE THROAT: 1
EYES NEGATIVE: 1
WHEEZING: 0
EYE PAIN: 0

## 2025-01-17 ASSESSMENT — PATIENT HEALTH QUESTIONNAIRE - PHQ9
SUM OF ALL RESPONSES TO PHQ QUESTIONS 1-9: 0
SUM OF ALL RESPONSES TO PHQ QUESTIONS 1-9: 0
2. FEELING DOWN, DEPRESSED OR HOPELESS: NOT AT ALL
SUM OF ALL RESPONSES TO PHQ9 QUESTIONS 1 & 2: 0
SUM OF ALL RESPONSES TO PHQ QUESTIONS 1-9: 0
SUM OF ALL RESPONSES TO PHQ QUESTIONS 1-9: 0
1. LITTLE INTEREST OR PLEASURE IN DOING THINGS: NOT AT ALL

## 2025-01-17 NOTE — PROGRESS NOTES
Clari Hernandez (:  1965) is a 59 y.o. female,Established patient, here for evaluation of the following chief complaint(s):  Cough, Sinus Problem, and Abdominal Pain          Subjective   SUBJECTIVE/OBJECTIVE:  HPI  59-year-old female patient here for acute visit.  Patient complains of sore throat, sinus pressure, headaches and productive cough.  She denies fever.    Review of Systems   Constitutional: Negative.  Negative for fatigue.   HENT:  Positive for rhinorrhea, sinus pressure and sore throat. Negative for congestion and ear pain.    Eyes: Negative.  Negative for pain and itching.   Respiratory:  Positive for cough. Negative for shortness of breath and wheezing.    Cardiovascular:  Negative for chest pain and palpitations.   Gastrointestinal:  Negative for abdominal pain.   Genitourinary:  Negative for frequency and urgency.   Musculoskeletal:  Negative for gait problem.   Skin:  Negative for rash.   Neurological:  Negative for dizziness and headaches.   Psychiatric/Behavioral:  The patient is not nervous/anxious.           Objective   Physical Exam  Constitutional:       Appearance: Normal appearance.   HENT:      Head: Normocephalic and atraumatic.      Right Ear: Tympanic membrane, ear canal and external ear normal.      Left Ear: Tympanic membrane, ear canal and external ear normal.      Nose: Nose normal. No congestion or rhinorrhea.      Mouth/Throat:      Mouth: Mucous membranes are moist.      Pharynx: Oropharynx is clear. No oropharyngeal exudate or posterior oropharyngeal erythema.   Eyes:      Extraocular Movements: Extraocular movements intact.      Conjunctiva/sclera: Conjunctivae normal.      Pupils: Pupils are equal, round, and reactive to light.   Neck:      Vascular: No carotid bruit.   Cardiovascular:      Rate and Rhythm: Normal rate and regular rhythm.      Pulses: Normal pulses.      Heart sounds: Normal heart sounds, S1 normal and S2 normal. No murmur heard.  Pulmonary:

## 2025-02-24 ENCOUNTER — HOSPITAL ENCOUNTER (EMERGENCY)
Age: 60
Discharge: HOME | End: 2025-02-24
Payer: COMMERCIAL

## 2025-02-24 VITALS
HEART RATE: 94 BPM | DIASTOLIC BLOOD PRESSURE: 81 MMHG | TEMPERATURE: 98.8 F | RESPIRATION RATE: 18 BRPM | OXYGEN SATURATION: 95 % | SYSTOLIC BLOOD PRESSURE: 134 MMHG

## 2025-02-24 DIAGNOSIS — M25.551 RIGHT HIP PAIN: Primary | ICD-10-CM

## 2025-02-24 PROCEDURE — 99283 EMERGENCY DEPT VISIT LOW MDM: CPT

## 2025-02-24 PROCEDURE — 6370000000 HC RX 637 (ALT 250 FOR IP): Performed by: PHYSICIAN ASSISTANT

## 2025-02-24 RX ORDER — TIZANIDINE 2 MG/1
2 TABLET ORAL 3 TIMES DAILY PRN
Qty: 20 TABLET | Refills: 0 | Status: SHIPPED | OUTPATIENT
Start: 2025-02-24

## 2025-02-24 RX ORDER — METHOCARBAMOL 500 MG/1
750 TABLET, FILM COATED ORAL ONCE
Status: COMPLETED | OUTPATIENT
Start: 2025-02-24 | End: 2025-02-24

## 2025-02-24 RX ORDER — LIDOCAINE 50 MG/G
1 PATCH TOPICAL DAILY
Qty: 30 PATCH | Refills: 0 | Status: SHIPPED | OUTPATIENT
Start: 2025-02-24

## 2025-02-24 RX ORDER — LIDOCAINE 4 G/G
1 PATCH TOPICAL ONCE
Status: DISCONTINUED | OUTPATIENT
Start: 2025-02-24 | End: 2025-02-24 | Stop reason: HOSPADM

## 2025-02-24 RX ADMIN — METHOCARBAMOL 750 MG: 500 TABLET ORAL at 13:54

## 2025-02-24 ASSESSMENT — PAIN DESCRIPTION - DESCRIPTORS
DESCRIPTORS: SORE
DESCRIPTORS: SHARP;SHOOTING

## 2025-02-24 ASSESSMENT — PAIN DESCRIPTION - FREQUENCY: FREQUENCY: INTERMITTENT

## 2025-02-24 ASSESSMENT — PAIN DESCRIPTION - LOCATION
LOCATION: HIP
LOCATION: HIP;BUTTOCKS

## 2025-02-24 ASSESSMENT — ENCOUNTER SYMPTOMS: BACK PAIN: 1

## 2025-02-24 ASSESSMENT — PAIN DESCRIPTION - ORIENTATION
ORIENTATION: RIGHT
ORIENTATION: RIGHT

## 2025-02-24 ASSESSMENT — PAIN DESCRIPTION - PAIN TYPE: TYPE: ACUTE PAIN

## 2025-02-24 ASSESSMENT — PAIN - FUNCTIONAL ASSESSMENT: PAIN_FUNCTIONAL_ASSESSMENT: 0-10

## 2025-02-24 ASSESSMENT — PAIN SCALES - GENERAL
PAINLEVEL_OUTOF10: 8
PAINLEVEL_OUTOF10: 8

## 2025-02-24 NOTE — ED TRIAGE NOTES
Pt reports right buttocks and hip pain that is sharp in nature for the last week. Pt reports sometimes ibuprofen and tylenol helps but it has been going on for long enough that she feels she needs to be seen for the pain. Pt denies fall.

## 2025-02-24 NOTE — DISCHARGE INSTRUCTIONS
As we discussed you can take Tylenol and ibuprofen as needed for pain.  Avoid strenuous activity including heavy lifting, repetitive bending while you are having symptoms.  Take Zanaflex as needed.  Note that this medication can cause sedation and you should not operate heavy machinery including driving a vehicle while taking this medication.  Apply Lidoderm patches as needed as directed for pain.  Follow-up with your primary care provider for what is suggestive of SI joint related musculoskeletal pain as physical therapy is recommended if symptoms persist.  Return to the emergency department with intolerable pain, injuries, loss of bowel or bladder control, numbness or tingling in your groin, other concerning symptoms.

## 2025-02-24 NOTE — ED NOTES
Patient prepared for and ready to be discharged. Patient discharged at this time in no acute distress after verbalizing understanding of discharge instructions. Patient left after receiving After Visit Summary instructions.        Yennifer Land, RN  02/24/25 1711

## 2025-02-24 NOTE — ED PROVIDER NOTES
THE [facility]  EMERGENCY DEPARTMENT ENCOUNTER          PHYSICIAN ASSISTANT NOTE       Date of evaluation: 2/24/2025    Chief Complaint     Hip Pain (right)      History of Present Illness     Clari Hernandez is a 60 y.o. 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  She started having pain in her left hip and the left side of her buttocks 6 days ago without any injury.  She reports just waking up and feeling this pain which has persisted.  She reports that it is worse when she sits for long periods and then stands up.  She denies any numbness or tingling, pain running down her leg or elsewhere in her body.  She denies any falls.  She denies prior similar symptoms.  She denies loss of bowel or bladder control, numbness or tingling in her groin.  She denies urinary symptoms.  She has been taking over-the-counter pain medication with only mild relief.  She denies fevers or chills and has been able to ambulate with stability.    Review of Systems     Review of Systems   Genitourinary:  Negative for dysuria and flank pain.   Musculoskeletal:  Positive for back pain. Negative for neck pain.   Skin:  Negative for wound.   Neurological:  Negative for weakness and numbness.       Past Medical, Surgical, Family, and Social History     She has a past medical history of Arthritis, Diabetes mellitus (HCC), PONV (postoperative nausea and vomiting), and Sinus congestion.  She has a past surgical history that includes Breast reduction surgery; ERCP (01/2015); Uvulectomy; Knee arthroscopy (Left, 5/26/2021); and Knee Arthrocentesis (Left, 11/22/2021).  Her family history includes Heart Attack in her father; Kidney Disease in her mother.  She reports that she has never smoked. She has never used smokeless tobacco. She reports that she does not drink alcohol and does not use

## 2025-03-20 ENCOUNTER — OFFICE VISIT (OUTPATIENT)
Dept: FAMILY MEDICINE CLINIC | Age: 60
End: 2025-03-20
Payer: COMMERCIAL

## 2025-03-20 VITALS
HEIGHT: 59 IN | HEART RATE: 88 BPM | WEIGHT: 203.1 LBS | TEMPERATURE: 97.5 F | BODY MASS INDEX: 40.94 KG/M2 | DIASTOLIC BLOOD PRESSURE: 72 MMHG | SYSTOLIC BLOOD PRESSURE: 124 MMHG | OXYGEN SATURATION: 99 %

## 2025-03-20 DIAGNOSIS — S29.011A CHEST WALL MUSCLE STRAIN, INITIAL ENCOUNTER: Primary | ICD-10-CM

## 2025-03-20 PROCEDURE — 99213 OFFICE O/P EST LOW 20 MIN: CPT | Performed by: FAMILY MEDICINE

## 2025-03-20 RX ORDER — NAPROXEN 500 MG/1
500 TABLET ORAL 2 TIMES DAILY WITH MEALS
Qty: 60 TABLET | Refills: 0 | Status: SHIPPED | OUTPATIENT
Start: 2025-03-20

## 2025-03-20 ASSESSMENT — ENCOUNTER SYMPTOMS
ABDOMINAL PAIN: 0
BOWEL INCONTINENCE: 0

## 2025-03-20 NOTE — PROGRESS NOTES
Clari Hernandez (:  1965) is a 60 y.o. female,Established patient, here for evaluation of the following chief complaint(s):  Follow-Up from Hospital         Assessment & Plan  Chest wall muscle strain, initial encounter   Benign exam   Possible muscle strain   Nsaid prn   Use lidoderm 5 % as recommended  Fu 2 weeks                  Subjective       Flank Pain  This is a new problem. Episode onset: 2 days bend over at work and head an snap sound. The problem is unchanged. Pain location: R flank. Quality: sharp pain. The pain is at a severity of 7/10. The pain is severe. The pain is Worse during the day. The symptoms are aggravated by bending, twisting and sitting. Stiffness is present All day. Pertinent negatives include no abdominal pain, bladder incontinence, bowel incontinence, chest pain, dysuria, fever, headaches, leg pain, numbness, paresis, paresthesias, pelvic pain, perianal numbness, tingling, weakness or weight loss. Risk factors include obesity. She has tried nothing for the symptoms.       Review of Systems   Constitutional:  Negative for fever and weight loss.   Cardiovascular:  Negative for chest pain.   Gastrointestinal:  Negative for abdominal pain and bowel incontinence.   Genitourinary:  Positive for flank pain. Negative for bladder incontinence, dysuria and pelvic pain.   Neurological:  Negative for tingling, weakness, numbness, headaches and paresthesias.          Objective   Blood pressure 124/72, pulse 88, temperature 97.5 °F (36.4 °C), height 1.499 m (4' 11\"), weight 92.1 kg (203 lb 1.6 oz), SpO2 99%, not currently breastfeeding.    Physical Exam  Vitals and nursing note reviewed.   Constitutional:       General: She is not in acute distress.     Appearance: Normal appearance. She is obese. She is not ill-appearing, toxic-appearing or diaphoretic.   HENT:      Head: Normocephalic and atraumatic.   Eyes:      Extraocular Movements: Extraocular movements intact.

## 2025-05-07 ENCOUNTER — TELEPHONE (OUTPATIENT)
Dept: FAMILY MEDICINE CLINIC | Age: 60
End: 2025-05-07

## 2025-05-07 NOTE — TELEPHONE ENCOUNTER
Called patient to schedule, call sent to voicemail left message patient to call office to get scheduled

## 2025-05-07 NOTE — TELEPHONE ENCOUNTER
----- Message from Nagi MCALLISTER sent at 5/5/2025  1:24 PM EDT -----  Regarding: ECC Escalation To Practice  ECC Escalation To Practice      Type of Escalation: Red Flag Symptom  --------------------------------------------------------------------------------------------------------------------------    Information for Provider:  Patient is looking for appointment for: Symptom Chest pain  Reasons for Message: Other The practice is not answering the call    Additional Information The patient is experiencing chest pain due to having a bad cough  --------------------------------------------------------------------------------------------------------------------------    Relationship to Patient: Self  Call Back Info: OK to leave message on voicemail  Preferred Call Back Number: Phone [phone]

## 2025-05-20 ENCOUNTER — OFFICE VISIT (OUTPATIENT)
Dept: FAMILY MEDICINE CLINIC | Age: 60
End: 2025-05-20
Payer: COMMERCIAL

## 2025-05-20 VITALS
HEART RATE: 87 BPM | WEIGHT: 200 LBS | OXYGEN SATURATION: 99 % | TEMPERATURE: 97.3 F | DIASTOLIC BLOOD PRESSURE: 69 MMHG | BODY MASS INDEX: 40.32 KG/M2 | SYSTOLIC BLOOD PRESSURE: 117 MMHG | HEIGHT: 59 IN

## 2025-05-20 DIAGNOSIS — R05.1 ACUTE COUGH: ICD-10-CM

## 2025-05-20 DIAGNOSIS — J34.9 SINUS PROBLEM: Primary | ICD-10-CM

## 2025-05-20 PROCEDURE — 99213 OFFICE O/P EST LOW 20 MIN: CPT | Performed by: FAMILY MEDICINE

## 2025-05-20 RX ORDER — CETIRIZINE HYDROCHLORIDE 10 MG/1
10 TABLET ORAL DAILY
Qty: 30 TABLET | Refills: 0 | Status: SHIPPED | OUTPATIENT
Start: 2025-05-20

## 2025-05-20 RX ORDER — METHYLPREDNISOLONE 4 MG/1
TABLET ORAL
Qty: 1 KIT | Refills: 0 | Status: SHIPPED | OUTPATIENT
Start: 2025-05-20 | End: 2025-05-26

## 2025-05-20 RX ORDER — FLUTICASONE PROPIONATE 50 MCG
2 SPRAY, SUSPENSION (ML) NASAL DAILY
Qty: 16 G | Refills: 0 | Status: SHIPPED | OUTPATIENT
Start: 2025-05-20

## 2025-05-20 NOTE — PROGRESS NOTES
Cardiovascular:      Rate and Rhythm: Normal rate and regular rhythm.      Pulses: Normal pulses.      Heart sounds: No murmur heard.     No friction rub. No gallop.   Pulmonary:      Effort: Pulmonary effort is normal. No respiratory distress.      Breath sounds: Normal breath sounds. No stridor. No wheezing, rhonchi or rales.   Chest:      Chest wall: No tenderness.   Musculoskeletal:         General: Normal range of motion.      Cervical back: Normal range of motion.   Lymphadenopathy:      Cervical: No cervical adenopathy.   Skin:     General: Skin is warm.      Capillary Refill: Capillary refill takes less than 2 seconds.      Findings: No erythema.   Neurological:      General: No focal deficit present.      Mental Status: She is alert and oriented to person, place, and time. Mental status is at baseline.      Cranial Nerves: No cranial nerve deficit.      Motor: No weakness.      Coordination: Coordination normal.      Gait: Gait normal.   Psychiatric:         Mood and Affect: Mood normal.         Behavior: Behavior normal.         Thought Content: Thought content normal.                  An electronic signature was used to authenticate this note.    --Lora Vincent MD

## 2025-05-21 ASSESSMENT — ENCOUNTER SYMPTOMS
EYE ITCHING: 1
SWOLLEN GLANDS: 0
SHORTNESS OF BREATH: 0
SORE THROAT: 0
COUGH: 1
HEMOPTYSIS: 0
SINUS PRESSURE: 0
WHEEZING: 0
HOARSE VOICE: 0
RHINORRHEA: 1
HEARTBURN: 0

## 2025-05-30 ENCOUNTER — OFFICE VISIT (OUTPATIENT)
Dept: FAMILY MEDICINE CLINIC | Age: 60
End: 2025-05-30
Payer: COMMERCIAL

## 2025-05-30 VITALS
HEART RATE: 101 BPM | TEMPERATURE: 98.2 F | SYSTOLIC BLOOD PRESSURE: 135 MMHG | BODY MASS INDEX: 39.72 KG/M2 | DIASTOLIC BLOOD PRESSURE: 79 MMHG | OXYGEN SATURATION: 98 % | HEIGHT: 59 IN | WEIGHT: 197 LBS

## 2025-05-30 DIAGNOSIS — E11.69 TYPE 2 DIABETES MELLITUS WITH OTHER SPECIFIED COMPLICATION, WITHOUT LONG-TERM CURRENT USE OF INSULIN (HCC): ICD-10-CM

## 2025-05-30 DIAGNOSIS — M54.6 ACUTE RIGHT-SIDED THORACIC BACK PAIN: ICD-10-CM

## 2025-05-30 DIAGNOSIS — R05.8 ALLERGIC COUGH: Primary | ICD-10-CM

## 2025-05-30 DIAGNOSIS — R07.89 CHEST WALL PAIN: ICD-10-CM

## 2025-05-30 PROCEDURE — 96372 THER/PROPH/DIAG INJ SC/IM: CPT | Performed by: FAMILY MEDICINE

## 2025-05-30 PROCEDURE — 99214 OFFICE O/P EST MOD 30 MIN: CPT | Performed by: FAMILY MEDICINE

## 2025-05-30 RX ORDER — LORATADINE 10 MG/1
10 TABLET ORAL DAILY
Qty: 30 TABLET | Refills: 3 | Status: SHIPPED | OUTPATIENT
Start: 2025-05-30

## 2025-05-30 RX ORDER — METHYLPREDNISOLONE ACETATE 40 MG/ML
40 INJECTION, SUSPENSION INTRA-ARTICULAR; INTRALESIONAL; INTRAMUSCULAR; SOFT TISSUE ONCE
Status: COMPLETED | OUTPATIENT
Start: 2025-05-30 | End: 2025-05-30

## 2025-05-30 RX ORDER — IBUPROFEN 600 MG/1
600 TABLET, FILM COATED ORAL EVERY 8 HOURS PRN
Qty: 90 TABLET | Refills: 0 | Status: SHIPPED | OUTPATIENT
Start: 2025-05-30 | End: 2025-06-29

## 2025-05-30 RX ORDER — BENZONATATE 200 MG/1
200 CAPSULE ORAL 3 TIMES DAILY PRN
Qty: 30 CAPSULE | Refills: 0 | Status: SHIPPED | OUTPATIENT
Start: 2025-05-30 | End: 2025-06-09

## 2025-05-30 RX ADMIN — METHYLPREDNISOLONE ACETATE 40 MG: 40 INJECTION, SUSPENSION INTRA-ARTICULAR; INTRALESIONAL; INTRAMUSCULAR; SOFT TISSUE at 13:48

## 2025-05-30 NOTE — PROGRESS NOTES
Clari Hernandez (:  1965) is a 60 y.o. female,Established patient, here for evaluation of the following chief complaint(s):  Follow-up and Back Pain (Rt x Monday )         Assessment & Plan  Allergic cough   No signs or sx of bacterial infection   Continue Flonase  Rx for Tessalon prn   Add claritin   Medrol IM today because of severe sx ,     Pt left office with vss, walking, no obvious sec effect form injection.       Orders:    benzonatate (TESSALON) 200 MG capsule; Take 1 capsule by mouth 3 times daily as needed for Cough    loratadine (CLARITIN) 10 MG tablet; Take 1 tablet by mouth daily    methylPREDNISolone acetate (DEPO-MEDROL) injection 40 mg    Chest wall pain   Trial with ibu prn for severe pain     Orders:    ibuprofen (ADVIL;MOTRIN) 600 MG tablet; Take 1 tablet by mouth every 8 hours as needed for Pain    Acute right-sided thoracic back pain    Muscle skeletal,   Nsaid prn          Type 2 diabetes mellitus with other specified complication, without long-term current use of insulin (HCC)    Chronic,  check blood work recommended   Fu 2 weeks      Orders:    Basic Metabolic Panel; Future    Hemoglobin A1C; Future    Albumin/Creatinine Ratio, Urine; Future    Lipid Panel; Future              Subjective   Cough  This is a recurrent problem. The current episode started 1 to 4 weeks ago. The problem has been waxing and waning. The problem occurs every few minutes. The cough is Non-productive. Associated symptoms include chest pain, headaches (frontal sinus ha), nasal congestion and postnasal drip. Pertinent negatives include no chills, ear congestion, ear pain, fever, heartburn, hemoptysis, myalgias, rash, rhinorrhea, sore throat, shortness of breath, sweats, weight loss or wheezing. The symptoms are aggravated by lying down. Treatments tried: flonase. The treatment provided mild relief. Her past medical history is significant for environmental allergies.   Back Pain  This is a new problem. The

## 2025-07-07 ENCOUNTER — APPOINTMENT (OUTPATIENT)
Dept: GENERAL RADIOLOGY | Age: 60
End: 2025-07-07
Payer: COMMERCIAL

## 2025-07-07 ENCOUNTER — HOSPITAL ENCOUNTER (EMERGENCY)
Age: 60
Discharge: HOME | End: 2025-07-07
Attending: EMERGENCY MEDICINE
Payer: COMMERCIAL

## 2025-07-07 VITALS
BODY MASS INDEX: 40.71 KG/M2 | WEIGHT: 201.94 LBS | DIASTOLIC BLOOD PRESSURE: 90 MMHG | OXYGEN SATURATION: 99 % | SYSTOLIC BLOOD PRESSURE: 151 MMHG | TEMPERATURE: 97.8 F | HEIGHT: 59 IN | RESPIRATION RATE: 12 BRPM | HEART RATE: 86 BPM

## 2025-07-07 DIAGNOSIS — M70.61 TROCHANTERIC BURSITIS, RIGHT HIP: Primary | ICD-10-CM

## 2025-07-07 PROCEDURE — 99283 EMERGENCY DEPT VISIT LOW MDM: CPT

## 2025-07-07 PROCEDURE — 6370000000 HC RX 637 (ALT 250 FOR IP): Performed by: EMERGENCY MEDICINE

## 2025-07-07 PROCEDURE — 73502 X-RAY EXAM HIP UNI 2-3 VIEWS: CPT

## 2025-07-07 PROCEDURE — 72100 X-RAY EXAM L-S SPINE 2/3 VWS: CPT

## 2025-07-07 RX ADMIN — IBUPROFEN 600 MG: 400 TABLET, FILM COATED ORAL at 17:21

## 2025-07-07 ASSESSMENT — PAIN DESCRIPTION - LOCATION
LOCATION: LEG
LOCATION: LEG

## 2025-07-07 ASSESSMENT — LIFESTYLE VARIABLES
HOW MANY STANDARD DRINKS CONTAINING ALCOHOL DO YOU HAVE ON A TYPICAL DAY: PATIENT DOES NOT DRINK
HOW OFTEN DO YOU HAVE A DRINK CONTAINING ALCOHOL: NEVER

## 2025-07-07 ASSESSMENT — PAIN - FUNCTIONAL ASSESSMENT
PAIN_FUNCTIONAL_ASSESSMENT: 0-10
PAIN_FUNCTIONAL_ASSESSMENT: 0-10

## 2025-07-07 ASSESSMENT — PAIN DESCRIPTION - DESCRIPTORS
DESCRIPTORS: DISCOMFORT
DESCRIPTORS: THROBBING

## 2025-07-07 ASSESSMENT — PAIN DESCRIPTION - ORIENTATION
ORIENTATION: RIGHT
ORIENTATION: RIGHT

## 2025-07-07 ASSESSMENT — PAIN SCALES - GENERAL
PAINLEVEL_OUTOF10: 7
PAINLEVEL_OUTOF10: 8

## 2025-07-07 NOTE — ED TRIAGE NOTES
[facility] Emergency Department  MEDICAL SCREENING EXAM    Date of Service: 7/7/2025    Reason for Visit: Leg Pain (Right leg pain for 2 weeks, been taking ibuprofen with minimal relief, denies any injuries )        Patient History, Brief Exam, and Initial Assessment     Abbreviated HPI: Clari Hernandez is a 60 y.o. female presenting with right leg pain, posterior. Ongoing x 2 weeks. Active but does not recall any specific injury. Has has some right sided back pain x 2 months. Ibuprofen does hellp. No weakness or numbness. Constipated, but no new bowel or bladder inconcinnate. No swelling.     INITIAL VITALS: BP: 131/80, Temp: 97.8 °F (36.6 °C), Pulse: 76, Respirations: 16, SpO2: 99 %    Plan     Patient was evaluated in the REU for a medical screening exam.     See primary provider's note for full details and final disposition.     Current Facility-Administered Medications:   No orders of the defined types were placed in this encounter.        REU Dispo     Stable for lobby while awaiting ED bed        Relevant Medical History     Past Medical History:   Diagnosis Date    Arthritis     Diabetes mellitus (HCC)     PONV (postoperative nausea and vomiting)     Sinus congestion      Past Surgical History:   Procedure Laterality Date    BREAST REDUCTION SURGERY      about 10 years ago    ERCP  01/2015    with stent     KNEE ARTHROCENTESIS Left 11/22/2021    LEFT KNEE GENICULAR BLOCK WITH PLATELET RICH PLASMA INJECTION SITE CONFIRMED BY FLUOROSCOPY performed by Karlos Dale MD at [facility] OR    KNEE ARTHROSCOPY Left 5/26/2021    LEFT KNEE ARTHROSCOPY WITH PATELLOFEMORAL CHONDROPLASTY, SUBCHONDROPLASTY PATELLA, 2 LOOSE BODY REMOVALS, RIGHT KNEE INJECTION performed by Karlos Dale MD at [facility] OR    UVULECTOMY       Allergies   Allergen Reactions    Dilaudid [Hydromorphone] Shortness Of Breath     Trouble breathing

## 2025-07-08 NOTE — ED PROVIDER NOTES
THE [facility]  EMERGENCY DEPARTMENT ENCOUNTER          ATTENDING PHYSICIAN NOTE       Date of evaluation: 7/7/2025    Chief Complaint     Leg Pain (Right leg pain for 2 weeks, been taking ibuprofen with minimal relief, denies any injuries )      History of Present Illness     Clari Hernandez is a 60 y.o. female who presents to the ED today with several weeks of some pain in the outer aspect of her R leg. Worse with walking, describes it as a pulling sensation in the outer upper thigh area down to the knee, alleviated with rest. SEE MSE note for additional details    Review of Systems     Review of Systems  All systems were reviewed with the patient/family and negative except as otherwise documented in the HPI.      Past Medical, Surgical, Family, and Social History     She has a past medical history of Arthritis, Diabetes mellitus (HCC), PONV (postoperative nausea and vomiting), and Sinus congestion.  She has a past surgical history that includes Breast reduction surgery; ERCP (01/2015); Uvulectomy; Knee arthroscopy (Left, 5/26/2021); and Knee Arthrocentesis (Left, 11/22/2021).  Her family history includes Heart Attack in her father; Kidney Disease in her mother.  She reports that she has never smoked. She has never used smokeless tobacco. She reports that she does not drink alcohol and does not use drugs.    Medications     Discharge Medication List as of 7/7/2025  8:33 PM        CONTINUE these medications which have NOT CHANGED    Details   loratadine (CLARITIN) 10 MG tablet Take 1 tablet by mouth daily, Disp-30 tablet, R-3Normal      ibuprofen (ADVIL;MOTRIN) 600 MG tablet Take 1 tablet by mouth every 8 hours as needed for Pain, Disp-90 tablet, R-0Normal      Fexofenadine HCl (MUCINEX ALLERGY PO) Take by mouthHistorical Med      !! fluticasone (FLONASE) 50 MCG/ACT nasal spray 2 sprays by Each Nostril route daily, Disp-16 g, R-0Normal      cetirizine (ZYRTEC) 10 MG tablet Take 1 tablet by mouth daily,  10 MG TABLET    Take 1 tablet by mouth daily    MULTIPLE VITAMINS-MINERALS (THERAPEUTIC MULTIVITAMIN-MINERALS) TABLET    Take 1 tablet by mouth daily    NAPROXEN (NAPROSYN) 500 MG TABLET    Take 1 tablet by mouth 2 times daily (with meals)    TIZANIDINE (ZANAFLEX) 2 MG TABLET    Take 1 tablet by mouth 3 times daily as needed (For back pain)       Allergies     She is allergic to dilaudid [hydromorphone].    Physical Exam     INITIAL VITALS: BP: 131/80, Temp: 97.8 °F (36.6 °C), Pulse: 76, Respirations: 16, SpO2: 99 %   Physical Exam  Constitutional:  ***   Eyes:  Sclera anicteric.  HEENT:  Normocephalic, oropharynx moist.   Neck: normal range of motion, supple.  Respiratory:  Even and non-labored, no distress   Cardiovascular:  Extremities warm, well perfused  GI:  Soft, nondistended   Musculoskeletal:  No edema, no deformities.   Skin:  No rash, no lesions, no pallor   Neurologic:  Awake and alert. Moving all extremities purposefully and with grossly normal coordination.    Psychiatric:  Normal mood.  Behavior appropriate.      Diagnostic Results     EKG   ***    RADIOLOGY:  XR LUMBAR SPINE (2-3 VIEWS)   Final Result      Lumbar spine   1.  No acute findings.   2.  Lower lumbar facet arthrosis and suspected chronic L5 pars defects.      Right hip   1.  No acute osseous abnormality.   2.  Mild right hip osteoarthritis.      Electronically signed by Paul Agosto MD      XR HIP 2-3 VW W PELVIS RIGHT   Final Result      Lumbar spine   1.  No acute findings.   2.  Lower lumbar facet arthrosis and suspected chronic L5 pars defects.      Right hip   1.  No acute osseous abnormality.   2.  Mild right hip osteoarthritis.      Electronically signed by Paul Agosto MD          LABS:   No results found for this visit on 07/07/25.    ED BEDSIDE ULTRASOUND:  ***    RECENT VITALS:  BP: (!) 151/90,Temp: 97.8 °F (36.6 °C), Pulse: 86, Respirations: 12, SpO2: 99 %     Procedures     ***    ED Course     Nursing Notes, Past Medical Hx,

## 2025-07-08 NOTE — ED NOTES
Pt discharged to home. Discharge instructions reviewed with patient. All questions answered, no concerns noted. Pt encouraged to return to emergency department if they have any new or worsening symptoms. Pt alert and oriented, resp even and unlabored, skin natural in color, no signs of acute distress.             Landy White, RN  07/07/25 [id number]

## 2025-07-15 ENCOUNTER — OFFICE VISIT (OUTPATIENT)
Dept: FAMILY MEDICINE CLINIC | Age: 60
End: 2025-07-15
Payer: COMMERCIAL

## 2025-07-15 VITALS
OXYGEN SATURATION: 99 % | HEIGHT: 59 IN | SYSTOLIC BLOOD PRESSURE: 130 MMHG | TEMPERATURE: 98 F | BODY MASS INDEX: 40.92 KG/M2 | DIASTOLIC BLOOD PRESSURE: 75 MMHG | WEIGHT: 203 LBS | HEART RATE: 75 BPM

## 2025-07-15 DIAGNOSIS — M54.50 CHRONIC BILATERAL LOW BACK PAIN WITHOUT SCIATICA: ICD-10-CM

## 2025-07-15 DIAGNOSIS — K59.01 SLOW TRANSIT CONSTIPATION: ICD-10-CM

## 2025-07-15 DIAGNOSIS — M70.61 TROCHANTERIC BURSITIS OF RIGHT HIP: Primary | ICD-10-CM

## 2025-07-15 DIAGNOSIS — G89.29 CHRONIC BILATERAL LOW BACK PAIN WITHOUT SCIATICA: ICD-10-CM

## 2025-07-15 PROCEDURE — 99214 OFFICE O/P EST MOD 30 MIN: CPT | Performed by: FAMILY MEDICINE

## 2025-07-15 RX ORDER — METHYLPREDNISOLONE 4 MG/1
TABLET ORAL
Qty: 1 KIT | Refills: 0 | Status: SHIPPED | OUTPATIENT
Start: 2025-07-15 | End: 2025-07-21

## 2025-07-15 RX ORDER — SENNA AND DOCUSATE SODIUM 50; 8.6 MG/1; MG/1
2 TABLET, FILM COATED ORAL DAILY
Qty: 90 TABLET | Refills: 0 | Status: SHIPPED | OUTPATIENT
Start: 2025-07-15

## 2025-07-15 RX ORDER — TRAMADOL HYDROCHLORIDE 50 MG/1
50 TABLET ORAL EVERY 6 HOURS PRN
Qty: 12 TABLET | Refills: 0 | Status: SHIPPED | OUTPATIENT
Start: 2025-07-15 | End: 2025-07-18

## 2025-07-15 ASSESSMENT — ENCOUNTER SYMPTOMS: BACK PAIN: 1

## 2025-07-15 NOTE — PROGRESS NOTES
Clari Hernandez (:  1965) is a 60 y.o. female,Established patient, here for evaluation of the following chief complaint(s):  Follow-Up from Hospital and Constipation         Assessment & Plan  Trochanteric bursitis of right hip   Pt Is traveling for vacation and her pain still severe  I advised her to continue to use ice, crutches as needed and rx for medrol joelle and tramadol for severe pain.   Sec effects were discussed including gi sx like nausea/constipation,   Fatigue, or even insomnia  patient agrees and verbalizes understanding      Orders:    methylPREDNISolone (MEDROL DOSEPACK) 4 MG tablet; Take by mouth.    traMADol (ULTRAM) 50 MG tablet; Take 1 tablet by mouth every 6 hours as needed for Pain for up to 3 days. Intended supply: 3 days. Take lowest dose possible to manage pain Max Daily Amount: 200 mg    Mili Flood MD, Orthopaedics and Sports Medicine (Hip; Knee; Shoulder), [facility]    Chronic bilateral low back pain without sciatica   Recurrent pain,   Medrol   Tramadol prn   Fu in 1 mo   We may need to get CT lumbar spine (see XR report reviewed and discussed with her today)     Orders:    methylPREDNISolone (MEDROL DOSEPACK) 4 MG tablet; Take by mouth.    traMADol (ULTRAM) 50 MG tablet; Take 1 tablet by mouth every 6 hours as needed for Pain for up to 3 days. Intended supply: 3 days. Take lowest dose possible to manage pain Max Daily Amount: 200 mg    Slow transit constipation   Take Colace prn as rx   encourage increase fiber/fluids in her diet   May worsen with Tramadol   patient agrees and verbalizes understanding      Orders:    sennosides-docusate sodium (SENOKOT-S) 8.6-50 MG tablet; Take 2 tablets by mouth daily      Fu 2 weeks         Subjective     Here for follow up from ED visit on    Reason for visit: R leg pain posterior , back pain Lower   Diagnosis given: trochanteric bursitis R   Treatment:   Work up: XR:  Mild R hip OA , lumbar XR: OA and L5 pars

## 2025-08-06 ENCOUNTER — PATIENT MESSAGE (OUTPATIENT)
Dept: FAMILY MEDICINE CLINIC | Age: 60
End: 2025-08-06

## 2025-08-11 ENCOUNTER — APPOINTMENT (OUTPATIENT)
Dept: GENERAL RADIOLOGY | Age: 60
End: 2025-08-11
Payer: COMMERCIAL

## 2025-08-11 ENCOUNTER — HOSPITAL ENCOUNTER (EMERGENCY)
Age: 60
Discharge: HOME | End: 2025-08-11
Attending: STUDENT IN AN ORGANIZED HEALTH CARE EDUCATION/TRAINING PROGRAM
Payer: COMMERCIAL

## 2025-08-11 VITALS
TEMPERATURE: 98.1 F | SYSTOLIC BLOOD PRESSURE: 137 MMHG | OXYGEN SATURATION: 92 % | HEART RATE: 74 BPM | DIASTOLIC BLOOD PRESSURE: 70 MMHG | BODY MASS INDEX: 41 KG/M2 | RESPIRATION RATE: 15 BRPM | HEIGHT: 59 IN

## 2025-08-11 DIAGNOSIS — M25.512 ACUTE PAIN OF BOTH SHOULDERS: ICD-10-CM

## 2025-08-11 DIAGNOSIS — R07.9 CHEST PAIN, UNSPECIFIED TYPE: ICD-10-CM

## 2025-08-11 DIAGNOSIS — M25.511 ACUTE PAIN OF BOTH SHOULDERS: ICD-10-CM

## 2025-08-11 DIAGNOSIS — R05.1 ACUTE COUGH: Primary | ICD-10-CM

## 2025-08-11 LAB
ANION GAP SERPL CALCULATED.3IONS-SCNC: 12 MMOL/L (ref 3–16)
BASOPHILS # BLD: 0 K/UL (ref 0–0.2)
BASOPHILS NFR BLD: 0.4 %
BUN SERPL-MCNC: 12 MG/DL (ref 7–20)
CALCIUM SERPL-MCNC: 9.2 MG/DL (ref 8.3–10.6)
CHLORIDE SERPL-SCNC: 97 MMOL/L (ref 99–110)
CO2 SERPL-SCNC: 25 MMOL/L (ref 21–32)
CREAT SERPL-MCNC: 0.5 MG/DL (ref 0.6–1.1)
DEPRECATED RDW RBC AUTO: 21.3 % (ref 12.4–15.4)
EKG ATRIAL RATE: 95 BPM
EKG DIAGNOSIS: NORMAL
EKG P AXIS: 47 DEGREES
EKG P-R INTERVAL: 160 MS
EKG Q-T INTERVAL: 328 MS
EKG QRS DURATION: 90 MS
EKG QTC CALCULATION (BAZETT): 412 MS
EKG R AXIS: -4 DEGREES
EKG T AXIS: 57 DEGREES
EKG VENTRICULAR RATE: 95 BPM
EOSINOPHIL # BLD: 0.1 K/UL (ref 0–0.6)
EOSINOPHIL NFR BLD: 2 %
FLUAV RNA RESP QL NAA+PROBE: NOT DETECTED
FLUBV RNA RESP QL NAA+PROBE: NOT DETECTED
GFR SERPLBLD CREATININE-BSD FMLA CKD-EPI: >90 ML/MIN/{1.73_M2}
GLUCOSE SERPL-MCNC: 397 MG/DL (ref 70–99)
HCT VFR BLD AUTO: 29.4 % (ref 36–48)
HGB BLD-MCNC: 9.8 G/DL (ref 12–16)
LYMPHOCYTES # BLD: 2 K/UL (ref 1–5.1)
LYMPHOCYTES NFR BLD: 32.8 %
MCH RBC QN AUTO: 26.2 PG (ref 26–34)
MCHC RBC AUTO-ENTMCNC: 33.4 G/DL (ref 31–36)
MCV RBC AUTO: 78.5 FL (ref 80–100)
MONOCYTES # BLD: 0.3 K/UL (ref 0–1.3)
MONOCYTES NFR BLD: 5.2 %
NEUTROPHILS # BLD: 3.6 K/UL (ref 1.7–7.7)
NEUTROPHILS NFR BLD: 59.6 %
NT-PROBNP SERPL-MCNC: <36 PG/ML (ref 0–124)
PLATELET # BLD AUTO: 245 K/UL (ref 135–450)
PMV BLD AUTO: 7.9 FL (ref 5–10.5)
POTASSIUM SERPL-SCNC: ABNORMAL MMOL/L (ref 3.5–5.1)
POTASSIUM SERPL-SCNC: NORMAL MMOL/L (ref 3.5–5.1)
POTASSIUM SERPL-SCNC: NORMAL MMOL/L (ref 3.5–5.1)
RBC # BLD AUTO: 3.74 M/UL (ref 4–5.2)
REASON FOR REJECTION: NORMAL
REJECTED TEST: NORMAL
SARS-COV-2 RNA RESP QL NAA+PROBE: NOT DETECTED
SODIUM SERPL-SCNC: 134 MMOL/L (ref 136–145)
TROPONIN, HIGH SENSITIVITY: <6 NG/L (ref 0–14)
TROPONIN, HIGH SENSITIVITY: <6 NG/L (ref 0–14)
WBC # BLD AUTO: 6.1 K/UL (ref 4–11)

## 2025-08-11 PROCEDURE — 80048 BASIC METABOLIC PNL TOTAL CA: CPT

## 2025-08-11 PROCEDURE — 6370000000 HC RX 637 (ALT 250 FOR IP)

## 2025-08-11 PROCEDURE — 87636 SARSCOV2 & INF A&B AMP PRB: CPT

## 2025-08-11 PROCEDURE — 93005 ELECTROCARDIOGRAM TRACING: CPT | Performed by: STUDENT IN AN ORGANIZED HEALTH CARE EDUCATION/TRAINING PROGRAM

## 2025-08-11 PROCEDURE — 83880 ASSAY OF NATRIURETIC PEPTIDE: CPT

## 2025-08-11 PROCEDURE — 84484 ASSAY OF TROPONIN QUANT: CPT

## 2025-08-11 PROCEDURE — 71046 X-RAY EXAM CHEST 2 VIEWS: CPT

## 2025-08-11 PROCEDURE — 85025 COMPLETE CBC W/AUTO DIFF WBC: CPT

## 2025-08-11 PROCEDURE — 99285 EMERGENCY DEPT VISIT HI MDM: CPT

## 2025-08-11 RX ORDER — LIDOCAINE 4 G/G
2 PATCH TOPICAL DAILY
Status: DISCONTINUED | OUTPATIENT
Start: 2025-08-11 | End: 2025-08-11

## 2025-08-11 RX ORDER — BENZONATATE 100 MG/1
100 CAPSULE ORAL 3 TIMES DAILY PRN
Qty: 30 CAPSULE | Refills: 0 | Status: SHIPPED | OUTPATIENT
Start: 2025-08-11 | End: 2025-08-21

## 2025-08-11 RX ORDER — LIDOCAINE 4 G/G
2 PATCH TOPICAL ONCE
Status: DISCONTINUED | OUTPATIENT
Start: 2025-08-11 | End: 2025-08-11 | Stop reason: HOSPADM

## 2025-08-11 RX ORDER — METHOCARBAMOL 500 MG/1
500 TABLET, FILM COATED ORAL 4 TIMES DAILY PRN
Qty: 20 TABLET | Refills: 0 | Status: SHIPPED | OUTPATIENT
Start: 2025-08-11 | End: 2025-08-16

## 2025-08-11 ASSESSMENT — PAIN DESCRIPTION - DESCRIPTORS: DESCRIPTORS: STABBING

## 2025-08-11 ASSESSMENT — PAIN DESCRIPTION - PAIN TYPE: TYPE: ACUTE PAIN;CHRONIC PAIN

## 2025-08-11 ASSESSMENT — LIFESTYLE VARIABLES
HOW OFTEN DO YOU HAVE A DRINK CONTAINING ALCOHOL: NEVER
HOW MANY STANDARD DRINKS CONTAINING ALCOHOL DO YOU HAVE ON A TYPICAL DAY: PATIENT DOES NOT DRINK

## 2025-08-11 ASSESSMENT — PAIN SCALES - GENERAL: PAINLEVEL_OUTOF10: 7

## 2025-08-11 ASSESSMENT — PAIN DESCRIPTION - ONSET: ONSET: ON-GOING

## 2025-08-11 ASSESSMENT — ENCOUNTER SYMPTOMS
SHORTNESS OF BREATH: 1
VOMITING: 0
DIARRHEA: 0
COUGH: 1
RHINORRHEA: 1
NAUSEA: 0

## 2025-08-11 ASSESSMENT — PAIN DESCRIPTION - LOCATION: LOCATION: CHEST

## 2025-08-11 ASSESSMENT — PAIN - FUNCTIONAL ASSESSMENT
PAIN_FUNCTIONAL_ASSESSMENT: ACTIVITIES ARE NOT PREVENTED
PAIN_FUNCTIONAL_ASSESSMENT: 0-10

## 2025-08-11 ASSESSMENT — PAIN DESCRIPTION - FREQUENCY: FREQUENCY: CONTINUOUS

## 2025-08-11 ASSESSMENT — PAIN DESCRIPTION - ORIENTATION: ORIENTATION: RIGHT

## 2025-08-14 ENCOUNTER — OFFICE VISIT (OUTPATIENT)
Dept: ORTHOPEDIC SURGERY | Age: 60
End: 2025-08-14
Payer: COMMERCIAL

## 2025-08-14 VITALS — BODY MASS INDEX: 40.92 KG/M2 | WEIGHT: 203 LBS | HEIGHT: 59 IN

## 2025-08-14 DIAGNOSIS — M51.26 HNP (HERNIATED NUCLEUS PULPOSUS), LUMBAR: Primary | ICD-10-CM

## 2025-08-14 DIAGNOSIS — M16.11 PRIMARY OSTEOARTHRITIS OF RIGHT HIP: ICD-10-CM

## 2025-08-14 DIAGNOSIS — M25.551 PAIN OF RIGHT HIP: ICD-10-CM

## 2025-08-14 PROCEDURE — 99213 OFFICE O/P EST LOW 20 MIN: CPT

## 2025-08-19 ENCOUNTER — OFFICE VISIT (OUTPATIENT)
Dept: ORTHOPEDIC SURGERY | Age: 60
End: 2025-08-19

## 2025-08-19 VITALS — BODY MASS INDEX: 40.92 KG/M2 | HEIGHT: 59 IN | WEIGHT: 203 LBS

## 2025-08-19 DIAGNOSIS — M16.11 PRIMARY OSTEOARTHRITIS OF RIGHT HIP: Primary | ICD-10-CM

## 2025-08-19 RX ORDER — ROPIVACAINE HYDROCHLORIDE 5 MG/ML
4 INJECTION, SOLUTION EPIDURAL; INFILTRATION; PERINEURAL ONCE
Status: COMPLETED | OUTPATIENT
Start: 2025-08-19 | End: 2025-08-19

## 2025-08-19 RX ORDER — METHYLPREDNISOLONE ACETATE 40 MG/ML
80 INJECTION, SUSPENSION INTRA-ARTICULAR; INTRALESIONAL; INTRAMUSCULAR; SOFT TISSUE ONCE
Status: COMPLETED | OUTPATIENT
Start: 2025-08-19 | End: 2025-08-19

## 2025-08-19 RX ORDER — LIDOCAINE HYDROCHLORIDE 10 MG/ML
10 INJECTION, SOLUTION INFILTRATION; PERINEURAL ONCE
Status: COMPLETED | OUTPATIENT
Start: 2025-08-19 | End: 2025-08-19

## 2025-08-19 RX ADMIN — ROPIVACAINE HYDROCHLORIDE 4 ML: 5 INJECTION, SOLUTION EPIDURAL; INFILTRATION; PERINEURAL at 14:56

## 2025-08-19 RX ADMIN — METHYLPREDNISOLONE ACETATE 80 MG: 40 INJECTION, SUSPENSION INTRA-ARTICULAR; INTRALESIONAL; INTRAMUSCULAR; SOFT TISSUE at 14:56

## 2025-08-19 RX ADMIN — LIDOCAINE HYDROCHLORIDE 10 ML: 10 INJECTION, SOLUTION INFILTRATION; PERINEURAL at 14:56

## (undated) DEVICE — SURE SET-DOUBLE BASIN-LF: Brand: MEDLINE INDUSTRIES, INC.

## (undated) DEVICE — COVER LT HNDL BLU PLAS

## (undated) DEVICE — SUTURE ETHLN SZ 3-0 L18IN NONABSORBABLE BLK FS-1 L24MM 3/8 663H

## (undated) DEVICE — SYRINGE 20ML LL S/C 50

## (undated) DEVICE — NEEDLE SPNL 25GA L3.5IN BLU HUB S STL REG WALL FIT STYL W/

## (undated) DEVICE — STANDARD HYPODERMIC NEEDLE,POLYPROPYLENE HUB: Brand: MONOJECT

## (undated) DEVICE — SHEET,DRAPE,53X77,STERILE: Brand: MEDLINE

## (undated) DEVICE — PROBE ABLAT XL 90DEG ASPIR BPLR RF 1 PC ELECTRD ERGO HNDL

## (undated) DEVICE — ADHESIVE SKIN CLSR 0.7ML TOP DERMBND ADV

## (undated) DEVICE — TUBING PMP L6FT CONT WAVE EXTN

## (undated) DEVICE — TUBING FLD MGMT Y DBL SPIK DUALWAVE

## (undated) DEVICE — KIT SEP W/ BLD DRAW TB SYR NDL TRNQT PD

## (undated) DEVICE — 3M™ STERI-DRAPE™ U-DRAPE 1015: Brand: STERI-DRAPE™

## (undated) DEVICE — Device

## (undated) DEVICE — Z INACTIVE NO SUPPLIER SOLUTIONIRRIG 3000ML 0.9% SOD CHL FLX CONT [79720808] [HOSPIRA WORLDWIDE INC]

## (undated) DEVICE — SUTURE ETHLN SZ 2-0 L18IN NONABSORBABLE BLK L19MM PS-2 PRIM 593H

## (undated) DEVICE — JEWISH HOSPITAL TURNOVER KIT: Brand: MEDLINE INDUSTRIES, INC.

## (undated) DEVICE — APPLICATOR MEDICATED 26 CC SOLUTION HI LT ORNG CHLORAPREP

## (undated) DEVICE — UNDERGLOVE SURG SZ 8 BLU LTX FREE SYN POLYISOPRENE POLYMER

## (undated) DEVICE — COUNTER NDL 40 COUNT HLD 70 NUM FOAM BLK SGL MAG W BLDE REMV

## (undated) DEVICE — 3M™ IOBAN™ 2 ANTIMICROBIAL INCISE DRAPE 6640EZ: Brand: IOBAN™ 2

## (undated) DEVICE — GOWN,SIRUS,POLYRNF,BRTHSLV,XL,30/CS: Brand: MEDLINE

## (undated) DEVICE — VELCLOSE LATEX FREE ELASTIC BANDAGE D/L 6INX10YD

## (undated) DEVICE — PACK PROCEDURE SURG ARTHROSCOPY

## (undated) DEVICE — 3M™ TEGADERM™ TRANSPARENT FILM DRESSING FRAME STYLE, 1626W, 4 IN X 4-3/4 IN (10 CM X 12 CM), 50/CT 4CT/CASE: Brand: 3M™ TEGADERM™

## (undated) DEVICE — SPLINT KNEE UNIV FOR LESS THAN 36IN L20IN FOAM LAM E CNTCT

## (undated) DEVICE — AVANOS* UNIVERSAL BLOCK TRAYS: Brand: AVANOS

## (undated) DEVICE — COVER,MAYO STAND,XL,STERILE: Brand: MEDLINE

## (undated) DEVICE — GLOVE SURG SZ 75 CRM LTX FREE POLYISOPRENE POLYMER BEAD ANTI

## (undated) DEVICE — IMMOBILIZER KNEE 22 IN DLX 3 PNL CNTOUR POST BLU CANVS

## (undated) DEVICE — INTENDED FOR TISSUE SEPARATION, AND OTHER PROCEDURES THAT REQUIRE A SHARP SURGICAL BLADE TO PUNCTURE OR CUT.: Brand: BARD-PARKER ® CARBON RIB-BACK BLADES

## (undated) DEVICE — TUBING PMP L16FT MAIN DISP FOR AR-6400 AR-6475

## (undated) DEVICE — ART BMC PLUS PROCESSING KIT: Brand: CELLING ART BMC SYSTEM

## (undated) DEVICE — SUTURE MCRYL SZ 4-0 L27IN ABSRB UD L19MM PS-2 1/2 CIR PRIM Y426H

## (undated) DEVICE — SYRINGE MED 10ML LUERLOCK TIP W/O SFTY DISP

## (undated) DEVICE — GLOVE ORTHO 8   MSG9480

## (undated) DEVICE — RAN-1115 BLUE F/G, STERILE, K2M P/N 74193-01M: Brand: RAN-1115

## (undated) DEVICE — 3M™ COBAN™ NL STERILE NON-LATEX SELF-ADHERENT WRAP, 2086S, 6 IN X 5 YD (15 CM X 4,5 M), 12 ROLLS/CASE: Brand: 3M™ COBAN™

## (undated) DEVICE — SMALL VOLUME BONE MARROW ASPIRATION KIT: Brand: SPINESMITH FUSIONARY GRAFT DELIVERY SYSTEM